# Patient Record
Sex: MALE | Race: WHITE | NOT HISPANIC OR LATINO | Employment: OTHER | URBAN - METROPOLITAN AREA
[De-identification: names, ages, dates, MRNs, and addresses within clinical notes are randomized per-mention and may not be internally consistent; named-entity substitution may affect disease eponyms.]

---

## 2017-01-26 ENCOUNTER — COMMUNICATION - HEALTHEAST (OUTPATIENT)
Dept: INTERNAL MEDICINE | Facility: CLINIC | Age: 77
End: 2017-01-26

## 2017-01-27 ENCOUNTER — AMBULATORY - HEALTHEAST (OUTPATIENT)
Dept: INTERNAL MEDICINE | Facility: CLINIC | Age: 77
End: 2017-01-27

## 2017-03-13 ENCOUNTER — OFFICE VISIT - HEALTHEAST (OUTPATIENT)
Dept: INTERNAL MEDICINE | Facility: CLINIC | Age: 77
End: 2017-03-13

## 2017-03-13 DIAGNOSIS — J18.9 PNEUMONIA: ICD-10-CM

## 2017-03-13 LAB
ATRIAL RATE - MUSE: 112 BPM
DIASTOLIC BLOOD PRESSURE - MUSE: NORMAL MMHG
INTERPRETATION ECG - MUSE: NORMAL
P AXIS - MUSE: 70 DEGREES
PR INTERVAL - MUSE: 162 MS
QRS DURATION - MUSE: 82 MS
QT - MUSE: 328 MS
QTC - MUSE: 447 MS
R AXIS - MUSE: 56 DEGREES
SYSTOLIC BLOOD PRESSURE - MUSE: NORMAL MMHG
T AXIS - MUSE: 49 DEGREES
VENTRICULAR RATE- MUSE: 112 BPM

## 2017-03-13 ASSESSMENT — MIFFLIN-ST. JEOR: SCORE: 1393.61

## 2017-03-28 ENCOUNTER — OFFICE VISIT - HEALTHEAST (OUTPATIENT)
Dept: INTERNAL MEDICINE | Facility: CLINIC | Age: 77
End: 2017-03-28

## 2017-03-28 DIAGNOSIS — E11.9 DIABETES MELLITUS, TYPE 2 (H): ICD-10-CM

## 2017-03-28 LAB
CHOLEST SERPL-MCNC: 154 MG/DL
FASTING STATUS PATIENT QL REPORTED: YES
HBA1C MFR BLD: 7.4 % (ref 3.5–6)
HDLC SERPL-MCNC: 50 MG/DL
LDLC SERPL CALC-MCNC: 75 MG/DL
TRIGL SERPL-MCNC: 147 MG/DL

## 2017-03-28 ASSESSMENT — MIFFLIN-ST. JEOR: SCORE: 1398.14

## 2017-03-29 ENCOUNTER — COMMUNICATION - HEALTHEAST (OUTPATIENT)
Dept: INTERNAL MEDICINE | Facility: CLINIC | Age: 77
End: 2017-03-29

## 2017-05-30 ENCOUNTER — COMMUNICATION - HEALTHEAST (OUTPATIENT)
Dept: SCHEDULING | Facility: CLINIC | Age: 77
End: 2017-05-30

## 2017-06-15 ENCOUNTER — COMMUNICATION - HEALTHEAST (OUTPATIENT)
Dept: INTERNAL MEDICINE | Facility: CLINIC | Age: 77
End: 2017-06-15

## 2017-06-29 ENCOUNTER — OFFICE VISIT - HEALTHEAST (OUTPATIENT)
Dept: INTERNAL MEDICINE | Facility: CLINIC | Age: 77
End: 2017-06-29

## 2017-06-29 DIAGNOSIS — E11.9 DIABETES MELLITUS, TYPE 2 (H): ICD-10-CM

## 2017-06-29 LAB
CHOLEST SERPL-MCNC: 165 MG/DL
FASTING STATUS PATIENT QL REPORTED: YES
HBA1C MFR BLD: 6.9 % (ref 3.5–6)
HDLC SERPL-MCNC: 49 MG/DL
LDLC SERPL CALC-MCNC: 93 MG/DL
TRIGL SERPL-MCNC: 115 MG/DL

## 2017-06-29 ASSESSMENT — MIFFLIN-ST. JEOR: SCORE: 1397.58

## 2017-07-06 ENCOUNTER — COMMUNICATION - HEALTHEAST (OUTPATIENT)
Dept: INTERNAL MEDICINE | Facility: CLINIC | Age: 77
End: 2017-07-06

## 2017-08-09 ENCOUNTER — AMBULATORY - HEALTHEAST (OUTPATIENT)
Dept: INTERNAL MEDICINE | Facility: CLINIC | Age: 77
End: 2017-08-09

## 2017-09-26 ENCOUNTER — COMMUNICATION - HEALTHEAST (OUTPATIENT)
Dept: SCHEDULING | Facility: CLINIC | Age: 77
End: 2017-09-26

## 2017-10-03 ENCOUNTER — COMMUNICATION - HEALTHEAST (OUTPATIENT)
Dept: INTERNAL MEDICINE | Facility: CLINIC | Age: 77
End: 2017-10-03

## 2017-10-03 ENCOUNTER — OFFICE VISIT - HEALTHEAST (OUTPATIENT)
Dept: INTERNAL MEDICINE | Facility: CLINIC | Age: 77
End: 2017-10-03

## 2017-10-03 DIAGNOSIS — E11.9 DIABETES MELLITUS, TYPE 2 (H): ICD-10-CM

## 2017-10-03 LAB
CHOLEST SERPL-MCNC: 151 MG/DL
FASTING STATUS PATIENT QL REPORTED: YES
HBA1C MFR BLD: 7 % (ref 3.5–6)
HDLC SERPL-MCNC: 49 MG/DL
LDLC SERPL CALC-MCNC: 78 MG/DL
TRIGL SERPL-MCNC: 121 MG/DL

## 2017-10-03 ASSESSMENT — MIFFLIN-ST. JEOR: SCORE: 1406.65

## 2017-10-30 ENCOUNTER — COMMUNICATION - HEALTHEAST (OUTPATIENT)
Dept: SCHEDULING | Facility: CLINIC | Age: 77
End: 2017-10-30

## 2017-10-30 ENCOUNTER — RECORDS - HEALTHEAST (OUTPATIENT)
Dept: ADMINISTRATIVE | Facility: OTHER | Age: 77
End: 2017-10-30

## 2017-12-13 ENCOUNTER — RECORDS - HEALTHEAST (OUTPATIENT)
Dept: ADMINISTRATIVE | Facility: OTHER | Age: 77
End: 2017-12-13

## 2017-12-28 ENCOUNTER — COMMUNICATION - HEALTHEAST (OUTPATIENT)
Dept: INTERNAL MEDICINE | Facility: CLINIC | Age: 77
End: 2017-12-28

## 2018-01-04 ENCOUNTER — OFFICE VISIT - HEALTHEAST (OUTPATIENT)
Dept: INTERNAL MEDICINE | Facility: CLINIC | Age: 78
End: 2018-01-04

## 2018-01-04 DIAGNOSIS — E11.9 DIABETES MELLITUS, TYPE 2 (H): ICD-10-CM

## 2018-01-04 DIAGNOSIS — R56.9 SEIZURE (H): ICD-10-CM

## 2018-01-04 LAB
CHOLEST SERPL-MCNC: 169 MG/DL
CREAT UR-MCNC: 155.4 MG/DL
FASTING STATUS PATIENT QL REPORTED: YES
FASTING STATUS PATIENT QL REPORTED: YES
GLUCOSE BLD-MCNC: 151 MG/DL (ref 70–125)
HBA1C MFR BLD: 7 % (ref 3.5–6)
HDLC SERPL-MCNC: 46 MG/DL
HIV 1+2 AB+HIV1 P24 AG SERPL QL IA: NEGATIVE
LDLC SERPL CALC-MCNC: 74 MG/DL
MICROALBUMIN UR-MCNC: 0.62 MG/DL (ref 0–1.99)
MICROALBUMIN/CREAT UR: 4 MG/G
TRIGL SERPL-MCNC: 246 MG/DL

## 2018-01-04 ASSESSMENT — MIFFLIN-ST. JEOR: SCORE: 1411.19

## 2018-01-05 ENCOUNTER — RECORDS - HEALTHEAST (OUTPATIENT)
Dept: ADMINISTRATIVE | Facility: OTHER | Age: 78
End: 2018-01-05

## 2018-01-05 ENCOUNTER — RECORDS - HEALTHEAST (OUTPATIENT)
Dept: LAB | Facility: HOSPITAL | Age: 78
End: 2018-01-05

## 2018-01-05 ENCOUNTER — COMMUNICATION - HEALTHEAST (OUTPATIENT)
Dept: INTERNAL MEDICINE | Facility: CLINIC | Age: 78
End: 2018-01-05

## 2018-01-05 LAB
TSH SERPL DL<=0.005 MIU/L-ACNC: 1.75 UIU/ML (ref 0.3–5)
VIT B12 SERPL-MCNC: 1197 PG/ML (ref 213–816)

## 2018-01-30 ENCOUNTER — RECORDS - HEALTHEAST (OUTPATIENT)
Dept: ADMINISTRATIVE | Facility: OTHER | Age: 78
End: 2018-01-30

## 2018-01-31 ENCOUNTER — COMMUNICATION - HEALTHEAST (OUTPATIENT)
Dept: INTERNAL MEDICINE | Facility: CLINIC | Age: 78
End: 2018-01-31

## 2018-02-22 ENCOUNTER — COMMUNICATION - HEALTHEAST (OUTPATIENT)
Dept: INTERNAL MEDICINE | Facility: CLINIC | Age: 78
End: 2018-02-22

## 2018-02-23 ENCOUNTER — COMMUNICATION - HEALTHEAST (OUTPATIENT)
Dept: INTERNAL MEDICINE | Facility: CLINIC | Age: 78
End: 2018-02-23

## 2018-02-23 ENCOUNTER — OFFICE VISIT - HEALTHEAST (OUTPATIENT)
Dept: INTERNAL MEDICINE | Facility: CLINIC | Age: 78
End: 2018-02-23

## 2018-02-23 DIAGNOSIS — Z00.00 ROUTINE GENERAL MEDICAL EXAMINATION AT A HEALTH CARE FACILITY: ICD-10-CM

## 2018-02-23 DIAGNOSIS — E11.9 TYPE 2 DIABETES MELLITUS (H): ICD-10-CM

## 2018-02-23 LAB
ALBUMIN SERPL-MCNC: 3.9 G/DL (ref 3.5–5)
ALBUMIN UR-MCNC: NEGATIVE MG/DL
ALP SERPL-CCNC: 71 U/L (ref 45–120)
ALT SERPL W P-5'-P-CCNC: 20 U/L (ref 0–45)
ANION GAP SERPL CALCULATED.3IONS-SCNC: 10 MMOL/L (ref 5–18)
APPEARANCE UR: CLEAR
AST SERPL W P-5'-P-CCNC: 21 U/L (ref 0–40)
BILIRUB SERPL-MCNC: 0.8 MG/DL (ref 0–1)
BILIRUB UR QL STRIP: NEGATIVE
BUN SERPL-MCNC: 18 MG/DL (ref 8–28)
CALCIUM SERPL-MCNC: 9.4 MG/DL (ref 8.5–10.5)
CHLORIDE BLD-SCNC: 103 MMOL/L (ref 98–107)
CHOLEST SERPL-MCNC: 170 MG/DL
CO2 SERPL-SCNC: 24 MMOL/L (ref 22–31)
COLOR UR AUTO: YELLOW
CREAT SERPL-MCNC: 1.35 MG/DL (ref 0.7–1.3)
CREAT UR-MCNC: 256.6 MG/DL
ERYTHROCYTE [DISTWIDTH] IN BLOOD BY AUTOMATED COUNT: 12.7 % (ref 11–14.5)
FASTING STATUS PATIENT QL REPORTED: NORMAL
GFR SERPL CREATININE-BSD FRML MDRD: 52 ML/MIN/1.73M2
GLUCOSE BLD-MCNC: 117 MG/DL (ref 70–125)
GLUCOSE UR STRIP-MCNC: NEGATIVE MG/DL
HBA1C MFR BLD: 7 % (ref 3.5–6)
HCT VFR BLD AUTO: 42.3 % (ref 40–54)
HDLC SERPL-MCNC: 55 MG/DL
HGB BLD-MCNC: 14.4 G/DL (ref 14–18)
HGB UR QL STRIP: NEGATIVE
KETONES UR STRIP-MCNC: NEGATIVE MG/DL
LDLC SERPL CALC-MCNC: 101 MG/DL
LEUKOCYTE ESTERASE UR QL STRIP: NEGATIVE
MCH RBC QN AUTO: 30.4 PG (ref 27–34)
MCHC RBC AUTO-ENTMCNC: 34 G/DL (ref 32–36)
MCV RBC AUTO: 90 FL (ref 80–100)
MICROALBUMIN UR-MCNC: 1.4 MG/DL (ref 0–1.99)
MICROALBUMIN/CREAT UR: 5.5 MG/G
NITRATE UR QL: NEGATIVE
PH UR STRIP: 5.5 [PH] (ref 5–8)
PLATELET # BLD AUTO: 133 THOU/UL (ref 140–440)
PMV BLD AUTO: 7.9 FL (ref 7–10)
POTASSIUM BLD-SCNC: 4.4 MMOL/L (ref 3.5–5)
PROT SERPL-MCNC: 7.5 G/DL (ref 6–8)
RBC # BLD AUTO: 4.72 MILL/UL (ref 4.4–6.2)
SODIUM SERPL-SCNC: 137 MMOL/L (ref 136–145)
SP GR UR STRIP: >=1.03 (ref 1–1.03)
TRIGL SERPL-MCNC: 72 MG/DL
TSH SERPL DL<=0.005 MIU/L-ACNC: 1.51 UIU/ML (ref 0.3–5)
UROBILINOGEN UR STRIP-ACNC: NORMAL
WBC: 6 THOU/UL (ref 4–11)

## 2018-02-23 ASSESSMENT — MIFFLIN-ST. JEOR: SCORE: 1406.65

## 2018-02-26 ENCOUNTER — COMMUNICATION - HEALTHEAST (OUTPATIENT)
Dept: INTERNAL MEDICINE | Facility: CLINIC | Age: 78
End: 2018-02-26

## 2018-02-28 ENCOUNTER — COMMUNICATION - HEALTHEAST (OUTPATIENT)
Dept: INTERNAL MEDICINE | Facility: CLINIC | Age: 78
End: 2018-02-28

## 2018-03-08 ENCOUNTER — COMMUNICATION - HEALTHEAST (OUTPATIENT)
Dept: INTERNAL MEDICINE | Facility: CLINIC | Age: 78
End: 2018-03-08

## 2018-03-08 ENCOUNTER — COMMUNICATION - HEALTHEAST (OUTPATIENT)
Dept: SCHEDULING | Facility: CLINIC | Age: 78
End: 2018-03-08

## 2018-03-13 ENCOUNTER — RECORDS - HEALTHEAST (OUTPATIENT)
Dept: ADMINISTRATIVE | Facility: OTHER | Age: 78
End: 2018-03-13

## 2018-03-15 ENCOUNTER — RECORDS - HEALTHEAST (OUTPATIENT)
Dept: ADMINISTRATIVE | Facility: OTHER | Age: 78
End: 2018-03-15

## 2018-03-27 ENCOUNTER — RECORDS - HEALTHEAST (OUTPATIENT)
Dept: ADMINISTRATIVE | Facility: OTHER | Age: 78
End: 2018-03-27

## 2018-04-02 ENCOUNTER — RECORDS - HEALTHEAST (OUTPATIENT)
Dept: ADMINISTRATIVE | Facility: OTHER | Age: 78
End: 2018-04-02

## 2018-05-25 ENCOUNTER — OFFICE VISIT - HEALTHEAST (OUTPATIENT)
Dept: INTERNAL MEDICINE | Facility: CLINIC | Age: 78
End: 2018-05-25

## 2018-05-25 DIAGNOSIS — E11.9 DIABETES MELLITUS, TYPE 2 (H): ICD-10-CM

## 2018-05-25 LAB
CHOLEST SERPL-MCNC: 156 MG/DL
FASTING STATUS PATIENT QL REPORTED: YES
FASTING STATUS PATIENT QL REPORTED: YES
GLUCOSE BLD-MCNC: 146 MG/DL (ref 70–125)
HBA1C MFR BLD: 7.4 % (ref 3.5–6)
HDLC SERPL-MCNC: 45 MG/DL
LDLC SERPL CALC-MCNC: 82 MG/DL
TRIGL SERPL-MCNC: 147 MG/DL

## 2018-05-25 ASSESSMENT — MIFFLIN-ST. JEOR: SCORE: 1402.11

## 2018-05-29 ENCOUNTER — COMMUNICATION - HEALTHEAST (OUTPATIENT)
Dept: INTERNAL MEDICINE | Facility: CLINIC | Age: 78
End: 2018-05-29

## 2018-06-04 ENCOUNTER — COMMUNICATION - HEALTHEAST (OUTPATIENT)
Dept: INTERNAL MEDICINE | Facility: CLINIC | Age: 78
End: 2018-06-04

## 2018-06-06 ENCOUNTER — COMMUNICATION - HEALTHEAST (OUTPATIENT)
Dept: SCHEDULING | Facility: CLINIC | Age: 78
End: 2018-06-06

## 2018-06-13 ENCOUNTER — RECORDS - HEALTHEAST (OUTPATIENT)
Dept: ADMINISTRATIVE | Facility: OTHER | Age: 78
End: 2018-06-13

## 2018-07-18 ENCOUNTER — COMMUNICATION - HEALTHEAST (OUTPATIENT)
Dept: INTERNAL MEDICINE | Facility: CLINIC | Age: 78
End: 2018-07-18

## 2018-07-26 ENCOUNTER — OFFICE VISIT - HEALTHEAST (OUTPATIENT)
Dept: INTERNAL MEDICINE | Facility: CLINIC | Age: 78
End: 2018-07-26

## 2018-07-26 DIAGNOSIS — E11.9 DIABETES MELLITUS, TYPE 2 (H): ICD-10-CM

## 2018-07-26 ASSESSMENT — MIFFLIN-ST. JEOR: SCORE: 1393.04

## 2018-08-09 ENCOUNTER — RECORDS - HEALTHEAST (OUTPATIENT)
Dept: ADMINISTRATIVE | Facility: OTHER | Age: 78
End: 2018-08-09

## 2018-08-30 ENCOUNTER — OFFICE VISIT - HEALTHEAST (OUTPATIENT)
Dept: INTERNAL MEDICINE | Facility: CLINIC | Age: 78
End: 2018-08-30

## 2018-08-30 DIAGNOSIS — E11.9 DIABETES MELLITUS, TYPE 2 (H): ICD-10-CM

## 2018-08-30 LAB
CHOLEST SERPL-MCNC: 156 MG/DL
FASTING STATUS PATIENT QL REPORTED: ABNORMAL
FASTING STATUS PATIENT QL REPORTED: ABNORMAL
GLUCOSE BLD-MCNC: 141 MG/DL (ref 70–125)
HBA1C MFR BLD: 7.1 % (ref 3.5–6)
HDLC SERPL-MCNC: 47 MG/DL
LDLC SERPL CALC-MCNC: 79 MG/DL
TRIGL SERPL-MCNC: 151 MG/DL

## 2018-08-30 ASSESSMENT — MIFFLIN-ST. JEOR: SCORE: 1383.97

## 2018-08-31 ENCOUNTER — COMMUNICATION - HEALTHEAST (OUTPATIENT)
Dept: INTERNAL MEDICINE | Facility: CLINIC | Age: 78
End: 2018-08-31

## 2018-09-02 ENCOUNTER — COMMUNICATION - HEALTHEAST (OUTPATIENT)
Dept: SCHEDULING | Facility: CLINIC | Age: 78
End: 2018-09-02

## 2018-09-10 ENCOUNTER — RECORDS - HEALTHEAST (OUTPATIENT)
Dept: ADMINISTRATIVE | Facility: OTHER | Age: 78
End: 2018-09-10

## 2018-09-24 ENCOUNTER — COMMUNICATION - HEALTHEAST (OUTPATIENT)
Dept: INTERNAL MEDICINE | Facility: CLINIC | Age: 78
End: 2018-09-24

## 2018-09-26 ENCOUNTER — RECORDS - HEALTHEAST (OUTPATIENT)
Dept: ADMINISTRATIVE | Facility: OTHER | Age: 78
End: 2018-09-26

## 2018-10-30 ENCOUNTER — OFFICE VISIT - HEALTHEAST (OUTPATIENT)
Dept: INTERNAL MEDICINE | Facility: CLINIC | Age: 78
End: 2018-10-30

## 2018-10-30 ENCOUNTER — COMMUNICATION - HEALTHEAST (OUTPATIENT)
Dept: INTERNAL MEDICINE | Facility: CLINIC | Age: 78
End: 2018-10-30

## 2018-10-30 DIAGNOSIS — Z72.52 HIGH RISK HOMOSEXUAL BEHAVIOR: ICD-10-CM

## 2018-10-30 DIAGNOSIS — Z23 NEED FOR IMMUNIZATION AGAINST INFLUENZA: ICD-10-CM

## 2018-10-30 LAB
HCV AB SERPL QL IA: NEGATIVE
HIV 1+2 AB+HIV1 P24 AG SERPL QL IA: NEGATIVE

## 2018-10-30 ASSESSMENT — MIFFLIN-ST. JEOR: SCORE: 1393.04

## 2018-11-23 ENCOUNTER — COMMUNICATION - HEALTHEAST (OUTPATIENT)
Dept: INTERNAL MEDICINE | Facility: CLINIC | Age: 78
End: 2018-11-23

## 2018-12-28 ENCOUNTER — COMMUNICATION - HEALTHEAST (OUTPATIENT)
Dept: INTERNAL MEDICINE | Facility: CLINIC | Age: 78
End: 2018-12-28

## 2018-12-28 DIAGNOSIS — E78.5 HLD (HYPERLIPIDEMIA): ICD-10-CM

## 2018-12-31 ENCOUNTER — OFFICE VISIT - HEALTHEAST (OUTPATIENT)
Dept: INTERNAL MEDICINE | Facility: CLINIC | Age: 78
End: 2018-12-31

## 2018-12-31 ENCOUNTER — COMMUNICATION - HEALTHEAST (OUTPATIENT)
Dept: INTERNAL MEDICINE | Facility: CLINIC | Age: 78
End: 2018-12-31

## 2018-12-31 DIAGNOSIS — Z01.818 PREOPERATIVE EXAMINATION: ICD-10-CM

## 2018-12-31 ASSESSMENT — MIFFLIN-ST. JEOR: SCORE: 1379.43

## 2019-01-02 ENCOUNTER — COMMUNICATION - HEALTHEAST (OUTPATIENT)
Dept: INTERNAL MEDICINE | Facility: CLINIC | Age: 79
End: 2019-01-02

## 2019-01-21 ENCOUNTER — COMMUNICATION - HEALTHEAST (OUTPATIENT)
Dept: SCHEDULING | Facility: CLINIC | Age: 79
End: 2019-01-21

## 2019-01-22 ENCOUNTER — ANCILLARY PROCEDURE (OUTPATIENT)
Dept: GENERAL RADIOLOGY | Facility: CLINIC | Age: 79
End: 2019-01-22
Payer: MEDICARE

## 2019-01-22 ENCOUNTER — OFFICE VISIT (OUTPATIENT)
Dept: URGENT CARE | Facility: URGENT CARE | Age: 79
End: 2019-01-22
Payer: MEDICARE

## 2019-01-22 VITALS
HEART RATE: 102 BPM | RESPIRATION RATE: 20 BRPM | DIASTOLIC BLOOD PRESSURE: 82 MMHG | TEMPERATURE: 97.5 F | OXYGEN SATURATION: 95 % | SYSTOLIC BLOOD PRESSURE: 150 MMHG | WEIGHT: 160 LBS

## 2019-01-22 DIAGNOSIS — R05.9 COUGH: ICD-10-CM

## 2019-01-22 DIAGNOSIS — J18.9 PNEUMONIA OF LEFT UPPER LOBE DUE TO INFECTIOUS ORGANISM: ICD-10-CM

## 2019-01-22 DIAGNOSIS — R05.9 COUGH: Primary | ICD-10-CM

## 2019-01-22 PROCEDURE — 71046 X-RAY EXAM CHEST 2 VIEWS: CPT

## 2019-01-22 PROCEDURE — 99203 OFFICE O/P NEW LOW 30 MIN: CPT | Performed by: FAMILY MEDICINE

## 2019-01-22 RX ORDER — PAROXETINE 20 MG/1
40 TABLET, FILM COATED ORAL EVERY MORNING
COMMUNITY

## 2019-01-22 RX ORDER — PAROXETINE 10 MG/1
10 TABLET, FILM COATED ORAL AT BEDTIME
COMMUNITY
End: 2023-07-03

## 2019-01-22 RX ORDER — AZITHROMYCIN 250 MG/1
TABLET, FILM COATED ORAL
Qty: 6 TABLET | Refills: 0 | Status: SHIPPED | OUTPATIENT
Start: 2019-01-22 | End: 2019-04-15

## 2019-01-22 RX ORDER — SIMVASTATIN 20 MG
20 TABLET ORAL AT BEDTIME
COMMUNITY
End: 2022-10-10

## 2019-01-22 RX ORDER — LORAZEPAM 0.5 MG/1
TABLET ORAL
COMMUNITY

## 2019-01-22 NOTE — PROGRESS NOTES
SUBJECTIVE:  Wilfrid Ortez is a 78 year old male who presents to the clinic today with a chief complaint of cough  for 4 week(s).  His cough is described as persistent and sometimes productive.    The patient's symptoms are moderate and worsening again after about a week of improvement.  Associated symptoms include fatigue, shortness of breath. The patient's symptoms are exacerbated by no particular triggers  Patient has been using Mucinex to improve symptoms.    ROS:  No known fevers.  No chest pain.  No ear pain or sore throat at present.  No GI symptoms.  No leg swelling.  No joint swelling.  No rashes.  No focal weakness, just general fatigue.  Shortness of breath is intermittent.  No immune issues.    PMH:  Panic disorder  Type II diabetes      Current Outpatient Medications   Medication Sig Dispense Refill     aspirin (ASA) 325 MG EC tablet Take 325 mg by mouth daily       azithromycin (ZITHROMAX) 250 MG tablet Take 2 tablets (500 mg) by mouth daily for 1 day, THEN 1 tablet (250 mg) daily for 4 days. 6 tablet 0     LORazepam (ATIVAN) 0.5 MG tablet Take 0.5 mg by mouth every 6 hours as needed for anxiety Patient takes 0.5 mg tab at nigjht       omeprazole (PRILOSEC) 20 MG DR capsule Take 20 mg by mouth daily       PARoxetine (PAXIL) 40 MG tablet Take 40 mg by mouth every morning Patient takes 2 20mg tab in am       simvastatin (ZOCOR) 20 MG tablet Take 20 mg by mouth At Bedtime       PARoxetine (PAXIL) 10 MG tablet Take 10 mg by mouth every morning Patient is suppose to take 10 tab at night but he cannot break it so he is not taking it         Social History     Tobacco Use     Smoking status: Never Smoker     Smokeless tobacco: Never Used   Substance Use Topics     Alcohol use: Not on file       OBJECTIVE:  /82 (Cuff Size: Adult Regular)   Pulse 102   Temp 97.5  F (36.4  C) (Oral)   Resp 20   Wt 72.6 kg (160 lb)   SpO2 95%   GENERAL APPEARANCE: healthy, alert and no distress  EYES:  anicteric sclerae, conjunctivae clear  HENT: ear canals and TM's normal.  Nose and mouth without ulcers, erythema or lesions  NECK: supple, nontender, no lymphadenopathy  RESP: few crackles RML, no wheezing, good air entry bilaterally  CV: regular rate and rhythm, normal S1 S2, no murmur noted  NEURO: fine resting tremor noted  SKIN: no suspicious lesions or rashes    CXR:  My reading of this film is that there is increased fullness around the L hilar area as well as a linear infiltrate in the lowerl portion of the KIMMY.  No cardiomegaly.  No effusions.  Radiology review pending.    ASSESSMENT:  KIMMY pneumonia  Type II diabetes, well-controlled    PLAN:  Patient Instructions   Take azithromycin for five days (2 tablets on day one, then 1 tablet every day for the next four).    Rest as your energy level dictates.    Follow up early next week with your regular doctor if not feeling back to usual self, or sooner if worsening.

## 2019-01-22 NOTE — PATIENT INSTRUCTIONS
Take azithromycin for five days (2 tablets on day one, then 1 tablet every day for the next four).    Rest as your energy level dictates.    Follow up early next week with your regular doctor if not feeling back to usual self, or sooner if worsening.

## 2019-02-05 ENCOUNTER — OFFICE VISIT (OUTPATIENT)
Dept: URGENT CARE | Facility: URGENT CARE | Age: 79
End: 2019-02-05
Payer: MEDICARE

## 2019-02-05 ENCOUNTER — COMMUNICATION - HEALTHEAST (OUTPATIENT)
Dept: SCHEDULING | Facility: CLINIC | Age: 79
End: 2019-02-05

## 2019-02-05 VITALS
SYSTOLIC BLOOD PRESSURE: 134 MMHG | WEIGHT: 162 LBS | HEART RATE: 81 BPM | TEMPERATURE: 98.3 F | OXYGEN SATURATION: 98 % | DIASTOLIC BLOOD PRESSURE: 78 MMHG | RESPIRATION RATE: 16 BRPM

## 2019-02-05 DIAGNOSIS — J98.01 ACUTE BRONCHOSPASM: Primary | ICD-10-CM

## 2019-02-05 PROCEDURE — 99214 OFFICE O/P EST MOD 30 MIN: CPT | Performed by: FAMILY MEDICINE

## 2019-02-05 RX ORDER — ALBUTEROL SULFATE 90 UG/1
2 AEROSOL, METERED RESPIRATORY (INHALATION) EVERY 4 HOURS PRN
Qty: 1 INHALER | Refills: 0 | Status: SHIPPED | OUTPATIENT
Start: 2019-02-05 | End: 2021-11-17

## 2019-02-06 NOTE — PROGRESS NOTES
SUBJECTIVE:  Wilfrid Ortez is a 78 year old male who presents to the clinic today with a chief complaint of wheezing intermittently since yesterday.   The patient's symptoms are mild and have resolved after a short time for each episode.  Associated symptoms include none. The patient's symptoms are exacerbated by no particular triggers  Patient has been using nothing  to improve symptoms.    No past medical history on file.   Recently treated with azithromycin for pneumonia.  Felt much better after antibiotics.        Social History     Tobacco Use     Smoking status: Never Smoker     Smokeless tobacco: Never Used   Substance Use Topics     Alcohol use: Not on file     Brother passed away last week.    ROS  No chest pain or palpitations.  No fever.  No shortness of breath between episodes of wheezing.    OBJECTIVE:  /78 (Cuff Size: Adult Regular)   Pulse 81   Temp 98.3  F (36.8  C) (Oral)   Resp 16   Wt 73.5 kg (162 lb)   SpO2 98%   GENERAL APPEARANCE: healthy, alert and no distress  EYES: anicteric sclerae, conjunctivae clear  HENT: ear canals and TM's normal.  Nose and mouth without ulcers, erythema or lesions  NECK: supple, nontender, no lymphadenopathy  RESP: lungs clear to auscultation - no rales, rhonchi or wheezes  CV: regular rate and rhythm, normal S1 S2, no murmur noted  SKIN: no suspicious lesions or rashes    ASSESSMENT:  (J98.01) Acute bronchospasm  (primary encounter diagnosis)  No bronchospasm at the present time.  No signs of CHF on exam tonight.  Lung exam significantly improved from my last visit with this patient, so I don't think there is residual pneumonia.    PLAN:  Albuterol q4h PRN for wheezing  Follow up with primary MD on Friday if still having episodes of wheezing.  Otherwise follow up for pre-op as scheduled next week.  Symptomatic measures encouraged, humidified air, plenty of fluids.

## 2019-02-06 NOTE — PATIENT INSTRUCTIONS
Albuterol inhaler:  Use this medicine to help open your airways when you notice that you are wheezing.    If not better by Friday, please call your primary doctor to check in.

## 2019-02-12 ENCOUNTER — OFFICE VISIT - HEALTHEAST (OUTPATIENT)
Dept: INTERNAL MEDICINE | Facility: CLINIC | Age: 79
End: 2019-02-12

## 2019-02-12 DIAGNOSIS — Z01.818 PREOPERATIVE EXAMINATION: ICD-10-CM

## 2019-02-12 LAB
HGB BLD-MCNC: 13.7 G/DL (ref 14–18)
POTASSIUM BLD-SCNC: 4 MMOL/L (ref 3.5–5)

## 2019-02-12 ASSESSMENT — MIFFLIN-ST. JEOR: SCORE: 1388.51

## 2019-02-13 ENCOUNTER — COMMUNICATION - HEALTHEAST (OUTPATIENT)
Dept: INTERNAL MEDICINE | Facility: CLINIC | Age: 79
End: 2019-02-13

## 2019-02-14 ENCOUNTER — COMMUNICATION - HEALTHEAST (OUTPATIENT)
Dept: INTERNAL MEDICINE | Facility: CLINIC | Age: 79
End: 2019-02-14

## 2019-02-17 ENCOUNTER — COMMUNICATION - HEALTHEAST (OUTPATIENT)
Dept: INTERNAL MEDICINE | Facility: CLINIC | Age: 79
End: 2019-02-17

## 2019-03-26 ENCOUNTER — RECORDS - HEALTHEAST (OUTPATIENT)
Dept: ADMINISTRATIVE | Facility: OTHER | Age: 79
End: 2019-03-26

## 2019-04-01 ENCOUNTER — COMMUNICATION - HEALTHEAST (OUTPATIENT)
Dept: INTERNAL MEDICINE | Facility: CLINIC | Age: 79
End: 2019-04-01

## 2019-04-01 ENCOUNTER — OFFICE VISIT - HEALTHEAST (OUTPATIENT)
Dept: INTERNAL MEDICINE | Facility: CLINIC | Age: 79
End: 2019-04-01

## 2019-04-01 DIAGNOSIS — Z12.5 SCREENING FOR PROSTATE CANCER: ICD-10-CM

## 2019-04-01 DIAGNOSIS — E11.8 TYPE 2 DIABETES MELLITUS WITH COMPLICATION, WITHOUT LONG-TERM CURRENT USE OF INSULIN (H): ICD-10-CM

## 2019-04-01 DIAGNOSIS — E78.5 HLD (HYPERLIPIDEMIA): ICD-10-CM

## 2019-04-01 LAB
CHOLEST SERPL-MCNC: 150 MG/DL
CREAT UR-MCNC: 235.3 MG/DL
FASTING STATUS PATIENT QL REPORTED: YES
FASTING STATUS PATIENT QL REPORTED: YES
GLUCOSE BLD-MCNC: 153 MG/DL (ref 70–125)
HBA1C MFR BLD: 6.6 % (ref 3.5–6)
HDLC SERPL-MCNC: 40 MG/DL
HGB BLD-MCNC: 14.7 G/DL (ref 14–18)
LDLC SERPL CALC-MCNC: 44 MG/DL
MICROALBUMIN UR-MCNC: 1.48 MG/DL (ref 0–1.99)
MICROALBUMIN/CREAT UR: 6.3 MG/G
PSA SERPL-MCNC: 3.3 NG/ML (ref 0–6.5)
TRIGL SERPL-MCNC: 330 MG/DL

## 2019-04-01 ASSESSMENT — MIFFLIN-ST. JEOR: SCORE: 1374.9

## 2019-04-15 ENCOUNTER — OFFICE VISIT (OUTPATIENT)
Dept: URGENT CARE | Facility: URGENT CARE | Age: 79
End: 2019-04-15
Payer: MEDICARE

## 2019-04-15 ENCOUNTER — COMMUNICATION - HEALTHEAST (OUTPATIENT)
Dept: SCHEDULING | Facility: CLINIC | Age: 79
End: 2019-04-15

## 2019-04-15 VITALS
RESPIRATION RATE: 14 BRPM | BODY MASS INDEX: 26.03 KG/M2 | WEIGHT: 162 LBS | DIASTOLIC BLOOD PRESSURE: 76 MMHG | HEART RATE: 97 BPM | HEIGHT: 66 IN | SYSTOLIC BLOOD PRESSURE: 129 MMHG | TEMPERATURE: 98.3 F

## 2019-04-15 DIAGNOSIS — H81.11 BENIGN PAROXYSMAL POSITIONAL VERTIGO OF RIGHT EAR: Primary | ICD-10-CM

## 2019-04-15 PROCEDURE — 99214 OFFICE O/P EST MOD 30 MIN: CPT | Performed by: PHYSICIAN ASSISTANT

## 2019-04-15 PROCEDURE — 93000 ELECTROCARDIOGRAM COMPLETE: CPT | Performed by: PHYSICIAN ASSISTANT

## 2019-04-15 RX ORDER — MECLIZINE HYDROCHLORIDE 25 MG/1
25 TABLET ORAL 3 TIMES DAILY PRN
Qty: 30 TABLET | Refills: 0 | Status: SHIPPED | OUTPATIENT
Start: 2019-04-15 | End: 2021-11-17

## 2019-04-15 ASSESSMENT — MIFFLIN-ST. JEOR: SCORE: 1407.58

## 2019-04-15 NOTE — PATIENT INSTRUCTIONS
Patient Education     Benign Paroxysmal Positional Vertigo     Your health care provider may move your head in certain ways to treat your BPPV.     Benign paroxysmal positional vertigo (BPPV) is a problem with the inner ear. The inner ear contains the vestibular system. This system is what helps you keep your balance. BPPV causes a feeling of spinning. It is a common problem of the vestibular system.  Understanding the vestibular system  The vestibular system of the ear is made up of very tiny parts. They include the utricle, saccule, and semicircular canals. The utricle is a tiny organ that contains calcium crystals. In some people, the crystals can move into the semicircular canals. When this happens, the system no longer works as it should. This causes BPPV. Benign means it is not life threatening. Paroxysmal means it happens suddenly. Positional means that it happens when you move your head. Vertigo is a feeling of spinning.  What causes BPPV?  Causes include injury to your head or neck. Other problems with the vestibular system may cause BPPV. In many people, the cause of BPPV is not known.  Symptoms of BPPV  You many have repeated feelings of spinning (vertigo). The vertigo usually lasts less than 1 minute. Some movements, such as rolling over in bed, can bring on vertigo.  Diagnosing BPPV  Your primary healthcare provider may diagnose and treat your BPPV. Or you may see an ear, nose, and throat doctor (otolaryngologist). In some cases, you may see a nervous system doctor (neurologist).  The healthcare provider will ask about your symptoms and your medical history. He or she will examine you. You may have hearing and balance tests. As part of the exam, your healthcare provider may have you move your head and body in certain ways. If you have BPPV, the movements can bring on vertigo. Your provider will also look for abnormal movements of your eyes. You may have other tests to check your vestibular or nervous  systems.  Treatment for BPPV  Your healthcare provider may try to move the calcium crystals. This is done by having you move your head and neck in certain ways. This treatment is safe and often works well. You may also be told to do these movements at home. You may still have vertigo for a few weeks. Your healthcare provider will recheck your symptoms, usually in about a month. Special physical therapy may also be part of treatment. In rare cases, surgery may be needed for BPPV that does not go away.     When to call the healthcare provider  Call your healthcare provider right away if you have any of these:    Symptoms that do not go away with treatment    Symptoms that get worse    New symptoms   Date Last Reviewed: 5/1/2017 2000-2018 [employer]. [address] [place] [address], [place] [address]. All rights reserved. This information is not intended as a substitute for professional medical care. Always follow your healthcare professional's instructions.

## 2019-04-16 NOTE — PROGRESS NOTES
CHIEF COMPLAINT:   Chief Complaint   Patient presents with     Dizziness     one week.pt states does have panic attacks. got up late morning and felt dizzy. pt type 2 diabetic.        HPI: Wilfrid Ortez is a 78 year old male who presents to clinic today for evaluation of dizziness. Patient reports that for the past one week he has been experiencing intermittent episodes of dizziness. Dizziness sometimes feels like the room is spinning around him and other times like he is light headed. Has noted intermittent nausea. He has been under more stress in the last couple of weeks. He has taken ativan, which helps his symptoms.  Denies fever/chills, HA, CP, pleuritic chest pain, cough, congestion, abd pain, N/V/D, rash, or any other symptoms.      History reviewed. No pertinent past medical history.  History reviewed. No pertinent surgical history.  Social History     Tobacco Use     Smoking status: Never Smoker     Smokeless tobacco: Never Used   Substance Use Topics     Alcohol use: Not on file     Current Outpatient Medications   Medication     albuterol (PROAIR HFA/PROVENTIL HFA/VENTOLIN HFA) 108 (90 Base) MCG/ACT inhaler     aspirin (ASA) 325 MG EC tablet     LORazepam (ATIVAN) 0.5 MG tablet     meclizine (ANTIVERT) 25 MG tablet     omeprazole (PRILOSEC) 20 MG DR capsule     PARoxetine (PAXIL) 10 MG tablet     PARoxetine (PAXIL) 40 MG tablet     simvastatin (ZOCOR) 20 MG tablet     No current facility-administered medications for this visit.      Allergies   Allergen Reactions     Ibuprofen      nausea       10 point ROS of systems including Constitutional, Eyes, Respiratory, Cardiovascular, Gastroenterology, Genitourinary, Integumentary, Muscularskeletal, Psychiatric were all negative except for pertinent positives noted in my HPI.        Exam:  /76 (BP Location: Right arm, Patient Position: Sitting, Cuff Size: Adult Regular)   Pulse 97   Temp 98.3  F (36.8  C) (Oral)   Resp 14   Ht 1.676 m (5'  "6\")   Wt 73.5 kg (162 lb)   BMI 26.15 kg/m    Constitutional: healthy, alert and no distress  Head: Normocephalic, atraumatic. Patient gets dizziness when moving his head to the right.   Eyes: conjunctiva clear, no drainage. Patient gets dizzy when moving his eyes to the right  ENT: TMs clear and shiny erica, nasal mucosa pink and moist, throat without tonsillar hypertrophy or erythema  Neck: neck is supple, no cervical lymphadenopathy or nuchal rigidity  Cardiovascular: RRR  Respiratory: CTA bilaterally, no rhonchi or rales  Gastrointestinal: soft and nontender  Skin: several patches of seborrheic dermatitis noted   Neurologic: CN 2-12,  Speech clear, gait normal. Moves all extremities. NO facial weakness. Coordination intact. Head and right arm tremor noted.      EKG- NSR    ASSESSMENT/PLAN:  1. Benign paroxysmal positional vertigo of right ear  DDx: CVA, MI/ACS, anxiety, Atrial fibrillation or other arrhythmia  Patient with new onset dizziness. Patient notes that the dizziness is worse in the AM, when he rolls over in bed.   Dizziness reproduced with moving his head to the right  No weakness, facial weakness, change in gait or neurological deficit  EKG is NSR  Meclizine Rx for BPPV, encouraged getting out of bed and turning head slowly. May need inner ear / vestibular PT if symptoms are not improving  RED FLAG symptoms discussed   - EKG 12-lead complete w/read - Clinics  - meclizine (ANTIVERT) 25 MG tablet; Take 1 tablet (25 mg) by mouth 3 times daily as needed for dizziness  Dispense: 30 tablet; Refill: 0    I have discussed the patient's diagnosis and my plan of treatment with the patient. We went over any labs or imaging. Patient is aware to come back in with worsening symptoms or if no relief despite treatment plan.  Patient verbalizes understanding. All questions were addressed and answered.   Kristine Gordon PA-C    "

## 2019-05-12 ENCOUNTER — RECORDS - HEALTHEAST (OUTPATIENT)
Dept: ADMINISTRATIVE | Facility: OTHER | Age: 79
End: 2019-05-12

## 2019-05-18 ENCOUNTER — COMMUNICATION - HEALTHEAST (OUTPATIENT)
Dept: INTERNAL MEDICINE | Facility: CLINIC | Age: 79
End: 2019-05-18

## 2019-05-18 DIAGNOSIS — Z00.00 ROUTINE GENERAL MEDICAL EXAMINATION AT A HEALTH CARE FACILITY: ICD-10-CM

## 2019-07-02 ENCOUNTER — OFFICE VISIT - HEALTHEAST (OUTPATIENT)
Dept: INTERNAL MEDICINE | Facility: CLINIC | Age: 79
End: 2019-07-02

## 2019-07-02 DIAGNOSIS — R20.2 PARESTHESIAS: ICD-10-CM

## 2019-07-02 DIAGNOSIS — E11.8 TYPE 2 DIABETES MELLITUS WITH COMPLICATION, WITHOUT LONG-TERM CURRENT USE OF INSULIN (H): ICD-10-CM

## 2019-07-02 LAB
CHOLEST SERPL-MCNC: 147 MG/DL
CREAT UR-MCNC: 215.4 MG/DL
FASTING STATUS PATIENT QL REPORTED: YES
FASTING STATUS PATIENT QL REPORTED: YES
GLUCOSE BLD-MCNC: 147 MG/DL (ref 70–125)
HBA1C MFR BLD: 6.9 % (ref 3.5–6)
HDLC SERPL-MCNC: 48 MG/DL
LDLC SERPL CALC-MCNC: 69 MG/DL
MICROALBUMIN UR-MCNC: 1.17 MG/DL (ref 0–1.99)
MICROALBUMIN/CREAT UR: 5.4 MG/G
TRIGL SERPL-MCNC: 149 MG/DL

## 2019-07-02 ASSESSMENT — MIFFLIN-ST. JEOR: SCORE: 1375.08

## 2019-07-03 ENCOUNTER — COMMUNICATION - HEALTHEAST (OUTPATIENT)
Dept: INTERNAL MEDICINE | Facility: CLINIC | Age: 79
End: 2019-07-03

## 2019-08-16 ENCOUNTER — COMMUNICATION - HEALTHEAST (OUTPATIENT)
Dept: INTERNAL MEDICINE | Facility: CLINIC | Age: 79
End: 2019-08-16

## 2019-08-16 DIAGNOSIS — Z00.00 ROUTINE GENERAL MEDICAL EXAMINATION AT A HEALTH CARE FACILITY: ICD-10-CM

## 2019-08-20 ENCOUNTER — RECORDS - HEALTHEAST (OUTPATIENT)
Dept: ADMINISTRATIVE | Facility: OTHER | Age: 79
End: 2019-08-20

## 2019-09-11 ENCOUNTER — RECORDS - HEALTHEAST (OUTPATIENT)
Dept: ADMINISTRATIVE | Facility: OTHER | Age: 79
End: 2019-09-11

## 2019-09-12 ENCOUNTER — RECORDS - HEALTHEAST (OUTPATIENT)
Dept: ADMINISTRATIVE | Facility: OTHER | Age: 79
End: 2019-09-12

## 2019-10-18 ENCOUNTER — HOSPITAL ENCOUNTER (OUTPATIENT)
Dept: LAB | Age: 79
Setting detail: SPECIMEN
Discharge: HOME | End: 2019-10-18

## 2019-10-18 ENCOUNTER — OFFICE VISIT - HEALTHEAST (OUTPATIENT)
Dept: INTERNAL MEDICINE | Facility: CLINIC | Age: 79
End: 2019-10-18

## 2019-10-18 DIAGNOSIS — E11.69 TYPE 2 DIABETES MELLITUS WITH OTHER SPECIFIED COMPLICATION, WITHOUT LONG-TERM CURRENT USE OF INSULIN (H): ICD-10-CM

## 2019-10-18 DIAGNOSIS — R56.9 SEIZURE (H): ICD-10-CM

## 2019-10-18 LAB
CHOLEST SERPL-MCNC: 157 MG/DL
CREAT UR-MCNC: 152.6 MG/DL
FASTING STATUS PATIENT QL REPORTED: YES
FASTING STATUS PATIENT QL REPORTED: YES
GLUCOSE BLD-MCNC: 132 MG/DL (ref 70–125)
HBA1C MFR BLD: 6.9 % (ref 3.5–6)
HDLC SERPL-MCNC: 56 MG/DL
LDLC SERPL CALC-MCNC: 82 MG/DL
MICROALBUMIN UR-MCNC: 0.86 MG/DL (ref 0–1.99)
MICROALBUMIN/CREAT UR: 5.6 MG/G
TRIGL SERPL-MCNC: 97 MG/DL

## 2019-10-18 ASSESSMENT — MIFFLIN-ST. JEOR: SCORE: 1374.9

## 2019-10-21 ENCOUNTER — COMMUNICATION - HEALTHEAST (OUTPATIENT)
Dept: INTERNAL MEDICINE | Facility: CLINIC | Age: 79
End: 2019-10-21

## 2019-11-07 ENCOUNTER — RECORDS - HEALTHEAST (OUTPATIENT)
Dept: ADMINISTRATIVE | Facility: OTHER | Age: 79
End: 2019-11-07

## 2019-11-07 LAB — RETINOPATHY: NEGATIVE

## 2019-11-11 ENCOUNTER — COMMUNICATION - HEALTHEAST (OUTPATIENT)
Dept: SCHEDULING | Facility: CLINIC | Age: 79
End: 2019-11-11

## 2019-11-12 ENCOUNTER — RECORDS - HEALTHEAST (OUTPATIENT)
Dept: SCHEDULING | Facility: CLINIC | Age: 79
End: 2019-11-12

## 2019-11-18 ENCOUNTER — RECORDS - HEALTHEAST (OUTPATIENT)
Dept: HEALTH INFORMATION MANAGEMENT | Facility: CLINIC | Age: 79
End: 2019-11-18

## 2019-11-19 ENCOUNTER — HOSPITAL ENCOUNTER (OUTPATIENT)
Dept: PHYSICAL MEDICINE AND REHAB | Facility: CLINIC | Age: 79
Discharge: HOME | End: 2019-11-19
Attending: STUDENT IN AN ORGANIZED HEALTH CARE EDUCATION/TRAINING PROGRAM

## 2019-11-19 DIAGNOSIS — M48.02 FORAMINAL STENOSIS OF CERVICAL REGION: ICD-10-CM

## 2019-11-19 DIAGNOSIS — M54.12 CERVICAL RADICULITIS: ICD-10-CM

## 2019-11-19 DIAGNOSIS — G56.02 CARPAL TUNNEL SYNDROME OF LEFT WRIST: ICD-10-CM

## 2019-11-19 ASSESSMENT — MIFFLIN-ST. JEOR: SCORE: 1393.49

## 2019-11-26 ENCOUNTER — OFFICE VISIT - HEALTHEAST (OUTPATIENT)
Dept: INTERNAL MEDICINE | Facility: CLINIC | Age: 79
End: 2019-11-26

## 2019-11-26 DIAGNOSIS — M54.12 CERVICAL RADICULOPATHY: ICD-10-CM

## 2019-11-26 DIAGNOSIS — R05.9 COUGH: ICD-10-CM

## 2019-11-26 ASSESSMENT — MIFFLIN-ST. JEOR: SCORE: 1393.04

## 2019-12-05 ENCOUNTER — OFFICE VISIT - HEALTHEAST (OUTPATIENT)
Dept: PHYSICAL THERAPY | Facility: REHABILITATION | Age: 79
End: 2019-12-05

## 2019-12-05 DIAGNOSIS — M54.12 CERVICAL RADICULITIS: ICD-10-CM

## 2019-12-05 DIAGNOSIS — M25.512 PAIN IN JOINT OF LEFT SHOULDER: ICD-10-CM

## 2019-12-05 DIAGNOSIS — R20.2 PARESTHESIAS IN LEFT HAND: ICD-10-CM

## 2019-12-05 DIAGNOSIS — M53.82 DECREASED ROM OF INTERVERTEBRAL DISCS OF CERVICAL SPINE: ICD-10-CM

## 2019-12-05 DIAGNOSIS — M25.522 ARTHRALGIA OF LEFT UPPER ARM: ICD-10-CM

## 2019-12-10 ENCOUNTER — COMMUNICATION - HEALTHEAST (OUTPATIENT)
Dept: PHYSICAL THERAPY | Facility: REHABILITATION | Age: 79
End: 2019-12-10

## 2019-12-13 ENCOUNTER — COMMUNICATION - HEALTHEAST (OUTPATIENT)
Dept: PHYSICAL THERAPY | Facility: REHABILITATION | Age: 79
End: 2019-12-13

## 2019-12-19 ENCOUNTER — RECORDS - HEALTHEAST (OUTPATIENT)
Dept: ADMINISTRATIVE | Facility: OTHER | Age: 79
End: 2019-12-19

## 2020-01-20 ENCOUNTER — OFFICE VISIT - HEALTHEAST (OUTPATIENT)
Dept: INTERNAL MEDICINE | Facility: CLINIC | Age: 80
End: 2020-01-20

## 2020-01-20 ENCOUNTER — COMMUNICATION - HEALTHEAST (OUTPATIENT)
Dept: INTERNAL MEDICINE | Facility: CLINIC | Age: 80
End: 2020-01-20

## 2020-01-20 ENCOUNTER — COMMUNICATION - HEALTHEAST (OUTPATIENT)
Dept: SCHEDULING | Facility: CLINIC | Age: 80
End: 2020-01-20

## 2020-01-20 DIAGNOSIS — E11.69 TYPE 2 DIABETES MELLITUS WITH OTHER SPECIFIED COMPLICATION, WITHOUT LONG-TERM CURRENT USE OF INSULIN (H): ICD-10-CM

## 2020-01-20 DIAGNOSIS — M54.12 CERVICAL RADICULOPATHY: ICD-10-CM

## 2020-01-20 LAB
CHOLEST SERPL-MCNC: 166 MG/DL
CREAT UR-MCNC: 114.1 MG/DL
FASTING STATUS PATIENT QL REPORTED: YES
FASTING STATUS PATIENT QL REPORTED: YES
GLUCOSE BLD-MCNC: 154 MG/DL (ref 70–125)
HBA1C MFR BLD: 6.7 % (ref 3.5–6)
HDLC SERPL-MCNC: 52 MG/DL
LDLC SERPL CALC-MCNC: 91 MG/DL
MICROALBUMIN UR-MCNC: 1.04 MG/DL (ref 0–1.99)
MICROALBUMIN/CREAT UR: 9.1 MG/G
TRIGL SERPL-MCNC: 115 MG/DL

## 2020-01-20 ASSESSMENT — MIFFLIN-ST. JEOR: SCORE: 1356.29

## 2020-01-28 ENCOUNTER — COMMUNICATION - HEALTHEAST (OUTPATIENT)
Dept: INTERNAL MEDICINE | Facility: CLINIC | Age: 80
End: 2020-01-28

## 2020-02-17 ENCOUNTER — COMMUNICATION - HEALTHEAST (OUTPATIENT)
Dept: INTERNAL MEDICINE | Facility: CLINIC | Age: 80
End: 2020-02-17

## 2020-02-17 DIAGNOSIS — Z00.00 ROUTINE GENERAL MEDICAL EXAMINATION AT A HEALTH CARE FACILITY: ICD-10-CM

## 2020-02-18 ENCOUNTER — COMMUNICATION - HEALTHEAST (OUTPATIENT)
Dept: SCHEDULING | Facility: CLINIC | Age: 80
End: 2020-02-18

## 2020-04-06 ENCOUNTER — COMMUNICATION - HEALTHEAST (OUTPATIENT)
Dept: INTERNAL MEDICINE | Facility: CLINIC | Age: 80
End: 2020-04-06

## 2020-04-06 DIAGNOSIS — E11.8 TYPE 2 DIABETES MELLITUS WITH COMPLICATION, WITHOUT LONG-TERM CURRENT USE OF INSULIN (H): ICD-10-CM

## 2020-04-06 DIAGNOSIS — E78.5 HLD (HYPERLIPIDEMIA): ICD-10-CM

## 2020-04-10 ENCOUNTER — COMMUNICATION - HEALTHEAST (OUTPATIENT)
Dept: INTERNAL MEDICINE | Facility: CLINIC | Age: 80
End: 2020-04-10

## 2020-04-10 DIAGNOSIS — E78.5 HLD (HYPERLIPIDEMIA): ICD-10-CM

## 2020-04-10 DIAGNOSIS — E11.8 TYPE 2 DIABETES MELLITUS WITH COMPLICATION, WITHOUT LONG-TERM CURRENT USE OF INSULIN (H): ICD-10-CM

## 2020-04-22 ENCOUNTER — COMMUNICATION - HEALTHEAST (OUTPATIENT)
Dept: INTERNAL MEDICINE | Facility: CLINIC | Age: 80
End: 2020-04-22

## 2020-04-22 DIAGNOSIS — M54.12 CERVICAL RADICULOPATHY: ICD-10-CM

## 2020-04-27 ENCOUNTER — COMMUNICATION - HEALTHEAST (OUTPATIENT)
Dept: INTERNAL MEDICINE | Facility: CLINIC | Age: 80
End: 2020-04-27

## 2020-06-09 ENCOUNTER — RECORDS - HEALTHEAST (OUTPATIENT)
Dept: ADMINISTRATIVE | Facility: OTHER | Age: 80
End: 2020-06-09

## 2020-07-15 ENCOUNTER — RECORDS - HEALTHEAST (OUTPATIENT)
Dept: ADMINISTRATIVE | Facility: OTHER | Age: 80
End: 2020-07-15

## 2020-07-16 ENCOUNTER — COMMUNICATION - HEALTHEAST (OUTPATIENT)
Dept: INTERNAL MEDICINE | Facility: CLINIC | Age: 80
End: 2020-07-16

## 2020-07-17 ENCOUNTER — COMMUNICATION - HEALTHEAST (OUTPATIENT)
Dept: INTERNAL MEDICINE | Facility: CLINIC | Age: 80
End: 2020-07-17

## 2020-07-17 DIAGNOSIS — E11.69 TYPE 2 DIABETES MELLITUS WITH OTHER SPECIFIED COMPLICATION, WITHOUT LONG-TERM CURRENT USE OF INSULIN (H): ICD-10-CM

## 2020-07-20 ENCOUNTER — RECORDS - HEALTHEAST (OUTPATIENT)
Dept: ADMINISTRATIVE | Facility: OTHER | Age: 80
End: 2020-07-20

## 2020-07-20 ENCOUNTER — OFFICE VISIT - HEALTHEAST (OUTPATIENT)
Dept: INTERNAL MEDICINE | Facility: CLINIC | Age: 80
End: 2020-07-20

## 2020-07-20 DIAGNOSIS — E11.8 TYPE 2 DIABETES MELLITUS WITH COMPLICATION, WITHOUT LONG-TERM CURRENT USE OF INSULIN (H): ICD-10-CM

## 2020-07-20 ASSESSMENT — PATIENT HEALTH QUESTIONNAIRE - PHQ9: SUM OF ALL RESPONSES TO PHQ QUESTIONS 1-9: 4

## 2020-07-21 ENCOUNTER — COMMUNICATION - HEALTHEAST (OUTPATIENT)
Dept: INTERNAL MEDICINE | Facility: CLINIC | Age: 80
End: 2020-07-21

## 2020-07-21 ENCOUNTER — AMBULATORY - HEALTHEAST (OUTPATIENT)
Dept: LAB | Facility: CLINIC | Age: 80
End: 2020-07-21

## 2020-07-21 DIAGNOSIS — E11.9 DIABETES MELLITUS WITHOUT COMPLICATION (H): ICD-10-CM

## 2020-07-21 DIAGNOSIS — E11.9 TYPE 2 DIABETES MELLITUS (H): ICD-10-CM

## 2020-07-21 DIAGNOSIS — E11.69 TYPE 2 DIABETES MELLITUS WITH OTHER SPECIFIED COMPLICATION, WITHOUT LONG-TERM CURRENT USE OF INSULIN (H): ICD-10-CM

## 2020-07-21 DIAGNOSIS — E11.8 TYPE 2 DIABETES MELLITUS WITH COMPLICATION, WITHOUT LONG-TERM CURRENT USE OF INSULIN (H): ICD-10-CM

## 2020-07-21 LAB
CHOLEST SERPL-MCNC: 182 MG/DL
CREAT UR-MCNC: 128.9 MG/DL
FASTING STATUS PATIENT QL REPORTED: YES
FASTING STATUS PATIENT QL REPORTED: YES
GLUCOSE BLD-MCNC: 189 MG/DL (ref 70–125)
HBA1C MFR BLD: 6.5 % (ref 3.5–6)
HDLC SERPL-MCNC: 51 MG/DL
LDLC SERPL CALC-MCNC: 113 MG/DL
MICROALBUMIN UR-MCNC: 1.06 MG/DL (ref 0–1.99)
MICROALBUMIN/CREAT UR: 8.2 MG/G
TRIGL SERPL-MCNC: 88 MG/DL

## 2020-07-23 ENCOUNTER — COMMUNICATION - HEALTHEAST (OUTPATIENT)
Dept: INTERNAL MEDICINE | Facility: CLINIC | Age: 80
End: 2020-07-23

## 2020-07-28 ENCOUNTER — RECORDS - HEALTHEAST (OUTPATIENT)
Dept: ADMINISTRATIVE | Facility: OTHER | Age: 80
End: 2020-07-28

## 2020-07-30 ENCOUNTER — RECORDS - HEALTHEAST (OUTPATIENT)
Dept: ADMINISTRATIVE | Facility: OTHER | Age: 80
End: 2020-07-30

## 2020-08-04 ENCOUNTER — RECORDS - HEALTHEAST (OUTPATIENT)
Dept: ADMINISTRATIVE | Facility: OTHER | Age: 80
End: 2020-08-04

## 2020-08-12 ENCOUNTER — RECORDS - HEALTHEAST (OUTPATIENT)
Dept: ADMINISTRATIVE | Facility: OTHER | Age: 80
End: 2020-08-12

## 2020-08-17 ENCOUNTER — OFFICE VISIT - HEALTHEAST (OUTPATIENT)
Dept: PHARMACY | Facility: CLINIC | Age: 80
End: 2020-08-17

## 2020-08-17 DIAGNOSIS — E11.9 TYPE 2 DIABETES MELLITUS WITHOUT COMPLICATION, WITHOUT LONG-TERM CURRENT USE OF INSULIN (H): ICD-10-CM

## 2020-08-17 DIAGNOSIS — M54.12 CERVICAL RADICULOPATHY: ICD-10-CM

## 2020-08-17 DIAGNOSIS — K44.9 DIAPHRAGMATIC HERNIA WITHOUT OBSTRUCTION AND WITHOUT GANGRENE: ICD-10-CM

## 2020-08-17 DIAGNOSIS — E78.5 HYPERLIPIDEMIA, UNSPECIFIED HYPERLIPIDEMIA TYPE: ICD-10-CM

## 2020-08-17 DIAGNOSIS — F43.22 ADJUSTMENT DISORDER WITH ANXIETY: ICD-10-CM

## 2020-08-17 PROCEDURE — 99605 MTMS BY PHARM NP 15 MIN: CPT | Performed by: PHARMACIST

## 2020-08-17 PROCEDURE — 99607 MTMS BY PHARM ADDL 15 MIN: CPT | Performed by: PHARMACIST

## 2020-09-23 DIAGNOSIS — G25.0 BENIGN ESSENTIAL TREMOR: Primary | ICD-10-CM

## 2020-09-23 RX ORDER — PROPRANOLOL HYDROCHLORIDE 10 MG/1
1 TABLET ORAL 3 TIMES DAILY PRN
COMMUNITY
Start: 2020-07-23 | End: 2020-09-23

## 2020-09-23 RX ORDER — PROPRANOLOL HYDROCHLORIDE 10 MG/1
10 TABLET ORAL 3 TIMES DAILY
Qty: 90 TABLET | Refills: 4 | Status: SHIPPED | OUTPATIENT
Start: 2020-09-23 | End: 2021-04-02

## 2020-09-29 ENCOUNTER — COMMUNICATION - HEALTHEAST (OUTPATIENT)
Dept: SCHEDULING | Facility: CLINIC | Age: 80
End: 2020-09-29

## 2020-09-29 ENCOUNTER — RECORDS - HEALTHEAST (OUTPATIENT)
Dept: ADMINISTRATIVE | Facility: OTHER | Age: 80
End: 2020-09-29

## 2020-10-29 ENCOUNTER — COMMUNICATION - HEALTHEAST (OUTPATIENT)
Dept: INTERNAL MEDICINE | Facility: CLINIC | Age: 80
End: 2020-10-29

## 2020-10-29 DIAGNOSIS — E88.810 METABOLIC SYNDROME: ICD-10-CM

## 2020-11-20 ENCOUNTER — OFFICE VISIT - HEALTHEAST (OUTPATIENT)
Dept: INTERNAL MEDICINE | Facility: CLINIC | Age: 80
End: 2020-11-20

## 2020-11-20 DIAGNOSIS — E11.8 TYPE 2 DIABETES MELLITUS WITH COMPLICATION, WITHOUT LONG-TERM CURRENT USE OF INSULIN (H): ICD-10-CM

## 2020-11-20 DIAGNOSIS — Z72.52 HIGH RISK HOMOSEXUAL BEHAVIOR: ICD-10-CM

## 2020-11-20 DIAGNOSIS — Z12.5 SCREENING FOR PROSTATE CANCER: ICD-10-CM

## 2020-11-20 LAB
CHOLEST SERPL-MCNC: 175 MG/DL
CREAT UR-MCNC: 164.3 MG/DL
FASTING STATUS PATIENT QL REPORTED: YES
FASTING STATUS PATIENT QL REPORTED: YES
GLUCOSE BLD-MCNC: 129 MG/DL (ref 70–125)
HBA1C MFR BLD: 6.6 %
HDLC SERPL-MCNC: 54 MG/DL
HIV 1+2 AB+HIV1 P24 AG SERPL QL IA: NEGATIVE
LDLC SERPL CALC-MCNC: 98 MG/DL
MICROALBUMIN UR-MCNC: 1.47 MG/DL (ref 0–1.99)
MICROALBUMIN/CREAT UR: 8.9 MG/G
PSA SERPL-MCNC: 3.5 NG/ML (ref 0–6.5)
TRIGL SERPL-MCNC: 114 MG/DL

## 2020-11-20 ASSESSMENT — MIFFLIN-ST. JEOR: SCORE: 1351.75

## 2020-11-23 ENCOUNTER — COMMUNICATION - HEALTHEAST (OUTPATIENT)
Dept: INTERNAL MEDICINE | Facility: CLINIC | Age: 80
End: 2020-11-23

## 2021-01-27 ENCOUNTER — RECORDS - HEALTHEAST (OUTPATIENT)
Dept: ADMINISTRATIVE | Facility: OTHER | Age: 81
End: 2021-01-27

## 2021-02-01 ENCOUNTER — COMMUNICATION - HEALTHEAST (OUTPATIENT)
Dept: SCHEDULING | Facility: CLINIC | Age: 81
End: 2021-02-01

## 2021-02-04 ENCOUNTER — RECORDS - HEALTHEAST (OUTPATIENT)
Dept: ADMINISTRATIVE | Facility: OTHER | Age: 81
End: 2021-02-04

## 2021-02-11 ENCOUNTER — COMMUNICATION - HEALTHEAST (OUTPATIENT)
Dept: ADMINISTRATIVE | Facility: CLINIC | Age: 81
End: 2021-02-11

## 2021-02-16 ENCOUNTER — OFFICE VISIT - HEALTHEAST (OUTPATIENT)
Dept: INTERNAL MEDICINE | Facility: CLINIC | Age: 81
End: 2021-02-16

## 2021-02-16 DIAGNOSIS — E11.8 TYPE 2 DIABETES MELLITUS WITH COMPLICATION, WITHOUT LONG-TERM CURRENT USE OF INSULIN (H): ICD-10-CM

## 2021-03-10 ENCOUNTER — RECORDS - HEALTHEAST (OUTPATIENT)
Dept: ADMINISTRATIVE | Facility: OTHER | Age: 81
End: 2021-03-10

## 2021-03-22 ENCOUNTER — OFFICE VISIT - HEALTHEAST (OUTPATIENT)
Dept: INTERNAL MEDICINE | Facility: CLINIC | Age: 81
End: 2021-03-22

## 2021-03-22 ENCOUNTER — COMMUNICATION - HEALTHEAST (OUTPATIENT)
Dept: INTERNAL MEDICINE | Facility: CLINIC | Age: 81
End: 2021-03-22

## 2021-03-22 DIAGNOSIS — E78.5 HLD (HYPERLIPIDEMIA): ICD-10-CM

## 2021-03-22 DIAGNOSIS — E11.8 TYPE 2 DIABETES MELLITUS WITH COMPLICATION, WITHOUT LONG-TERM CURRENT USE OF INSULIN (H): ICD-10-CM

## 2021-03-22 LAB
CHOLEST SERPL-MCNC: 150 MG/DL
FASTING STATUS PATIENT QL REPORTED: YES
FASTING STATUS PATIENT QL REPORTED: YES
GLUCOSE BLD-MCNC: 134 MG/DL (ref 70–125)
HBA1C MFR BLD: 6.9 %
HDLC SERPL-MCNC: 52 MG/DL
LDLC SERPL CALC-MCNC: 81 MG/DL
POTASSIUM BLD-SCNC: 4.4 MMOL/L (ref 3.5–5)
TRIGL SERPL-MCNC: 84 MG/DL

## 2021-03-22 ASSESSMENT — MIFFLIN-ST. JEOR: SCORE: 1351.75

## 2021-04-01 ENCOUNTER — COMMUNICATION - HEALTHEAST (OUTPATIENT)
Dept: SCHEDULING | Facility: CLINIC | Age: 81
End: 2021-04-01

## 2021-04-02 ENCOUNTER — OFFICE VISIT (OUTPATIENT)
Dept: NEUROLOGY | Facility: CLINIC | Age: 81
End: 2021-04-02
Payer: COMMERCIAL

## 2021-04-02 ENCOUNTER — RECORDS - HEALTHEAST (OUTPATIENT)
Dept: ADMINISTRATIVE | Facility: OTHER | Age: 81
End: 2021-04-02

## 2021-04-02 VITALS
DIASTOLIC BLOOD PRESSURE: 76 MMHG | BODY MASS INDEX: 25.07 KG/M2 | WEIGHT: 156 LBS | HEART RATE: 74 BPM | HEIGHT: 66 IN | SYSTOLIC BLOOD PRESSURE: 135 MMHG

## 2021-04-02 DIAGNOSIS — G25.0 BENIGN ESSENTIAL TREMOR: Primary | ICD-10-CM

## 2021-04-02 DIAGNOSIS — G45.4 TRANSIENT GLOBAL AMNESIA: ICD-10-CM

## 2021-04-02 PROCEDURE — 99204 OFFICE O/P NEW MOD 45 MIN: CPT | Performed by: PSYCHIATRY & NEUROLOGY

## 2021-04-02 RX ORDER — PROPRANOLOL HYDROCHLORIDE 10 MG/1
10 TABLET ORAL 2 TIMES DAILY
Qty: 180 TABLET | Refills: 3 | Status: SHIPPED | OUTPATIENT
Start: 2021-04-02 | End: 2022-04-18

## 2021-04-02 RX ORDER — LATANOPROST 50 UG/ML
1 SOLUTION/ DROPS OPHTHALMIC DAILY
COMMUNITY

## 2021-04-02 RX ORDER — GABAPENTIN 300 MG/1
CAPSULE ORAL
COMMUNITY
Start: 2021-01-27 | End: 2021-11-17

## 2021-04-02 RX ORDER — GABAPENTIN 600 MG/1
600 TABLET ORAL AT BEDTIME
COMMUNITY
Start: 2021-01-27 | End: 2021-11-17

## 2021-04-02 SDOH — HEALTH STABILITY: MENTAL HEALTH: HOW MANY STANDARD DRINKS CONTAINING ALCOHOL DO YOU HAVE ON A TYPICAL DAY?: NOT ASKED

## 2021-04-02 SDOH — HEALTH STABILITY: MENTAL HEALTH: HOW OFTEN DO YOU HAVE 6 OR MORE DRINKS ON ONE OCCASION?: NOT ASKED

## 2021-04-02 SDOH — HEALTH STABILITY: MENTAL HEALTH: HOW OFTEN DO YOU HAVE A DRINK CONTAINING ALCOHOL?: NOT ASKED

## 2021-04-02 ASSESSMENT — MONTREAL COGNITIVE ASSESSMENT (MOCA)
4. NAME EACH OF THE THREE ANIMALS SHOWN: 2
6. READ LIST OF DIGITS [FORWARD/BACKWARD]: 2
11. FOR EACH PAIR OF WORDS, WHAT CATEGORY DO THEY BELONG TO (OUT OF 2): 2
WHAT LEVEL OF EDUCATION WAS ATTAINED: 0
12. MEMORY INDEX SCORE: 4
10. [FLUENCY] NAME WORDS STARTING WITH DESIGNATED LETTER: 0
7. [VIGILENCE] TAP WHEN HEARING DESIGNATED LETTER: 1
VISUOSPATIAL/EXECUTIVE SUBSCORE: 3
9. REPEAT EACH SENTENCE: 1
8. SERIAL SUBTRACTION OF 7S: 3
13. ORIENTATION SUBSCORE: 6
WHAT IS THE TOTAL SCORE (OUT OF 30): 24

## 2021-04-02 ASSESSMENT — MIFFLIN-ST. JEOR: SCORE: 1370.36

## 2021-04-02 NOTE — LETTER
"    4/2/2021         RE: Wilfrid Ortez  [address]  [place] [address]        Dear Colleague,    Thank you for referring your patient, Wilfrid Ortez, to the [facility] [place]. Please see a copy of my visit note below.    In person evaluation        Chief Complaint   Memory difficulties new complaint  Evaluated in ER 4/1/2021      History of Present Illness   September 11, 2019 seen in person  April 2, 2021 in person evaluation      Patient being seen for a new complaint possible transient global amnesia    80-year-old who  April 1, 2021 had difficulty with amnesia  He had woke up in the afternoon at about 12:30 PM on 4/1/2021.  He seemed to be forgetting events of the morning.  It seemed that the memory difficulty or amnesia was transient  He was alone at the time that this was occurring  He went on to have an MRI scan of the brain that was unremarkable    Patient had seen a psychiatrist on March 18, 2021  Has had 8 panic attacks over 2 months.  More than usual    Patient states that on April 1, 2021 he had gotten up in the morning had breakfast  He then picked up a friend and took his friend from the [facility] over to the [facility] to drive him off for an appointment  He felt awful he was driving over there  He dropped him off and then left him  When he came home he talk to his partner stated that he was not feeling well  He laid down  He then awoke later and seemed to have forgotten what had happened over at least an hour to couple hours    10 to 15 years ago he was up at [facility] with his brother he was out on a boat  He supposedly \"passed out\" in reawoke 7 PM in the evening  Did not have any recollection  His 81 mg of aspirin was increased to full aspirin possible TIA  Unclear if that was an amnestic spell    He is on some propranolol 10 mg twice daily for his tremor cannot remember the middle of the day dose so he only takes " it twice a day  Does have some inhalers but does not get exacerbated by his propranolol    Formal [place] testing today was 24 out of 30    The patient does have difficulty with some head tremor, some voice tremor, and some hand tremor.    The patient notices the tremor more when he is more stressed or anxious.    He does have a past history of panic attacks, which are variable and uses lorazepam for this.        Past medical history    1. History of action tremor going on for five plus years evaluated by Dr. Michi Alanis in the past in 2016.  2. 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  3. Has panic attacks, is on lorazepam, which may give some of the fatigue.  4. Has a past note mentioning some mild memory loss.  5. Hyperlipidemia.  6. Obstructive sleep apnea going on for greater than six plus years.  7. Some visual changes with loss of vision in the left eye and droopiness of the right eye.  8.  Left carpal tunnel syndrome  9.  History of panic attacks with dizziness and off-balance sensation  10.  Diabetes mellitus.  Hemoglobin A1c's 6.9%, 6.6%, 7.3%    Past medical history  Anxiety/panic attacks  Hyperlipidemia  Diabetes  Benign prostatic hypertrophy  Hiatal hernia  Obstructive sleep apnea  Blepharospasm      Habits  Does not smoke  Does not drink alcohol  Patient is left-handed  Goals right-handed  Goals left-handed  Mouse on computer uses the right hand      Family history  Father with heart disease  at 57 also had rheumatic fever  Mother with cancer of the throat  at 72 also had diabetes mellitus.   Brother with epilepsy, DM  No family history of tremor    Past work-up  1. MRI scan of the head December 15, 2016 small vessel changes, small encephalomalacia in the posterior body of  the corpus callosum.  2. EEG on December 15, 2016, dysrhythmia grade II read by Dr. Taylor with some 8 Hz posterior dominant rhythm, some 5 to 6 Hz theta, a little bit of left temporal  "slowing.    Recent work-up  MRI scan brain April 1, 2021  A.  No acute stroke mass or bleed  B.  Mild to moderate atrophy and chronic small vessel changes  C.  Mild to moderate cerebellar atrophy   laboratory data April 1, 2021  Sodium 140 potassium 4.3  BUN 18 creatinine 1.2  Glucose 107  White blood count 6.5, hemoglobin 14.5, platelets 154,000  Sebastian cognitive assessment score April 2, 2021  24 out of 30    Previous writing samples in the chart.       Review of Systems   No headache no chest pain no shortness of breath no nausea vomiting no diarrhea no fever chills  No cough  No diplopia dysarthria dysphagia    Chronic tremor    Memory stabilized    No chest pain.   No abdominal pain  No recent illness    General exam  Blood pressure 135/76, pulse 74 temperature 98.3  HEENT normal  Lungs clear no wheezing  Heart rate regular  Abdomen soft  Symmetrical pulses  No edema the feet      Neurologic exam  Alert oriented x3  Normal prosody speech  Normal naming  Normal repetition  Normal comprehension  No neglect  Memory okay in the past has had some very subtle difficulty with memory  Formal Willard score 24 out of 30    Cranials 2 through 12  No ophthalmoplegia  No nystagmus  Face symmetrical  Decreased visual acuity left eye chronic  No visual field cut  Tongue twisters okay  Hypophonic voice      Parkinsonian features  Slight decrease in blink  Soft voice  Action tremor  Slight voice tremor  Not much head to titubation    Upper extremities  No drift  Mild action tremor  Finger-nose okay    Lower extremities  Test in the seated position good strength    Gait  Difficulty getting up from the chair  Has a click or problem with his hip  Antalgic gait uses the cane for balance    Reflexes symmetrical  No Swift reflex        Assessment/Plan       1.   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  CTS (carpal tunnel syndrome) (G56.02)        Patient is left-handed       Patient does play piano for most of his life       EMG September 11, 2019 shows moderate left CTS with active and chronic denervation          4.  Essential tremor (G25.0)        Continue propranolol 10 mg tablet 3 times per day as needed      At this time he has:    1. Essential tremor going on as far back as high school.  2. Anxiety disorder that exacerbates the tremor, which involves his head, speech, arms, right greater than left.  3. History of subtle memory changes, some changes on MRI scan and EEG changes in 2016.    1. His B12 level was adequate at 1197, TSH was 1.75.  2. At this time I feel that his anxiety disorder exacerbates an underlying essential tremor.  3. He can use the beta-blocker 10 mg p.r.n. maximum of three times per day. He is aware of some drowsiness with this.  4. He will follow up on a yearly basis for his tremor  5. We did discuss his laboratory data today also. He has a brother with some B12 deficiency, better to stay on his replacement even though it is a little bit high.  6. Complains of left shoulder pain radicular EMG of the left arm did not show any evidence of radiculopathy on September 11, 2019  7. EMG September 11, 2019 does show evidence of moderate carpal tunnel syndrome on the left patient play piano quite a bit of his life I feel he would get some improvement from some of his symptoms if the carpal tunnel was fixed      Patient has significant anxiety disorder  Patient possibly had a episode 10 to 15 years ago where he passed out versus having amnesia  Transient global amnesia type spell April 1, 2021  Was driving a friend to the hospital  Previously had some mild difficulty with memory 2016    Transient global amnesia versus panic  attack versus mild confusion    MRI scan at [facility] unremarkable    Check EEG  Then follow-up    51 total care time minutes  Patient emergency add-on after being in the ER  New complaint      Reviewed ER notes from 4/1/2021  Reviewed MRI scan from 4/1/2021  Reviewed labs from 4/1/2021  Psychiatry note March 18, 2021      Again, thank you for allowing me to participate in the care of your patient.        Sincerely,        Jairo Tamayo MD

## 2021-04-02 NOTE — NURSING NOTE
Chief Complaint   Patient presents with     Hospital F/U     Pt was seen in ED on 4/1/21 for transient confusion. Had MRI done then.     Ariadna Vincent LPN on 4/2/2021 at 2:02 PM

## 2021-04-02 NOTE — PROGRESS NOTES
"In person evaluation        Chief Complaint   Memory difficulties new complaint  Evaluated in ER 4/1/2021      History of Present Illness   September 11, 2019 seen in person  April 2, 2021 in person evaluation      Patient being seen for a new complaint possible transient global amnesia    80-year-old who  April 1, 2021 had difficulty with amnesia  He had woke up in the afternoon at about 12:30 PM on 4/1/2021.  He seemed to be forgetting events of the morning.  It seemed that the memory difficulty or amnesia was transient  He was alone at the time that this was occurring  He went on to have an MRI scan of the brain that was unremarkable    Patient had seen a psychiatrist on March 18, 2021  Has had 8 panic attacks over 2 months.  More than usual    Patient states that on April 1, 2021 he had gotten up in the morning had breakfast  He then picked up a friend and took his friend from the [facility] over to the [facility] to drive him off for an appointment  He felt awful he was driving over there  He dropped him off and then left him  When he came home he talk to his partner stated that he was not feeling well  He laid down  He then awoke later and seemed to have forgotten what had happened over at least an hour to couple hours    10 to 15 years ago he was up at [facility] with his brother he was out on a boat  He supposedly \"passed out\" in [facility] 7 PM in the evening  Did not have any recollection  His 81 mg of aspirin was increased to full aspirin possible TIA  Unclear if that was an amnestic spell    He is on some propranolol 10 mg twice daily for his tremor cannot remember the middle of the day dose so he only takes it twice a day  Does have some inhalers but does not get exacerbated by his propranolol    Formal [place] testing today was 24 out of 30    The patient does have difficulty with some head tremor, some voice tremor, and some hand tremor.    The patient notices the tremor more when he " is more stressed or anxious.    He does have a past history of panic attacks, which are variable and uses lorazepam for this.        Past medical history    1. History of action tremor going on for five plus years evaluated by Dr. Michi Alanis in the past in 2016.  2. 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  3. Has panic attacks, is on lorazepam, which may give some of the fatigue.  4. Has a past note mentioning some mild memory loss.  5. Hyperlipidemia.  6. Obstructive sleep apnea going on for greater than six plus years.  7. Some visual changes with loss of vision in the left eye and droopiness of the right eye.  8.  Left carpal tunnel syndrome  9.  History of panic attacks with dizziness and off-balance sensation  10.  Diabetes mellitus.  Hemoglobin A1c's 6.9%, 6.6%, 7.3%    Past medical history  Anxiety/panic attacks  Hyperlipidemia  Diabetes  Benign prostatic hypertrophy  Hiatal hernia  Obstructive sleep apnea  Blepharospasm      Habits  Does not smoke  Does not drink alcohol  Patient is left-handed  Goals right-handed  Goals left-handed  Mouse on computer uses the right hand      Family history  Father with heart disease  at 57 also had rheumatic fever  Mother with cancer of the throat  at 72 also had diabetes mellitus.   Brother with epilepsy, DM  No family history of tremor    Past work-up  1. MRI scan of the head December 15, 2016 small vessel changes, small encephalomalacia in the posterior body of  the corpus callosum.  2. EEG on December 15, 2016, dysrhythmia grade II read by Dr. Taylor with some 8 Hz posterior dominant rhythm, some 5 to 6 Hz theta, a little bit of left temporal slowing.    Recent work-up  MRI scan brain 2021  A.  No acute stroke mass or bleed  B.  Mild to moderate atrophy and chronic small vessel changes  C.  Mild to moderate cerebellar atrophy   laboratory data 2021  Sodium 140 potassium 4.3  BUN 18 creatinine  "1.2  Glucose 107  White blood count 6.5, hemoglobin 14.5, platelets 154,000  Darragh cognitive assessment score April 2, 2021  24 out of 30    Previous writing samples in the chart.       Review of Systems   No headache no chest pain no shortness of breath no nausea vomiting no diarrhea no fever chills  No cough  No diplopia dysarthria dysphagia    Chronic tremor    Memory stabilized    No chest pain.   No abdominal pain  No recent illness    General exam  Blood pressure 135/76, pulse 74 temperature 98.3  HEENT normal  Lungs clear no wheezing  Heart rate regular  Abdomen soft  Symmetrical pulses  No edema the feet      Neurologic exam  Alert oriented x3  Normal prosody speech  Normal naming  Normal repetition  Normal comprehension  No neglect  Memory okay in the past has had some very subtle difficulty with memory  Formal Ragland score 24 out of 30    Cranials 2 through 12  No ophthalmoplegia  No nystagmus  Face symmetrical  Decreased visual acuity left eye chronic  No visual field cut  Tongue twisters okay  Hypophonic voice      Parkinsonian features  Slight decrease in blink  Soft voice  Action tremor  Slight voice tremor  Not much head to titubation    Upper extremities  No drift  Mild action tremor  Finger-nose okay    Lower extremities  Test in the seated position good strength    Gait  Difficulty getting up from the chair  Has a click or problem with his hip  Antalgic gait uses the cane for balance    Reflexes symmetrical  No Swift reflex        Assessment/Plan       1.   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  CTS (carpal tunnel syndrome) (G56.02)        Patient is left-handed       Patient does play piano for most " of his life       EMG September 11, 2019 shows moderate left CTS with active and chronic denervation          4.  Essential tremor (G25.0)        Continue propranolol 10 mg tablet 3 times per day as needed      At this time he has:    1. Essential tremor going on as far back as high school.  2. Anxiety disorder that exacerbates the tremor, which involves his head, speech, arms, right greater than left.  3. History of subtle memory changes, some changes on MRI scan and EEG changes in 2016.    1. His B12 level was adequate at 1197, TSH was 1.75.  2. At this time I feel that his anxiety disorder exacerbates an underlying essential tremor.  3. He can use the beta-blocker 10 mg p.r.n. maximum of three times per day. He is aware of some drowsiness with this.  4. He will follow up on a yearly basis for his tremor  5. We did discuss his laboratory data today also. He has a brother with some B12 deficiency, better to stay on his replacement even though it is a little bit high.  6. Complains of left shoulder pain radicular EMG of the left arm did not show any evidence of radiculopathy on September 11, 2019  7. EMG September 11, 2019 does show evidence of moderate carpal tunnel syndrome on the left patient play piano quite a bit of his life I feel he would get some improvement from some of his symptoms if the carpal tunnel was fixed      Patient has significant anxiety disorder  Patient possibly had a episode 10 to 15 years ago where he passed out versus having amnesia  Transient global amnesia type spell April 1, 2021  Was driving a friend to the hospital  Previously had some mild difficulty with memory 2016    Transient global amnesia versus panic attack versus mild confusion    MRI scan at [facility] unremarkable    Check EEG  Then follow-up    51 total care time minutes  Patient emergency add-on after being in the ER  New complaint      Reviewed ER notes from 4/1/2021  Reviewed MRI scan from 4/1/2021  Reviewed labs from  4/1/2021  Psychiatry note March 18, 2021

## 2021-04-02 NOTE — NURSING NOTE
NICOLE COGNITIVE ASSESSMENT (MOCA)  Version 7.1 Original Version  VISUOSPATIAL/EXECUTIVE               COPY CUBE      [ 1   ]                                [  0  ] DRAW CLOCK (Ten past eleven)  (3 points)    [1    ]                    [  0  ]               [   1 ]       Contour            Numbers     Hands POINTS                  3 / 5   NAMING    [1   ]                                                                        [ 0   ]                                             [ 1   ]  Linoah Wheeler                                Camel                    2 / 3   MEMORY Read list of words, subject must repeat them. Do 2 trials, even if 1st trial is successful. Do a recall after 5 minutes  FACE VELVET Gnosticism RICHA RED No Points    1st          2nd         ATTENTION Read list of digits (1 digit/sec) Subject has to repeat in the forward order       [   1 ]   2  1  8  5  4                                [  1  ] 7 4 2                         2 /2   Read list of letters. The subject must tap with his hand at each letter A. No points if > 2 errors.  [ 1   ] F B A C M N A A J K L B A F A K D E A A A J A M O F A A B             1 /1   Serial 7 subtraction starting at 100          [ 1   ] 93         [   1 ] 86          [ 1   ] 79          [  1  ] 72         [  1  ] 65   4 or 5 correct subtractions: 3 points,  2 or 3 correct: 2 points,  1correct: 1 point,   0 correct: 0 points          3  /3   LANGUAGE Repeat: I only know that Yang is the one to help today. [   0  ]                                      The cat always hid under the couch when dogs were in the room. [ 1  ]              2 /2   Fluency: Name maximum number of words in one minute that begin with the letter F                                                                                                                    [ 0   ] ___ (N > 11 words)            0   /1   ABSTRACTION Similarity  between e.g. banana-orange=fruit                                                                   [   1 ] train-bicycle                      [ 1   ] watch-ruler             2 /2   DELAYED  RECALL Has to recall words  WITH NO CUE FACE  [  0  ] VELVET  [   1 ] Yazidi  [  1  ]  RICHA  [ 1   ] RED  [  1  ] Points for UNCUED recall only          4  /5           OPTIONAL Category cue           Multiple choice cue          ORIENTATION  [   1 ] Date     [  1  ] Month       [   1 ] Year      [   1 ] Day      [ 1   ] Place        [ 1   ] City        6 /6   TOTAL  Normal > 26/30 Add 1 point if < 12 years education    24 /30

## 2021-04-09 ENCOUNTER — COMMUNICATION - HEALTHEAST (OUTPATIENT)
Dept: INTERNAL MEDICINE | Facility: CLINIC | Age: 81
End: 2021-04-09

## 2021-04-09 DIAGNOSIS — Z00.00 ROUTINE GENERAL MEDICAL EXAMINATION AT A HEALTH CARE FACILITY: ICD-10-CM

## 2021-04-18 ENCOUNTER — AMBULATORY - HEALTHEAST (OUTPATIENT)
Dept: SURGERY | Facility: CLINIC | Age: 81
End: 2021-04-18

## 2021-04-18 DIAGNOSIS — Z11.59 ENCOUNTER FOR SCREENING FOR OTHER VIRAL DISEASES: ICD-10-CM

## 2021-04-20 ENCOUNTER — OFFICE VISIT - HEALTHEAST (OUTPATIENT)
Dept: INTERNAL MEDICINE | Facility: CLINIC | Age: 81
End: 2021-04-20

## 2021-04-20 DIAGNOSIS — Z01.818 PREOPERATIVE EXAMINATION: ICD-10-CM

## 2021-04-20 LAB
ALBUMIN SERPL-MCNC: 4.1 G/DL (ref 3.5–5)
ALBUMIN UR-MCNC: NEGATIVE G/DL
ALP SERPL-CCNC: 117 U/L (ref 45–120)
ALT SERPL W P-5'-P-CCNC: 15 U/L (ref 0–45)
ANION GAP SERPL CALCULATED.3IONS-SCNC: 9 MMOL/L (ref 5–18)
APPEARANCE UR: CLEAR
AST SERPL W P-5'-P-CCNC: 19 U/L (ref 0–40)
ATRIAL RATE - MUSE: 75 BPM
BILIRUB SERPL-MCNC: 0.5 MG/DL (ref 0–1)
BILIRUB UR QL STRIP: NEGATIVE
BUN SERPL-MCNC: 19 MG/DL (ref 8–28)
CALCIUM SERPL-MCNC: 9.4 MG/DL (ref 8.5–10.5)
CHLORIDE BLD-SCNC: 98 MMOL/L (ref 98–107)
CO2 SERPL-SCNC: 30 MMOL/L (ref 22–31)
COLOR UR AUTO: YELLOW
CREAT SERPL-MCNC: 1.23 MG/DL (ref 0.7–1.3)
DIASTOLIC BLOOD PRESSURE - MUSE: NORMAL
ERYTHROCYTE [DISTWIDTH] IN BLOOD BY AUTOMATED COUNT: 13.4 % (ref 11–14.5)
GFR SERPL CREATININE-BSD FRML MDRD: 57 ML/MIN/1.73M2
GLUCOSE BLD-MCNC: 135 MG/DL (ref 70–125)
GLUCOSE UR STRIP-MCNC: ABNORMAL MG/DL
HCT VFR BLD AUTO: 42.8 % (ref 40–54)
HGB BLD-MCNC: 14.6 G/DL (ref 14–18)
HGB UR QL STRIP: NEGATIVE
INTERPRETATION ECG - MUSE: NORMAL
KETONES UR STRIP-MCNC: NEGATIVE MG/DL
LEUKOCYTE ESTERASE UR QL STRIP: NEGATIVE
MCH RBC QN AUTO: 30 PG (ref 27–34)
MCHC RBC AUTO-ENTMCNC: 34.1 G/DL (ref 32–36)
MCV RBC AUTO: 88 FL (ref 80–100)
NITRATE UR QL: NEGATIVE
P AXIS - MUSE: 59 DEGREES
PH UR STRIP: 6 [PH] (ref 5–8)
PLATELET # BLD AUTO: 158 THOU/UL (ref 140–440)
PMV BLD AUTO: 10.1 FL (ref 7–10)
POTASSIUM BLD-SCNC: 4.6 MMOL/L (ref 3.5–5)
PR INTERVAL - MUSE: 172 MS
PROT SERPL-MCNC: 7.4 G/DL (ref 6–8)
QRS DURATION - MUSE: 88 MS
QT - MUSE: 378 MS
QTC - MUSE: 422 MS
R AXIS - MUSE: 13 DEGREES
RBC # BLD AUTO: 4.87 MILL/UL (ref 4.4–6.2)
SODIUM SERPL-SCNC: 137 MMOL/L (ref 136–145)
SP GR UR STRIP: 1.02 (ref 1–1.03)
SYSTOLIC BLOOD PRESSURE - MUSE: NORMAL
T AXIS - MUSE: 31 DEGREES
UROBILINOGEN UR STRIP-ACNC: ABNORMAL
VENTRICULAR RATE- MUSE: 75 BPM
WBC: 7.6 THOU/UL (ref 4–11)

## 2021-04-20 ASSESSMENT — MIFFLIN-ST. JEOR: SCORE: 1331.35

## 2021-04-22 ENCOUNTER — COMMUNICATION - HEALTHEAST (OUTPATIENT)
Dept: INTERNAL MEDICINE | Facility: CLINIC | Age: 81
End: 2021-04-22

## 2021-04-29 ENCOUNTER — RECORDS - HEALTHEAST (OUTPATIENT)
Dept: ADMINISTRATIVE | Facility: OTHER | Age: 81
End: 2021-04-29

## 2021-04-29 ASSESSMENT — MIFFLIN-ST. JEOR: SCORE: 1335.88

## 2021-04-30 ENCOUNTER — AMBULATORY - HEALTHEAST (OUTPATIENT)
Dept: LAB | Facility: CLINIC | Age: 81
End: 2021-04-30

## 2021-04-30 DIAGNOSIS — Z11.59 ENCOUNTER FOR SCREENING FOR OTHER VIRAL DISEASES: ICD-10-CM

## 2021-05-01 LAB
SARS-COV-2 PCR COMMENT: NORMAL
SARS-COV-2 RNA SPEC QL NAA+PROBE: NEGATIVE
SARS-COV-2 VIRUS SPECIMEN SOURCE: NORMAL

## 2021-05-02 ENCOUNTER — COMMUNICATION - HEALTHEAST (OUTPATIENT)
Dept: SCHEDULING | Facility: CLINIC | Age: 81
End: 2021-05-02

## 2021-05-04 ENCOUNTER — SURGERY - HEALTHEAST (OUTPATIENT)
Dept: SURGERY | Facility: CLINIC | Age: 81
End: 2021-05-04
Payer: COMMERCIAL

## 2021-05-04 ENCOUNTER — RECORDS - HEALTHEAST (OUTPATIENT)
Dept: ADMINISTRATIVE | Facility: OTHER | Age: 81
End: 2021-05-04

## 2021-05-04 ENCOUNTER — ANESTHESIA - HEALTHEAST (OUTPATIENT)
Dept: SURGERY | Facility: CLINIC | Age: 81
End: 2021-05-04

## 2021-05-04 ASSESSMENT — MIFFLIN-ST. JEOR
SCORE: 1323.64
SCORE: 1324.09

## 2021-05-10 ENCOUNTER — OFFICE VISIT - HEALTHEAST (OUTPATIENT)
Dept: PHARMACY | Facility: CLINIC | Age: 81
End: 2021-05-10

## 2021-05-10 DIAGNOSIS — E78.5 HYPERLIPIDEMIA, UNSPECIFIED HYPERLIPIDEMIA TYPE: ICD-10-CM

## 2021-05-10 DIAGNOSIS — R39.14 BENIGN PROSTATIC HYPERPLASIA WITH INCOMPLETE BLADDER EMPTYING: ICD-10-CM

## 2021-05-10 DIAGNOSIS — E11.69 TYPE 2 DIABETES MELLITUS WITH OTHER SPECIFIED COMPLICATION, WITHOUT LONG-TERM CURRENT USE OF INSULIN (H): ICD-10-CM

## 2021-05-10 DIAGNOSIS — M16.11 PRIMARY OSTEOARTHRITIS OF RIGHT HIP: ICD-10-CM

## 2021-05-10 DIAGNOSIS — F43.22 ADJUSTMENT DISORDER WITH ANXIETY: ICD-10-CM

## 2021-05-10 DIAGNOSIS — N40.1 BENIGN PROSTATIC HYPERPLASIA WITH INCOMPLETE BLADDER EMPTYING: ICD-10-CM

## 2021-05-10 DIAGNOSIS — K44.9 DIAPHRAGMATIC HERNIA WITHOUT OBSTRUCTION AND WITHOUT GANGRENE: ICD-10-CM

## 2021-05-10 PROCEDURE — 99607 MTMS BY PHARM ADDL 15 MIN: CPT | Performed by: PHARMACIST

## 2021-05-10 PROCEDURE — 99605 MTMS BY PHARM NP 15 MIN: CPT | Performed by: PHARMACIST

## 2021-05-12 ENCOUNTER — COMMUNICATION - HEALTHEAST (OUTPATIENT)
Dept: HOME HEALTH SERVICES | Facility: HOME HEALTH | Age: 81
End: 2021-05-12

## 2021-05-17 ENCOUNTER — COMMUNICATION - HEALTHEAST (OUTPATIENT)
Dept: INTERNAL MEDICINE | Facility: CLINIC | Age: 81
End: 2021-05-17

## 2021-05-18 ENCOUNTER — RECORDS - HEALTHEAST (OUTPATIENT)
Dept: ADMINISTRATIVE | Facility: OTHER | Age: 81
End: 2021-05-18

## 2021-05-24 ENCOUNTER — RECORDS - HEALTHEAST (OUTPATIENT)
Dept: ADMINISTRATIVE | Facility: CLINIC | Age: 81
End: 2021-05-24

## 2021-05-24 ENCOUNTER — COMMUNICATION - HEALTHEAST (OUTPATIENT)
Dept: INTERNAL MEDICINE | Facility: CLINIC | Age: 81
End: 2021-05-24

## 2021-05-26 ENCOUNTER — TELEPHONE (OUTPATIENT)
Dept: CARE COORDINATION | Facility: CLINIC | Age: 81
End: 2021-05-26

## 2021-05-26 NOTE — TELEPHONE ENCOUNTER
Please disregard the previous message asking for approval of home care ST orders. During case conference today it was determined Pt has no further ST needs, and will be discharging from home care Friday 5/28/21.    Thank you,    Ruby Watters MA, CCC-SLP  Speech-Language Pathologist  [facility]  [email or web address]  [phone]

## 2021-05-26 NOTE — TELEPHONE ENCOUNTER
[facility] now requests orders and shares plan of care/discharge summaries for some patients through [employer].  Please REPLY TO THIS MESSAGE OR ROUTE BACK TO THE AUTHOR in order to give authorization for orders when needed.  This is considered a verbal order, you will still receive a faxed copy of orders for signature.  Thank you for your assistance in improving collaboration for our patients.    ORDER  Requesting the ok for home care speech therapy eval to take place next week, beginning 6/1/21.  Eval originally ordered on home care admit, however Pt has not responded to any scheduling calls and a new order is now needed.    Thank you,    Ruby Watters MA, CCC-SLP  Speech-Language Pathologist  [facility]  [email or web address]  [phone]

## 2021-05-27 ENCOUNTER — COMMUNICATION - HEALTHEAST (OUTPATIENT)
Dept: INTERNAL MEDICINE | Facility: CLINIC | Age: 81
End: 2021-05-27

## 2021-05-27 VITALS
BODY MASS INDEX: 25.96 KG/M2 | WEIGHT: 150.4 LBS | BODY MASS INDEX: 24.26 KG/M2 | BODY MASS INDEX: 25.03 KG/M2 | WEIGHT: 150.3 LBS | HEIGHT: 65 IN

## 2021-05-27 NOTE — TELEPHONE ENCOUNTER
Agree w/ plans fpr ED or UC eval and treat and otc meclizine 25mg no 100 one three times a day prn dizzyness

## 2021-05-27 NOTE — TELEPHONE ENCOUNTER
Reason for Disposition    Spinning or tilting sensation (vertigo) present now    Protocols used: DIZZINESS-A-OH

## 2021-05-27 NOTE — TELEPHONE ENCOUNTER
Left detailed message for the patient relaying message below from Dr. Carlos.  Asked the patient to call back with any further questions.  Malena DIALLO, JEWELL/CMT....................5:31 PM

## 2021-05-27 NOTE — TELEPHONE ENCOUNTER
Patient calling.    He is reporting that he has  Had 1 week of vertigo.  He reports thinking that he is having a PANIC ATTACK, and then he takes a lorazepam, and it gets better.    Patient was advised that it may be BPV, and it was explained to him how that   Works, It was recommended that he go to  , to have an evaluation done.    Patient admits to being hydrated with plenty of propel and water.  Maya Boss RN  [facility] Triage/refill nurse

## 2021-05-28 ENCOUNTER — RECORDS - HEALTHEAST (OUTPATIENT)
Dept: ADMINISTRATIVE | Facility: CLINIC | Age: 81
End: 2021-05-28

## 2021-05-30 ENCOUNTER — RECORDS - HEALTHEAST (OUTPATIENT)
Dept: ADMINISTRATIVE | Facility: CLINIC | Age: 81
End: 2021-05-30

## 2021-05-30 VITALS — WEIGHT: 163 LBS | BODY MASS INDEX: 26.2 KG/M2 | HEIGHT: 66 IN

## 2021-05-30 VITALS — HEIGHT: 66 IN | BODY MASS INDEX: 26.03 KG/M2 | WEIGHT: 162 LBS

## 2021-05-30 NOTE — PROGRESS NOTES
Office Visit - Follow up    Wilfrid Ortez   78 y.o. male    Date of Visit: 7/2/2019    Chief Complaint   Patient presents with     Diabetes     Hyperlipidemia     Follow-up     Numbness     Fingers and toes       Subjective: Diabetes mellitus type 2.  With hyperlipidemia.    Dorsum left arm numbness paresthesias.  No speech or language or visual problems.    Diabetic type II.  No blood in stool or urine no chest pain or shortness of breath medication list reconciled in the chart well-tolerated.  Prior history of BPH has not followed my advice regarding seeing [place] [facility]ist Dr. GALLARDO but he well now.  Neurologic consultation also recommended regarding paresthesias dorsum left arm.    ROS: A comprehensive review of systems was performed and was otherwise negative    Medications:  Prior to Admission medications    Medication Sig Start Date End Date Taking? Authorizing Provider   aspirin 325 MG tablet Take 325 mg by mouth daily.   Yes PROVIDER, HISTORICAL   blood glucose test (ACCU-CHEK SMARTVIEW TEST STRIP) strips Test each day as directed. 2/23/18  Yes Mayito Carlos MD   blood-glucose meter Misc Use to test once per day 2/23/18  Yes Mayito Carlos MD   dorzolamide-timolol (COSOPT) 22.3-6.8 mg/mL ophthalmic solution APPLY 1 DROP IN BOTH EYES BID 6/13/18  Yes PROVIDER, HISTORICAL   fluticasone propion-salmeterol (ADVAIR) 250-50 mcg/dose DISKUS Inhale 1 puff 2 (two) times a day. 4/1/19  Yes Mayito Carlos MD   lancets (ACCU-CHEK FASTCLIX) Misc Test each day as directed. 7/28/15  Yes Mayito Carlos MD   latanoprost (XALATAN) 0.005 % ophthalmic solution Administer 1 drop to both eyes at bedtime.   Yes PROVIDER, HISTORICAL   LORazepam (ATIVAN) 0.5 MG tablet TK 1 T PO QD PRF ANXIETY AND 1-2 TS PO QHS 5/17/18  Yes PROVIDER, HISTORICAL   omeprazole (PRILOSEC) 20 MG capsule TAKE 1 CAPSULE(20 MG) BY MOUTH DAILY 5/19/19  Yes Mayito Carlos MD   PARoxetine (PAXIL) 20 MG tablet Take 40 mg by  mouth every morning. For a total dose of 50 mg a day.   Yes PROVIDER, HISTORICAL   propranolol (INDERAL) 10 MG tablet Take 10-20 mg by mouth. 6/15/18  Yes PROVIDER, HISTORICAL   simvastatin (ZOCOR) 20 MG tablet TAKE ONE TABLET BY MOUTH EVERY EVENING 4/1/19  Yes Mayito Carlos MD       Allergies:   Allergies   Allergen Reactions     Ibuprofen Nausea Only       Immunizations:   Immunization History   Administered Date(s) Administered     DT (pediatric) 04/12/2004     Influenza [id number], 01/15/2010     Influenza high dose, seasonal 10/27/2015, 11/07/2016, 01/04/2018, 10/30/2018     Influenza, inj, historic,unspecified 10/22/2007, 10/20/2008, 09/15/2009, 09/23/2010, 10/21/2011     Influenza, seasonal,quad inj 6-35 mos 12/10/2012, 09/23/2013, 09/23/2014     Pneumo Conj 13-V (2010&after) 07/28/2015     Pneumo Polysac 23-V 10/20/2008     Td,adult,historic,unspecified 04/12/2004, 01/23/2014     Tdap 01/23/2014     ZOSTER, LIVE 01/01/1900, 06/20/2012       Exam Chest clear to auscultation and percussion.  Heart tones regular rhythm without murmur rub or gallop.  Abdomen soft nontender no organomegaly.  No peritoneal signs.  Extremities free of edema cyanosis or clubbing.  Neck veins nondistended no thyromegaly or scleral icterus noted, carotids full.  Skin warm and dry easily conversant good spirited.  Normal intelligence.  Neurologically intact no gross localizing findings.    Assessment and Plan  Diabetes mellitus type 2 check A1c blood sugar lipid panel urine for microalbumin today.    Paresthesias left arm dorsal aspect suggest neurologic consultation BPH with prostatism suggest reconsultation with Dr. GALLARDO at [place] [facility].  [phone].  [phone].    Hyperlipidemia on statin therapy no history of target organ damage related to same.  No history of MI or stroke no history of xanthoma or xanthelasma.  RTC 3 months time fasting.    Time: total time spent with the patient was 25 minutes of which >50% was spent in  counseling and coordination of care        Mayito Carlos MD    Patient Active Problem List   Diagnosis     Hiatal Hernia     Lyme Disease     Benign Prostatic Hypertrophy     Type 2 Diabetes Mellitus - Uncomplicated, Controlled     Hyperlipidemia     Adjustment Disorder With Anxiety     Basal Cell Carcinoma Of The Skin

## 2021-05-31 ENCOUNTER — RECORDS - HEALTHEAST (OUTPATIENT)
Dept: ADMINISTRATIVE | Facility: CLINIC | Age: 81
End: 2021-05-31

## 2021-05-31 VITALS — WEIGHT: 165 LBS | BODY MASS INDEX: 26.52 KG/M2 | HEIGHT: 66 IN

## 2021-05-31 VITALS — HEIGHT: 66 IN | BODY MASS INDEX: 26.03 KG/M2 | WEIGHT: 162 LBS

## 2021-05-31 VITALS — BODY MASS INDEX: 26.36 KG/M2 | HEIGHT: 66 IN | WEIGHT: 164 LBS

## 2021-05-31 NOTE — TELEPHONE ENCOUNTER
Refill Approved    Rx renewed per Medication Renewal Policy. Medication was last renewed on 5/19/19.    Roseanne Lozano, [facility] Triage/Med Refill 8/16/2019     Requested Prescriptions   Pending Prescriptions Disp Refills     omeprazole (PRILOSEC) 20 MG capsule [Pharmacy Med Name: OMEPRAZOLE 20MG CAPSULES] 90 capsule 0     Sig: TAKE 1 CAPSULE(20 MG) BY MOUTH DAILY       GI Medications Refill Protocol Passed - 8/16/2019  9:34 AM        Passed - PCP or prescribing provider visit in last 12 or next 3 months.     Last office visit with prescriber/PCP: 7/2/2019 Mayito Carlos MD OR same dept: 7/2/2019 Mayito Carlos MD OR same specialty: 7/2/2019 Mayito Carlos MD  Last physical: 2/12/2019 Last MTM visit: Visit date not found   Next visit within 3 mo: Visit date not found  Next physical within 3 mo: Visit date not found  Prescriber OR PCP: Mayito Carlos MD  Last diagnosis associated with med order: 1. Routine general medical examination at a health care facility  - omeprazole (PRILOSEC) 20 MG capsule [Pharmacy Med Name: OMEPRAZOLE 20MG CAPSULES]; TAKE 1 CAPSULE(20 MG) BY MOUTH DAILY  Dispense: 90 capsule; Refill: 0    If protocol passes may refill for 12 months if within 3 months of last provider visit (or a total of 15 months).

## 2021-06-01 VITALS — WEIGHT: 164 LBS | HEIGHT: 66 IN | BODY MASS INDEX: 26.36 KG/M2

## 2021-06-01 VITALS — WEIGHT: 163 LBS | BODY MASS INDEX: 26.2 KG/M2 | HEIGHT: 66 IN

## 2021-06-01 VITALS — WEIGHT: 161 LBS | BODY MASS INDEX: 25.88 KG/M2 | HEIGHT: 66 IN

## 2021-06-02 VITALS — BODY MASS INDEX: 25.39 KG/M2 | HEIGHT: 66 IN | WEIGHT: 158 LBS

## 2021-06-02 VITALS — HEIGHT: 66 IN | BODY MASS INDEX: 25.88 KG/M2 | WEIGHT: 161 LBS

## 2021-06-02 VITALS — BODY MASS INDEX: 25.71 KG/M2 | WEIGHT: 160 LBS | HEIGHT: 66 IN

## 2021-06-02 VITALS — WEIGHT: 159 LBS | HEIGHT: 66 IN | BODY MASS INDEX: 25.55 KG/M2

## 2021-06-02 VITALS — WEIGHT: 157 LBS | HEIGHT: 66 IN | BODY MASS INDEX: 25.23 KG/M2

## 2021-06-02 NOTE — PROGRESS NOTES
Office Visit - Follow up    Wilfrid Ortez   78 y.o. male    Date of Visit: 10/18/2019    Chief Complaint   Patient presents with     Diabetes     Hyperlipidemia     Tremors     Medication Questions       Subjective: Diabetes mellitus type 2 fasting today.    History of hyperlipidemia.    Tremors intention.  Seen by Dr. Jairo Campos of neurology.  Questions regarding medication answered.  Dr. Campos did a formal testing with EMG to evaluate numbness or tingling in his hands with no associated weakness.  He is left-hand dominant.  He enjoys playing the piano.  The patient has requested some through the Internet Mendoza like compound for erectile dysfunction.  Questions were answered regarding it.  Last laboratory tests reviewed from July 2, 2019.    Dr. Campos the neurologist did an EMG test on both upper extremities there is evidence for mild to moderate carpal tunnel impingement on the left side he is left-hand dominant.  Dr. Campos appropriately sent him to [facility].  The orthopedic hand specialist at [facility] did not think him a surgical candidate.    Flu shot given right deltoid.    Colonoscopy dated February 25, 2015 with Dr. Bonnie palm.    Non-smoker no excess alcohol allergies include only ibuprofen.    ROS: A comprehensive review of systems was performed and was otherwise negative    Medications:  Prior to Admission medications    Medication Sig Start Date End Date Taking? Authorizing Provider   aspirin 325 MG tablet Take 325 mg by mouth daily.   Yes PROVIDER, HISTORICAL   blood glucose test (ACCU-CHEK SMARTVIEW TEST STRIP) strips Test each day as directed. 2/23/18  Yes Mayito Carlos MD   blood-glucose meter Misc Use to test once per day 2/23/18  Yes Mayito Carlos MD   dorzolamide-timolol (COSOPT) 22.3-6.8 mg/mL ophthalmic solution APPLY 1 DROP IN BOTH EYES BID 6/13/18  Yes PROVIDER, HISTORICAL   fluticasone propion-salmeterol (ADVAIR) 250-50 mcg/dose DISKUS Inhale 1  puff 2 (two) times a day. 4/1/19  Yes Mayito Carlos MD   lancets (ACCU-CHEK FASTCLIX) Misc Test each day as directed. 7/28/15  Yes Mayito Carlos MD   latanoprost (XALATAN) 0.005 % ophthalmic solution Administer 1 drop to both eyes at bedtime.   Yes PROVIDER, HISTORICAL   LORazepam (ATIVAN) 0.5 MG tablet TK 1 T PO QD PRF ANXIETY AND 1-2 TS PO QHS 5/17/18  Yes PROVIDER, HISTORICAL   omeprazole (PRILOSEC) 20 MG capsule TAKE 1 CAPSULE(20 MG) BY MOUTH DAILY 8/16/19  Yes Mayito Carlos MD   PARoxetine (PAXIL) 20 MG tablet Take 40 mg by mouth every morning. For a total dose of 50 mg a day.   Yes PROVIDER, HISTORICAL   propranolol (INDERAL) 10 MG tablet Take 10-20 mg by mouth. 6/15/18  Yes PROVIDER, HISTORICAL   simvastatin (ZOCOR) 20 MG tablet TAKE ONE TABLET BY MOUTH EVERY EVENING 4/1/19  Yes Mayito Carlos MD       Allergies:   Allergies   Allergen Reactions     Ibuprofen Nausea Only       Immunizations:   Immunization History   Administered Date(s) Administered     DT (pediatric) 04/12/2004     Influenza [id number], 01/15/2010     Influenza high dose,seasonal,PF, 65+ yrs 10/27/2015, 11/07/2016, 01/04/2018, 10/30/2018, 10/18/2019     Influenza, inj, historic,unspecified 10/22/2007, 10/20/2008, 09/15/2009, 09/23/2010, 10/21/2011     Influenza, seasonal,quad inj 6-35 mos 12/10/2012, 09/23/2013, 09/23/2014     Pneumo Conj 13-V (2010&after) 07/28/2015     Pneumo Polysac 23-V 10/20/2008     Td,adult,historic,unspecified 04/12/2004, 01/23/2014     Tdap 01/23/2014     ZOSTER, LIVE 01/01/1900, 06/20/2012       Exam Chest clear to auscultation and percussion.  Heart tones regular rhythm without murmur rub or gallop.  Abdomen soft nontender no organomegaly.  No peritoneal signs.  Extremities free of edema cyanosis or clubbing.  Neck veins nondistended no thyromegaly or scleral icterus noted, carotids full.  Skin warm and dry easily conversant good spirited.  Normal intelligence.  Neurologically intact no  gross localizing findings.  Personal hygiene is poor and his clothes are filthy.  He has had a decline in his personal hygiene and care of himself.  Recheck blood pressure 132/80 pulse 86 respirations 18 O2 sats 99%.    Assessment and Plan  Diabetes mellitus type 2 check A1c blood sugar lipid panel urine for microalbumin today.    Ibuprofen allergy.    Hyperlipidemia on statin therapy no history of MI or stroke.    Intention tremors lifelong.    Possible mild to moderate carpal tunnel syndrome left side with left hand dominant.    Erectile dysfunction using Internet procured purchased sialoliths phosphodiesterase-like compounds.  Cautioned regarding excessive use of same.    Unsafe sexual practices.  Homosexuality.    Time: total time spent with the patient was 25 minutes of which >50% was spent in counseling and coordination of care    The following high BMI interventions were performed this visit: encouragement to exercise    All this diagnosis and assessment plan and plan was discussed in detail with the patient.    Mayito Carlos MD    Patient Active Problem List   Diagnosis     Hiatal Hernia     Lyme Disease     Benign Prostatic Hypertrophy     Type 2 Diabetes Mellitus - Uncomplicated, Controlled     Hyperlipidemia     Adjustment Disorder With Anxiety     Basal Cell Carcinoma Of The Skin     Seizure (H)

## 2021-06-03 VITALS
WEIGHT: 157 LBS | BODY MASS INDEX: 25.23 KG/M2 | HEART RATE: 86 BPM | SYSTOLIC BLOOD PRESSURE: 132 MMHG | DIASTOLIC BLOOD PRESSURE: 80 MMHG | OXYGEN SATURATION: 99 % | HEIGHT: 66 IN

## 2021-06-03 VITALS — BODY MASS INDEX: 25.24 KG/M2 | WEIGHT: 157.04 LBS | HEIGHT: 66 IN

## 2021-06-03 NOTE — TELEPHONE ENCOUNTER
"Pt reports \"terrible throbbing L arm, wrist to shoulder\" since Friday night. Intermittent, worse at night. Pain currently \"9\". Pt reports it interferes with his abilities \"weakness\", not able to write correctly since Friday. Pt reports \"this is the opposite of numb\" \"very painful\".     Advised pt to be seen in the ER now per protocol. Have another adult drive.     Pt verbalizes understanding and agrees to plan.     Reason for Disposition    [1] Age > 40 AND [2] no obvious cause AND [3] pain even when not moving the arm    (Exception: pain is clearly made worse by moving arm or bending neck)    Protocols used: ARM PAIN-A-AH      "

## 2021-06-03 NOTE — TELEPHONE ENCOUNTER
Pain is now worse and 9/10.  Tried tylenol.    No transportation but will call friend to being him to [facility].    Advised him again to go to [facility] and he agrees with plan now.    FYI to PCP.    Lilly Arceo, RN, [facility] Nurse Triage/Med Refills RN

## 2021-06-03 NOTE — PROGRESS NOTES
ASSESSMENT: Wilfrid Ortez is a 78 y.o. male with past medical history significant for Lyme disease, BPH, type 2 diabetes mellitus, hyperlipidemia, adjustment disorder with anxiety, basal cell carcinoma, seizure who presents today for new patient evaluation of a greater than 2-month history of left upper extremity paresthesias with left upper extremity pain which began about 2 weeks ago.  CT cervical spine shows high-grade multilevel foraminal stenosis on the left.  EMG left upper extremity showed moderate left carpal tunnel syndrome.  I suspect that he is symptomatic from both cervical radiculitis and the carpal tunnel syndrome.  He had a positive Spurling sign left.  He had a positive Tinel Sign Left Carpal Tunl. and Left Cubital Tunl.  He was neurologically intact on exam.    NDI: 18  Who 5:16    PLAN:  A shared decision making model was used.  The patient's values and choices were respected.  The following represents what was discussed and decided upon by the physician assistant and the patient.      1.  DIAGNOSTIC TESTS: I reviewed the CT cervical spine.  I also reviewed the EMG left upper extremity.  No further diagnostic tests were ordered.    2.  PHYSICAL THERAPY:  Discussed the importance of core strengthening, ROM, stretching exercises with the patient and how each of these entities is important in decreasing pain.  Explained to the patient that the purpose of physical therapy is to teach the patient a home exercise program.  These exercises need to be performed every day in order to decrease pain and prevent future occurrences of pain.   I entered a referral for the patient begin physical therapy.  I also referred the patient for occupational therapy    3.  MEDICATIONS:    -Patient will continue to gabapentin.  This was prescribed at the emergency department.  He is on 100 mg 3 times daily and titrating his dose up to 300 mg 3 times daily.  We could potentially titrate his dose even higher, depending  "on his response.  -Patient completed a Medrol Dosepak yesterday.  If pain flares back up, we could consider repeating this.  -I did not prescribe an NSAID given comorbidities.  -I did tell the patient that he should use Tylenol 500-1000 mill grams 3 times daily.  -I provided a prescription for a wrist splint to be worn on the left wrist at night.    4.  INTERVENTIONS: No interventions were ordered.  Patient will trial physical therapy and occupational therapy first.  If he fails to improve, we will consider interventional pain management.  - For his radicular pain, recommend a C7-T1 interlaminar epidural steroid injection.  -For carpal tunnel syndrome, we could do a left carpal tunnel injection.    5.  PATIENT EDUCATION: Patient is in agreement the above plan.  All questions were answered.  -I told the patient that if symptoms fail to improve with conservative treatment, I will have him see spine surgery.    6.  FOLLOW-UP:   I will see the patient back in the clinic in about 3 weeks to monitor his progress.  If he has questions or concerns in the meantime, he should not hesitate to call.        SUBJECTIVE:  Wilfrid Ortez  Is a 78 y.o. male who presents today as a referral from the emergency department for new patient evaluation of left upper extremity pain and paresthesias.  Patient states that over 2 months ago he began to experience numbness all the way down his left arm.  [facility]  He had an EMG left upper cavity which showed moderate left carpal tunnel syndrome.  He was referred to [facility].  [facility] did not recommend surgery.  He was told that he would just \"have to live with it.\"  Then about 2 weeks ago he began to experience pain in the arm.  He states that when the pain began it extended from the elbow down the extensor forearm into the hand.  It then spread up into the shoulder, into the shoulder blade, and into the left anterior chest.  The chest " pain he went to the emergency department.  A CT cervical spine was obtained which will be described below.  He was discharged with a prescription for gabapentin and a Medrol Dosepak.  Patient completed a Medrol Dosepak yesterday and felt it was very helpful.  3 today (his first day without steroids) his pain is somewhat worse.  He was referred to our clinic for further evaluation and treatment.    Patient complains of pain involving the left shoulder blade and top of shoulder.  He denies any pain in the neck.  He denies any significant pain in the upper arm today, but it has been there previously.  He has pain about the left elbow which extends toward the left wrist.  He states that he has numbness and tingling in his hand affecting all 5 fingers.  He states that sometimes it feels like fingers 2 and 3 of the most severely affected but sometimes it feels like finger 5 is the most severely affected.  He is left-handed.  He feels some weakness in the left arm which she notices when lifting groceries.  Lifting groceries also aggravates his pain.  His pain is alleviated with sitting in a whirlpool at the [facility] and doing some stretching.  Patient denies any right-sided symptoms.  He denies headaches.  Patient states he has chronic balance problem when he first transitions from seated to standing.  He denies any new trouble with balance.  He denies difficulty with fine motor activities.    Patient has not had any treatments for this.  He has not had physical therapy.  He does not go to a chiropractor.  He has never had any spine surgeries or spine injections.  As mentioned above, he completed a Medrol Dosepak yesterday which was helpful.  He is currently using gabapentin 100 mg 3 times daily and titrating his dose up to 300 mg 3 times daily.  He is not taking any other pain relieving medications.    Current Outpatient Medications on File Prior to Visit   Medication Sig Dispense Refill     aspirin 325 MG tablet Take 325 mg  by mouth daily.       blood glucose test (ACCU-CHEK SMARTVIEW TEST STRIP) strips Test each day as directed. 100 strip 6     blood-glucose meter Misc Use to test once per day 1 each 0     dorzolamide-timolol (COSOPT) 22.3-6.8 mg/mL ophthalmic solution APPLY 1 DROP IN BOTH EYES BID  3     fluticasone propion-salmeterol (ADVAIR) 250-50 mcg/dose DISKUS Inhale 1 puff 2 (two) times a day. 1 each 11     gabapentin (NEURONTIN) 100 MG capsule Take 100 mg by mouth 3 (three) times a day for 7 days, THEN 200 mg 3 (three) times a day for 7 days, THEN 300 mg 3 (three) times a day for 7 days. Follow up with your regular doctor about potentially continuing this medication for your symptoms. 126 capsule 0     lancets (ACCU-CHEK FASTCLIX) Misc Test each day as directed. 30 each 6     latanoprost (XALATAN) 0.005 % ophthalmic solution Administer 1 drop to both eyes at bedtime.       LORazepam (ATIVAN) 0.5 MG tablet TK 1 T PO QD PRF ANXIETY AND 1-2 TS PO QHS  0     methylPREDNISolone (MEDROL DOSEPACK) 4 mg tablet Follow package directions 21 tablet 0     omeprazole (PRILOSEC) 20 MG capsule TAKE 1 CAPSULE(20 MG) BY MOUTH DAILY 90 capsule 3     PARoxetine (PAXIL) 20 MG tablet Take 40 mg by mouth every morning. For a total dose of 50 mg a day.       propranolol (INDERAL) 10 MG tablet Take 10-20 mg by mouth.       simvastatin (ZOCOR) 20 MG tablet TAKE ONE TABLET BY MOUTH EVERY EVENING 90 tablet 3          Allergies   Allergen Reactions     Ibuprofen Nausea Only       Past Medical History:   Diagnosis Date     Anxiety      Blepharospasm syndrome      BPH (benign prostatic hyperplasia)      Cancer (H)      Depression      Diabetes mellitus (H)      Hiatal hernia      Hyperlipidemia      Lyme disease      Panic      Panic attack      Sleep apnea         Patient Active Problem List   Diagnosis     Hiatal Hernia     Lyme Disease     Benign Prostatic Hypertrophy     Type 2 Diabetes Mellitus - Uncomplicated, Controlled     Hyperlipidemia      Adjustment Disorder With Anxiety     Basal Cell Carcinoma Of The Skin     Seizure (H)       Past Surgical History:   Procedure Laterality Date     TRANSURETHRAL RESECTION OF PROSTATE         Family History   Problem Relation Age of Onset     Cancer Mother         throat     Diabetes Mother      Heart disease Father      Surgical history: None    Social history: Patient is single.  He is retired.  He works at the [employer].  He states that he is currently homeless but hoping to get into stable housing within the very near future.  He denies tobacco, alcohol, or illicit drug use.    ROS: Positive for ringing in ears, changes in vision, cough, wheezing, joint pain, muscle pain, itching, imbalance, falls, dizziness, easy bruising, excessive tiredness, anxiety, depression.  Specifically negative for dysphagia, fine motor skill difficulties, bowel/bladder dysfunction, fevers,chills, appetite changes, unexplained weight loss.   Otherwise 13 systems reviewed are negative.  Please see the patient's intake questionnaire from today for details.      OBJECTIVE:  PHYSICAL EXAMINATION:  CONSTITUTIONAL:  Vital signs as above.  No acute distress.  The patient is well nourished and well groomed.  PSYCHIATRIC:  The patient is awake, alert, oriented to person, place, time and answering questions appropriately with clear speech.    HEENT:  Normocephalic, atraumatic.  Sclera clear.    SKIN:  Skin over the face, bilateral upper extremities, and neck is clean, dry, intact without rashes.  LYMPH NODES:  No palpable or tender anterior/posterior cervical, submandibular, or supraclavicular lymph nodes.    MUSCLE STRENGTH:  5/5 strength for the bilateral shoulder abductors, elbow flexors/extensors, wrist extensors, finger flexors/abductors.  NEURO:  CN III-XII are grossly intact.  1+ symmetric biceps, brachioradialis, triceps reflexes bilaterally.  Sensation to light touch is intact over bilateral upper extremities throughout.  Ne gative  Swift's bilaterally.  Positive Tinel Sign Left Cubital Tunl. and Left Carpal Tunl.  Negative Phalen's sign left.  VASCULAR:  2/4 radial pulses bilaterally.  Warm upper limbs bilaterally.  Capillary refill in the upper extremities is less than 1 second.  MUSCULOSKELETAL: Mild tenderness palpation left upper trapezius muscle.  Cervical flexion intact.  Cervical extension mildly restricted.  Lateral rotation mildly restricted bilaterally.  Positive Spurling sign left with reproduction of pain radiating to the left shoulder blade.    RESULTS: I reviewed the CT cervical spine from [facility] dated November 12, 2019.  There is moderate to advanced degenerative change.  There is no high-grade central canal stenosis.  At C2-3 there is moderate to severe left foraminal stenosis.  At C3-4 there is severe bilateral foraminal stenosis.  At C4-5 there is moderately severe bilateral foraminal stenosis.  At C5-6 there is moderate to severe left foraminal stenosis.  At C6-7 there is mild right and moderate to severe left foraminal stenosis.  At C7-T1 there is mild right and moderate to severe left foraminal stenosis.  Please see report for further details.    I reviewed an EMG from [facility] dated September 11, 2019.  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

## 2021-06-03 NOTE — PROGRESS NOTES
OFFICE VISIT NOTE    Subjective:   Chief Complaint:  Follow-up    79-year-old man who is in sharp discomfort from his neck shooting into the left hand.  This started a few days ago.  He has been seen spine clinic and will be starting on some physical therapy next Tuesday.  He was also told to start on gabapentin and he currently is up to 100 mg tablets 3 tablets 3 times daily.  He thinks this is reducing his discomfort.  Second problem is a dry cough.  No fevers or chills.    Current Outpatient Medications   Medication Sig     aspirin 325 MG tablet Take 325 mg by mouth daily.     blood glucose test (ACCU-CHEK SMARTVIEW TEST STRIP) strips Test each day as directed.     blood-glucose meter Misc Use to test once per day     dorzolamide-timolol (COSOPT) 22.3-6.8 mg/mL ophthalmic solution APPLY 1 DROP IN BOTH EYES BID     fluticasone propion-salmeterol (ADVAIR) 250-50 mcg/dose DISKUS Inhale 1 puff 2 (two) times a day.     gabapentin (NEURONTIN) 100 MG capsule Take 100 mg by mouth 3 (three) times a day for 7 days, THEN 200 mg 3 (three) times a day for 7 days, THEN 300 mg 3 (three) times a day for 7 days. Follow up with your regular doctor about potentially continuing this medication for your symptoms.     lancets (ACCU-CHEK FASTCLIX) Misc Test each day as directed.     latanoprost (XALATAN) 0.005 % ophthalmic solution Administer 1 drop to both eyes at bedtime.     LORazepam (ATIVAN) 0.5 MG tablet TK 1 T PO QD PRF ANXIETY AND 1-2 TS PO QHS     methylPREDNISolone (MEDROL DOSEPACK) 4 mg tablet Follow package directions     omeprazole (PRILOSEC) 20 MG capsule TAKE 1 CAPSULE(20 MG) BY MOUTH DAILY     PARoxetine (PAXIL) 20 MG tablet Take 40 mg by mouth every morning. For a total dose of 50 mg a day.     propranolol (INDERAL) 10 MG tablet Take 10-20 mg by mouth.     simvastatin (ZOCOR) 20 MG tablet TAKE ONE TABLET BY MOUTH EVERY EVENING       PSFHx: Tobacco Status:  He  reports that he has never smoked. He has never used  smokeless tobacco.    Review of Systems:  A comprehensive review of systems is negative except for the comments above    Objective:    There were no vitals taken for this visit.  GENERAL: No acute distress.  He is in no distress.  Blood pressure is normal.  Oxygen saturation 99%.  Pulse 88.  Handgrip is slightly decreased in the left side.  Good range of motion of the arms shoulders etc.  Good range of motion of the neck.  When he tilts his head to the left he will have some pain shooting down.  Circulation and sensation are normal.  Gait is normal.  Lungs seem clear without any wheezing.  Heart shows a regular rhythm.    Assessment & Plan   Wilfrid Ortez is a 79 y.o. male.    Cervical radiculopathy.  He seems to be improving with gabapentin.  He will continue taking 300 mg 3 times daily.  If physical therapy helps, he can wean off gabapentin in the future.  He can try over-the-counter Delsym for his cough.  Diagnoses and all orders for this visit:    Cervical radiculopathy    Cough        The following high BMI interventions were performed this visit: encouragement to exercise    Micah Rosas MD  Transcription using voice recognition software, may contain typographical errors.

## 2021-06-04 VITALS
HEIGHT: 66 IN | HEART RATE: 88 BPM | WEIGHT: 161 LBS | OXYGEN SATURATION: 98 % | DIASTOLIC BLOOD PRESSURE: 76 MMHG | BODY MASS INDEX: 25.88 KG/M2 | SYSTOLIC BLOOD PRESSURE: 130 MMHG

## 2021-06-04 VITALS — HEIGHT: 66 IN | BODY MASS INDEX: 25.89 KG/M2 | WEIGHT: 161.1 LBS
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
## 2021-06-04 NOTE — TELEPHONE ENCOUNTER
Spoke with patient via mobile phone and he forgot about his appointment today.  He is in the process of moving to an apartment (has been living with a friend temp.), so not good with his schedule currently.  Reports he will be at his next scheduled PT appointment for 12/13 at 11am.

## 2021-06-04 NOTE — TELEPHONE ENCOUNTER
Left message with patient to call us back at [phone] to update his new address as we will be mailing out a print out of his upcoming PT appointments.

## 2021-06-05 VITALS — WEIGHT: 153 LBS | BODY MASS INDEX: 25.46 KG/M2

## 2021-06-05 VITALS — HEIGHT: 65 IN | BODY MASS INDEX: 25.29 KG/M2

## 2021-06-05 NOTE — PROGRESS NOTES
Office Visit - Follow up    Wilrfid Ortez   79 y.o. male    Date of Visit: 1/20/2020    Chief Complaint   Patient presents with     Diabetes     Hyperlipidemia       Subjective: Diabetes mellitus type 2 with hyperlipidemia and hypertension.    Initial blood pressure 148/78 recheck 126/78.    Denies blood in stool or urine no chest pain shortness of breath medication list reviewed well-tolerated normal effects reconciled.    Allergies ibuprofen nausea.    Non-smoker no excess alcohol.    Colonoscopy dated February 25, 2014 normal.    Patient wished to have refilled gabapentin as is helped left upper extremity pain from cervical radiculopathy down.    ROS: A comprehensive review of systems was performed and was otherwise negative    Medications:  Prior to Admission medications    Medication Sig Start Date End Date Taking? Authorizing Provider   aspirin 325 MG tablet Take 325 mg by mouth daily.   Yes PROVIDER, HISTORICAL   dorzolamide-timolol (COSOPT) 22.3-6.8 mg/mL ophthalmic solution APPLY 1 DROP IN BOTH EYES BID 6/13/18  Yes PROVIDER, HISTORICAL   gabapentin (NEURONTIN) 100 MG capsule 3 capsules p.o. 3 times daily 11/26/19  Yes Micah Rosas MD   latanoprost (XALATAN) 0.005 % ophthalmic solution Administer 1 drop to both eyes at bedtime.   Yes PROVIDER, HISTORICAL   LORazepam (ATIVAN) 0.5 MG tablet TK 1 T PO QD PRF ANXIETY AND 1-2 TS PO QHS 5/17/18  Yes PROVIDER, HISTORICAL   omeprazole (PRILOSEC) 20 MG capsule TAKE 1 CAPSULE(20 MG) BY MOUTH DAILY 8/16/19  Yes Mayito Carlos MD   PARoxetine (PAXIL) 20 MG tablet Take 40 mg by mouth every morning. For a total dose of 50 mg a day.   Yes PROVIDER, HISTORICAL   propranolol (INDERAL) 10 MG tablet Take 10-20 mg by mouth. 6/15/18  Yes PROVIDER, HISTORICAL   simvastatin (ZOCOR) 20 MG tablet TAKE ONE TABLET BY MOUTH EVERY EVENING 4/1/19  Yes Mayito Carlos MD   blood glucose test (ACCU-CHEK SMARTVIEW TEST STRIP) strips Test each day as directed. 2/23/18    Mayito Carlos MD   blood-glucose meter Misc Use to test once per day 2/23/18   Mayito Carlos MD   fluticasone propion-salmeterol (ADVAIR) 250-50 mcg/dose DISKUS Inhale 1 puff 2 (two) times a day. 4/1/19   Mayito Carlos MD   lancets (ACCU-CHEK FASTCLIX) Misc Test each day as directed. 7/28/15   Mayito Carlos MD   methylPREDNISolone (MEDROL DOSEPACK) 4 mg tablet Follow package directions 11/12/19 1/20/20  Mayito Mead MD       Allergies:   Allergies   Allergen Reactions     Ibuprofen Nausea Only       Immunizations:   Immunization History   Administered Date(s) Administered     DT (pediatric) 04/12/2004     Influenza R3v5-46, 01/15/2010     Influenza high dose,seasonal,PF, 65+ yrs 10/27/2015, 11/07/2016, 01/04/2018, 10/30/2018, 10/18/2019     Influenza, inj, historic,unspecified 10/22/2007, 10/20/2008, 09/15/2009, 09/23/2010, 10/21/2011     Influenza, seasonal,quad inj 6-35 mos 12/10/2012, 09/23/2013, 09/23/2014     Pneumo Conj 13-V (2010&after) 07/28/2015     Pneumo Polysac 23-V 10/20/2008     Td,adult,historic,unspecified 04/12/2004, 01/23/2014     Tdap 01/23/2014     ZOSTER, LIVE 01/01/1900, 06/20/2012       Exam Chest clear to auscultation and percussion.  Heart tones regular rhythm without murmur rub or gallop.  Abdomen soft nontender no organomegaly.  No peritoneal signs.  Extremities free of edema cyanosis or clubbing.  Neck veins nondistended no thyromegaly or scleral icterus noted, carotids full.  Skin warm and dry easily conversant good spirited.  Normal intelligence.  Neurologically intact no gross localizing findings.  Cervical radiculopathy helped by gabapentin wants refill done.    Recheck blood pressure 126/78 pulse 66 respirations 18 O2 sats 98%.    BMI 25 weight down 7 pounds.  The patient has poor hygiene is not well kept the patient is not in acute distress not toxic.  He now is living in his own apartment for time he was homeless.    Assessment and  Plan  Diabetes mellitus type 2 check A1c blood sugar lipid panel urine for microalbumin today stable.    Systolic hypertension improved stable 126/78.    Cervical radiculopathy with left upper extremity pain refill gabapentin.  Beneficial.    Hyperlipidemia no history of MI or stroke of late no xanthoma xanthelasma stable.    Ibuprofen allergy.    Time: total time spent with the patient was 25 minutes of which >50% was spent in counseling and coordination of care        Mayito Carlos MD    Patient Active Problem List   Diagnosis     Hiatal Hernia     Lyme Disease     Benign Prostatic Hypertrophy     Type 2 Diabetes Mellitus - Uncomplicated, Controlled     Hyperlipidemia     Adjustment Disorder With Anxiety     Basal Cell Carcinoma Of The Skin     Seizure (H)

## 2021-06-05 NOTE — TELEPHONE ENCOUNTER
Who is requesting the letter?  Patient   Why is the letter needed? Patient stated Mayito Carlos MD has written this letter prior and it is for my life insurance premiums to be waived due to my disability and treatment.  I use to work for the [employer] and they require this letter from my doctor.  How would you like this letter returned? I would like to come pick this up, please call when ready.  Okay to leave a detailed message? Yes

## 2021-06-05 NOTE — TELEPHONE ENCOUNTER
Pt called in ask lab result from today's visit.  Pt notified the result.  Verbalized understand, no other concern at this time.      Karl Turner RN, [facility] Triage/Med Refill 1/20/2020 9:36 PM
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
## 2021-06-07 NOTE — TELEPHONE ENCOUNTER
Routing to refill cue for processing       Requesting it to be filled at   [facility], on [address] [facility] [place]

## 2021-06-07 NOTE — TELEPHONE ENCOUNTER
Refill Request  Did you contact pharmacy: [facility] [place] pharmacy where it was previously filled is closed permanently per patient  Medication name: simvastatin (ZOCOR) 20 MG tablet   Medication   Date: 4/1/2019  Department: [facility] Internal Medicine  Ordering/Authorizing: Mayito Carlos MD    Order Providers     Prescribing Provider  Encounter Provider    Mayito Carlos MD Bozivich, Michael J, MD    Outpatient Medication Detail      Disp  Refills  Start  End     simvastatin (ZOCOR) 20 MG tablet  90 tablet  3  4/1/2019      Sig: TAKE ONE TABLET BY MOUTH EVERY EVENING     Sent to pharmacy as: simvastatin (ZOCOR) 20 MG tablet     E-Prescribing Status: Receipt confirmed by pharmacy (4/1/2019  9:24 AM CDT)     Associated Diagnoses     Type 2 diabetes mellitus with complication, without long-term current use of insulin (H)  - Primary        HLD (hyperlipidemia)        [facility] [place], [place] - [address]        Requested Prescriptions      No prescriptions requested or ordered in this encounter     Who prescribed the medication: Dr Carlos  Requested Pharmacy: [facility] [place]  Is patient out of medication: No.  1 days left  Patient notified refills processed in 3 business days:  Yes, patient had requested a refill a week ago from [facility] & just found out the pharmacy is closed.  Okay to leave a detailed message: yes

## 2021-06-07 NOTE — TELEPHONE ENCOUNTER
Called pt. Explained that his directions say take 3 tablets three times a day so a 3 month supply would by 810 tablets. Pt understands and is okay with rx now.

## 2021-06-07 NOTE — TELEPHONE ENCOUNTER
Refill Approved    Rx renewed per Medication Renewal Policy. Medication was last renewed on 1/20/20.    Lora Barron, [facility] Triage/Med Refill 4/23/2020     Requested Prescriptions   Pending Prescriptions Disp Refills     gabapentin (NEURONTIN) 100 MG capsule [Pharmacy Med Name: GABAPENTIN 100MG CAPSULES] 180 capsule 1     Sig: TAKE 3 CAPSULES BY MOUTH THREE TIMES DAILY       Gabapentin/Levetiracetam/Tiagabine Refill Protocol  Passed - 4/22/2020 10:50 AM        Passed - PCP or prescribing provider visit in past 12 months or next 3 months     Last office visit with prescriber/PCP: 1/20/2020 Mayito Carlos MD OR same dept: 1/20/2020 Mayito Carlos MD OR same specialty: 1/20/2020 Mayito Carlos MD  Last physical: 2/12/2019 Last MTM visit: Visit date not found   Next visit within 3 mo: Visit date not found  Next physical within 3 mo: Visit date not found  Prescriber OR PCP: Mayito Carlos MD  Last diagnosis associated with med order: 1. Cervical radiculopathy  - gabapentin (NEURONTIN) 100 MG capsule [Pharmacy Med Name: GABAPENTIN 100MG CAPSULES]; TAKE 3 CAPSULES BY MOUTH THREE TIMES DAILY  Dispense: 180 capsule; Refill: 1    If protocol passes may refill for 12 months if within 3 months of last provider visit (or a total of 15 months).

## 2021-06-07 NOTE — TELEPHONE ENCOUNTER
Who is calling:  The patient    Reason for Call:  The patient states he picked up the Gabapentin on 4/24/20 and was given # 810 capsules of the Gabapentin medication. The patient wanted to let Mayito Carlos MD and his team aware of this mistake. This writer informed the [facility] to clarify the amount to dispense.  Date of last appointment with primary care:   Okay to leave a detailed message: Yes

## 2021-06-09 ENCOUNTER — RECORDS - HEALTHEAST (OUTPATIENT)
Dept: ADMINISTRATIVE | Facility: OTHER | Age: 81
End: 2021-06-09

## 2021-06-09 NOTE — TELEPHONE ENCOUNTER
New Appointment Needed  What is the reason for the visit:    Patient would like to be scheduled for a lab appointment on 7/20/20 before their telephone visit with their provider. No open appointments for lab available at the time of call.  Provider Preference: Lab  How soon do you need to be seen?: 7/20/20  Waitlist offered?: No  Okay to leave a detailed message:  Yes

## 2021-06-09 NOTE — TELEPHONE ENCOUNTER
FYI - Status Update  Who is Calling: Patient  Update: Patient was checking the status of his request below. The patient was informed of Mayito Carlos MD's message below.    Patient wanted to schedule a lab appointment. Patient was transferred to scheduling.  Okay to leave a detailed message?:  No return call needed

## 2021-06-09 NOTE — TELEPHONE ENCOUNTER
Who is calling:  Patient   Reason for Call:  Caller had questions on regards to 07/20 appointment if he is needing to come in before the appointment for labs or will that be after the appointment. Caller also has question on cortisone injections and if its okay getting it while on Aspirin?   Date of last appointment with primary care:   Okay to leave a detailed message: Yes

## 2021-06-09 NOTE — TELEPHONE ENCOUNTER
Okay for cortisone injections while on aspirin.    We will order the lab test after the next visit.  Please call patient for me.

## 2021-06-09 NOTE — PROGRESS NOTES
Office Visit - Follow up    Wilfrid Ortez   76 y.o. male    Date of Visit: 3/28/2017    Chief Complaint   Patient presents with     Cough     follow up- much better     Back Pain     low started last Thursday     Diabetes     fasting       Subjective: Beatties mellitus type II.    Cough follow-up much better.  Treated for asthmatic bronchitis.    Low back pain since last Thursday he seems to be improving last laboratory assessment for diabetes done December 16, 2016.    Diabetes mellitus denies any polyuria or polydipsia no symptoms of peripheral neuropathy.    Medication list reviewed generally well-tolerated.  No blood in stool or urine history of BPH as well as adjustment disorder with anxiety and phobias the latter are improved with the treatment inclusive of Paxil as well as lorazepam cautioned regarding excessive use of same.    ROS: A comprehensive review of systems was performed and was otherwise negative    Medications:  Prior to Admission medications    Medication Sig Start Date End Date Taking? Authorizing Provider   aspirin 325 MG tablet Take 325 mg by mouth daily.   Yes PROVIDER, HISTORICAL   latanoprost (XALATAN) 0.005 % ophthalmic solution USE 1 GTT IN OU ONCE IN THE EVENIG 3/24/16  Yes PROVIDER, HISTORICAL   LORAZEPAM ORAL Take 0.5 mg by mouth every evening.    Yes PROVIDER, HISTORICAL   omeprazole (PRILOSEC) 20 MG capsule Take 1 capsule (20 mg total) by mouth daily. 1/27/17  Yes Mayito Carlos MD   PARoxetine (PAXIL) 20 MG tablet Take 20 mg by mouth bedtime.   Yes PROVIDER, HISTORICAL   simvastatin (ZOCOR) 20 MG tablet TAKE ONE TABLET BY MOUTH EVERY EVENING (PT ANN [address]-CZ) 10/7/16  Yes Mayito Carlos MD   blood glucose test (ACCU-CHEK SMARTVIEW TEST STRIP) strips Test each day as directed. 5/3/16   Mayito Carlos MD   lancets (ACCU-CHEK FASTCLIX) Misc Test each day as directed. 7/28/15   Mayito Carlos MD   codeine-guaiFENesin (GUAIFENESIN AC)  mg/5 mL liquid  Take 5 mL by mouth 2 (two) times a day as needed for cough. 3/13/17 3/28/17  Mayito Carlos MD   VIRTUSSIN AC  mg/5 mL liquid TK 1 TEASPOON PO QID PRN 12/5/16 3/28/17  PROVIDER, HISTORICAL       Allergies:   Allergies   Allergen Reactions     Ibuprofen Nausea Only       Immunizations:   Immunization History   Administered Date(s) Administered     DT (pediatric) 04/12/2004     Influenza [id number], 01/15/2010     Influenza high dose, seasonal 10/27/2015, 11/07/2016     Influenza, inj, historic 10/22/2007, 10/20/2008, 09/15/2009, 09/23/2010, 10/21/2011     Influenza, seasonal,quad inj 6-35 mos 12/10/2012, 09/23/2013, 09/23/2014     Pneumo Conj 13-V (2010&after) 07/28/2015     Pneumo Polysac 23-V 10/20/2008     Td, historic 04/12/2004, 01/23/2014     Tdap 01/23/2014     ZOSTER 01/01/1900, 06/20/2012       Exam Chest clear to auscultation and percussion.  Heart tones regular rhythm without murmur rub or gallop.  Abdomen soft nontender no organomegaly.  No peritoneal signs.  Extremities free of edema cyanosis or clubbing.  Neck veins nondistended no thyromegaly or scleral icterus noted, carotids full.  Skin warm and dry easily conversant good spirited.  Normal intelligence.  Neurologically intact no gross localizing findings.  Hygiene questionable.    Assessment and Plan  Diabetes mellitus type 2 check A1c plus lipid panel urine for microalbumin and blood sugar today.    Anxiety with phobic reactions.  Improved with lorazepam as well as Paxil therapy.    Overweight BMI 27 discussed see below.    Low back pain suggest Jordan exercises plus extra strength Tylenol on a scheduled basis 1000 mg 3 times a day.    Asthmatic bronchitis improved in follow-up.    Time: total time spent with the patient was 25 minutes of which >50% was spent in counseling and coordination of care    The following high BMI interventions were performed this visit: encouragement to exercise    Mayito Carlos MD    Patient Active Problem  List   Diagnosis     Hiatal Hernia     Lyme Disease     Benign Prostatic Hypertrophy     Type 2 Diabetes Mellitus - Uncomplicated, Controlled     Hyperlipidemia     Adjustment Disorder With Anxiety     Basal Cell Carcinoma Of The Skin

## 2021-06-09 NOTE — TELEPHONE ENCOUNTER
Refill Request  Did you contact pharmacy: Yes  Medication name:   Requested Prescriptions     Pending Prescriptions Disp Refills     generic lancets (ACCU-CHEK FASTCLIX LANCING DEV) 30 each 6     Sig: Test each day as directed.     blood glucose test (ACCU-CHEK SMARTVIEW TEST STRIP) strips 100 strip 6     Sig: Test each day as directed.     Who prescribed the medication: Mayito Carlos MD  Requested Pharmacy: [facility]  Is patient out of medication: Yes  Patient notified refills processed in 3 business days:  yes  Okay to leave a detailed message: yes

## 2021-06-09 NOTE — PROGRESS NOTES
Office Visit - Follow up    Wilfrid Ortez   76 y.o. male    Date of Visit: 3/13/2017    Chief Complaint   Patient presents with     Cough     Diabetes       Subjective: Cough.  Diabetic foot examination done allCLEAR.    Diabetic labs done December 16, 2016 all clear.    Diabetic eye examination done 10/27/2016 all clear.    Sex partners been positive for gonorrhea.    Patient has symptoms of cough with fever chills no sore throat or perianal discomfort no genitourinary symptoms or penile drip or discharge.    Medication list reviewed generally well-tolerated.  Medication list without significant allergies except ibuprofen.    No blood in stool or urine or sputum.  No chest pain per se.  No shortness of breath.    ROS: A comprehensive review of systems was performed and was otherwise negative    Medications:  Prior to Admission medications    Medication Sig Start Date End Date Taking? Authorizing Provider   aspirin 325 MG tablet Take 325 mg by mouth daily.   Yes PROVIDER, HISTORICAL   latanoprost (XALATAN) 0.005 % ophthalmic solution USE 1 GTT IN OU ONCE IN THE EVENIG 3/24/16  Yes PROVIDER, HISTORICAL   LORAZEPAM ORAL Take 0.5 mg by mouth every evening.    Yes PROVIDER, HISTORICAL   omeprazole (PRILOSEC) 20 MG capsule Take 1 capsule (20 mg total) by mouth daily. 1/27/17  Yes Mayito Carlos MD   PARoxetine (PAXIL) 20 MG tablet Take 20 mg by mouth bedtime.   Yes PROVIDER, HISTORICAL   azithromycin (ZITHROMAX Z-BECKY) 250 MG tablet Take 2 tablets (500 mg) on  Day 1,  followed by 1 tablet (250 mg) once daily on Days 2 through 5. 3/13/17 3/18/17  Mayito Carlos MD   blood glucose test (ACCU-CHEK SMARTVIEW TEST STRIP) strips Test each day as directed. 5/3/16   Mayito Carlos MD   codeine-guaiFENesin (GUAIFENESIN AC)  mg/5 mL liquid Take 5 mL by mouth 2 (two) times a day as needed for cough. 3/13/17 3/13/18  Mayito Carlos MD   lancets (ACCU-CHEK FASTCLIX) Misc Test each day as directed.  7/28/15   Mayito Carlos MD   simvastatin (ZOCOR) 20 MG tablet TAKE ONE TABLET BY MOUTH EVERY EVENING (PT ANN [address]-RL) 10/7/16   Mayito Carlos MD   VIRTUSSIN AC  mg/5 mL liquid TK 1 TEASPOON PO QID PRN 12/5/16   PROVIDER, HISTORICAL       Allergies:   Allergies   Allergen Reactions     Ibuprofen Nausea Only       Immunizations:   Immunization History   Administered Date(s) Administered     DT (pediatric) 04/12/2004     Influenza [id number], 01/15/2010     Influenza high dose, seasonal 10/27/2015, 11/07/2016     Influenza, inj, historic 10/22/2007, 10/20/2008, 09/15/2009, 09/23/2010, 10/21/2011     Influenza, seasonal,quad inj 6-35 mos 12/10/2012, 09/23/2013, 09/23/2014     Pneumo Conj 13-V (2010&after) 07/28/2015     Pneumo Polysac 23-V 10/20/2008     Td, historic 04/12/2004, 01/23/2014     Tdap 01/23/2014     ZOSTER 01/01/1900, 06/20/2012       Exam Chest clear to auscultation and percussion.  Heart tones regular rhythm without murmur rub or gallop.  Abdomen soft nontender no organomegaly.  No peritoneal signs.  Extremities free of edema cyanosis or clubbing.  Neck veins nondistended no thyromegaly or scleral icterus noted, carotids full.  Skin warm and dry easily conversant good spirited.  Normal intelligence.  Neurologically intact no gross localizing findings.  Scattered sibilant rales and rhonchi.  Not in acute distress slight pallor noted O2 sats 95% heart rate 120 regular.    Assessment and Plan  Pneumonia check chest x-ray and EKG.    Tachycardia regular.    Ibuprofen allergy.    Colonoscopy normal February 25, 2014 Dr. Bonnie Schneider presiding.    Anxiety with history of phobic reactions.    Hyperlipidemia on statin therapy no history of MI or stroke.  Plan Z-Evaristo chest x-ray EKG plus Cheratussin with codeine cough syrup refill.    Time: total time spent with the patient was 40 minutes of which >50% was spent in counseling and coordination of care    The following high BMI interventions  were performed this visit: encouragement to exercise return to clinic 2 weeks for fasting office visit with labs    Mayito Carlos MD    Patient Active Problem List   Diagnosis     Hiatal Hernia     Lyme Disease     Benign Prostatic Hypertrophy     Type 2 Diabetes Mellitus - Uncomplicated, Controlled     Hyperlipidemia     Adjustment Disorder With Anxiety     Basal Cell Carcinoma Of The Skin

## 2021-06-09 NOTE — TELEPHONE ENCOUNTER
Spoke with the patient and relayed message below from Dr. Carlos.  He verbalized understanding.  We have scheduled his lab appointment for Tuesday, 7/21/2020.  Patient had no further questions at this time.  Malena DILALO CMA/MAE....................1:25 PM

## 2021-06-09 NOTE — TELEPHONE ENCOUNTER
Dr. Carlos,  I don't see any lab orders for the patient.  Would you like to talk with him before ordering labs?  Malena DIALLO, CMA/CMT....................12:22 PM

## 2021-06-09 NOTE — PROGRESS NOTES
"Wilfrid Ortez is a 79 y.o. male who is being evaluated via a billable telephone visit.      The patient has been notified of following:     \"This telephone visit will be conducted via a call between you and your physician/provider. We have found that certain health care needs can be provided without the need for a physical exam.  This service lets us provide the care you need with a short phone conversation.  If a prescription is necessary we can send it directly to your pharmacy.  If lab work is needed we can place an order for that and you can then stop by our lab to have the test done at a later time.    Telephone visits are billed at different rates depending on your insurance coverage. During this emergency period, for some insurers they may be billed the same as an in-person visit.  Please reach out to your insurance provider with any questions.    If during the course of the call the physician/provider feels a telephone visit is not appropriate, you will not be charged for this service.\"    Patient has given verbal consent to a Telephone visit? Yes    What phone number would you like to be contacted at? [phone]    Patient would like to receive their AVS by AVS Preference: Mail a copy.    Additional provider notes: Beatties mellitus type II.    Pain in the right hip with weakness feels like it is going to go out.  Steroid injection is planned today through interventional radiology group at [facility].  Working diagnosis is bursitis of right hip although osteoarthritis of the right hip requiring surgery and hip replacement may be in the office.    The patient had exposure perhaps to a nephew with coronavirus 19 COVID-19 in March 2020 and is interested in having laboratory test done to see if he is developed immunity.  COVID-19 serology is pending for tomorrow.  Along with his other diabetic labs to include blood sugar A1c lipid panel and urine for " microalbumin.    He has not checked his blood sugar lately but when he did check it about 2 weeks ago it was 150.  He has no symptoms of polyuria polydipsia or peripheral neuropathy.    Denies blood in stool or urine no chest pain or shortness of breath.    Followed by orthopedic surgery group at [facility] regarding right hip discomfort and a sensation of it going out.  Medicines reviewed reconciled in the chart.    Assessment/Plan:  Diabetes mellitus type 2 stable glucometer reordered.  Blood sugar A1c lipid panel urine for microalbumin is planned for tomorrow.    Exposure to COVID-19 March 2020.  COVID serology pending.    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  Steroid injection for bursitis plan today but may require total hip arthroplasty hip.  Followed by [facility] group.      Phone call duration:  11 minutes    Bonnie Vicente [facility]

## 2021-06-09 NOTE — TELEPHONE ENCOUNTER
RN cannot approve Refill Request    RN can NOT refill this medication Protocol failed and NO refill given.     Lora Barron, [facility] Triage/Med Refill 7/21/2020    Requested Prescriptions   Pending Prescriptions Disp Refills     generic lancets (ACCU-CHEK FASTCLIX LANCING DEV) 30 each 6     Sig: Test each day as directed.       Diabetic Supplies Refill Protocol Failed - 7/21/2020 10:23 AM        Failed - Visit with PCP or prescribing provider visit in last 6 months     Last office visit with prescriber/PCP: 1/20/2020 Mayito Carlos MD OR same dept: 1/20/2020 Mayito Carlos MD OR same specialty: 1/20/2020 Mayito Carlos MD  Last physical: 2/12/2019 Last MTM visit: Visit date not found   Next visit within 3 mo: Visit date not found  Next physical within 3 mo: Visit date not found  Prescriber OR PCP: Mayito Carlos MD  Last diagnosis associated with med order: 1. Diabetes mellitus without complication (H)  - generic lancets (ACCU-CHEK FASTCLIX LANCING DEV); Test each day as directed.  Dispense: 30 each; Refill: 6    2. Type 2 diabetes mellitus (H)  - blood glucose test (ACCU-CHEK SMARTVIEW TEST STRIP) strips; Test each day as directed.  Dispense: 100 strip; Refill: 6    If protocol passes may refill for 12 months if within 3 months of last provider visit (or a total of 15 months).             Passed - A1C in last 6 months     Hemoglobin A1c   Date Value Ref Range Status   07/21/2020 6.5 (H) 3.5 - 6.0 % Final                  blood glucose test (ACCU-CHEK SMARTVIEW TEST STRIP) strips 100 strip 6     Sig: Test each day as directed.       Diabetic Supplies Refill Protocol Failed - 7/21/2020 10:23 AM        Failed - Visit with PCP or prescribing provider visit in last 6 months     Last office visit with prescriber/PCP: 1/20/2020 Mayito Carlos MD OR same dept: 1/20/2020 Mayito Carlos MD OR same specialty: 1/20/2020 Mayito Carlos MD  Last physical: 2/12/2019 Last MTM  visit: Visit date not found   Next visit within 3 mo: Visit date not found  Next physical within 3 mo: Visit date not found  Prescriber OR PCP: Mayito Carlos MD  Last diagnosis associated with med order: 1. Diabetes mellitus without complication (H)  - generic lancets (ACCU-CHEK FASTCLIX LANCING DEV); Test each day as directed.  Dispense: 30 each; Refill: 6    2. Type 2 diabetes mellitus (H)  - blood glucose test (ACCU-CHEK SMARTVIEW TEST STRIP) strips; Test each day as directed.  Dispense: 100 strip; Refill: 6    If protocol passes may refill for 12 months if within 3 months of last provider visit (or a total of 15 months).             Passed - A1C in last 6 months     Hemoglobin A1c   Date Value Ref Range Status   07/21/2020 6.5 (H) 3.5 - 6.0 % Final

## 2021-06-10 NOTE — PROGRESS NOTES
MTM Initial Encounter  Assessment & Plan                                                     1. Type 2 diabetes mellitus   Well controlled as evidenced by recent A1c of 6.5%, lifestyle controlled without medication. No microalbuminria. Historically BP has been well controlled without medication. Continue to monitor.   Could consider reducing dose of aspirin to decrease bleeding risk, but unsure if the syncope episode he described was a TIA(?), at which point the dose was increased. No s/sx bleeding. Will defer to PCP.     2. Hyperlipidemia  Appropriately on a moderate intensity statin given age and diabetes diagnosis per ACC/AHA guidelines.     3. Adjustment disorder with anxiety/Tremor  Managed by psychiatry/neurology. Well controlled with last PHQ-9 score of 4. Discussed risk vs benefit of long term benzodiazepine use and he prefers to continue.   Given that he is still having bothersome symptoms of tremor and he is on low dose propranolol, recommended he contact neurology to discuss possible dose increase.   Plan   1. Consider dose increase in propranolol.     4. Cervical radiculopathy  Pain well controlled with gabapentin, although question if daytime sedation is a side effect. Recommend trying lower morning dose to see if fatigue improves, while hopefully maintaining efficacy for pain.   Plan   1. Try reducing morning gabapentin dose to 100-200 mg and continuing 300 mg at bedtime.      5. Hiatal hernia/GERD  Well controlled with PPI.       Follow Up  PRN with MTM    Subjective & Objective                                                     Wilfrid GRACIE Ortez is a 79 y.o. male called for an initial visit for Medication Therapy Management. He was referred to me from  Part D program    Patient consented to a telehealth visit: Yes  Chief Complaint: Medication review  Medication Adherence/Access: Good; no issues identified. Uses a pillbox.     1. Type 2 diabetes mellitus   Murali is not on any medication for  "diabetes. He says he manages this with his diet and exercise. When he gave up pop he noticed BG dropping dramatically. He does check his BG periodically. Last week, BG was 144 mg/dl.   He is on aspirin 325 mg daily. His father  of a heart attack and did have rheumatic fever as a child. Personally, patient does not have heart disease. He was taking 81 mg of aspirin in the past, but he had an \"episode of passing out\" and the dose was increased. Has not happened since. No s/sx bleeding.     Lab Results   Component Value Date    HGBA1C 6.5 (H) 2020    HGBA1C 6.7 (H) 2020    HGBA1C 6.9 (H) 10/18/2019     Lab Results   Component Value Date    MICROALBUR 1.06 2020    LDLCALC 113 2020    CREATININE 1.05 2019       2. Hyperlipidemia  Murali is taking simvastatin 20 mg daily in the evening. No adverse effects noted.     The 10-year ASCVD risk score (Greene SADIA Jr., et al., 2013) is: 59.4%    Values used to calculate the score:      Age: 79 years      Sex: Male      Is Non- : No      Diabetic: Yes      Tobacco smoker: No      Systolic Blood Pressure: 160 mmHg      Is BP treated: No      HDL Cholesterol: 51 mg/dL      Total Cholesterol: 182 mg/dL    3. Adjustment disorder with anxiety/Tremor  Murali is taking paroxetine 40 mg in the morning and 10 mg at night. He's been on this for about 15-20 years. He was hospitalized for a panic attack at the time it was started. He used to take just one in the morning, but would feel nervous during the day, so increased to two and feels better. He takes lorazepam 0.5 mg at bedtime. He also may use it for panic attacks PRN, but this is rare, perhaps once or twice a month. He see a psychiatrist who prescribes these medications.   He takes propranolol 10 mg two times a day for tremor. Initially was prescribed for anxiety PRN but now taking for tremor. Still having tremor symptoms. Prescribed by neurologist.     PHQ-9 Total Score: 4 (2020  " 8:14 AM)    4. Cervical radiculopathy  Murali is taking gabapentin 300 mg two times a day. The Rx is written for three times a day, but he often too busy for the afternoon dose and he feels twice a day is sufficient. Notes no pain. Typically has pain from his shoulder down his hand from a pinched nerve. He does note being tired during the day. He cannot take ibuprofen due to nausea.     5. Hiatal hernia/GERD  Murali is taking omeprazole 20 mg daily in the evening. This has been effective.       PMH: reviewed in EPIC   Allergies/ADRs: reviewed in EPIC   Alcohol: no   Tobacco:   Social History     Tobacco Use   Smoking Status Never Smoker   Smokeless Tobacco Never Used     Recent Vitals:   BP Readings from Last 3 Encounters:   02/19/20 160/77   01/20/20 126/78   11/26/19 130/76     Pulse Readings from Last 3 Encounters:   02/19/20 76   01/20/20 66   11/26/19 88     Wt Readings from Last 3 Encounters:   02/19/20 150 lb (68 kg)   01/20/20 154 lb (69.9 kg)   11/26/19 161 lb (73 kg)       ----------------    The patient was given a CMS standardized format medication action plan    I spent 45 minutes with this patient today. An extra 15 minutes was spent creating the Medication Action Plan. All changes were made via collaborative practice agreement with Mayito Carlos MD. A copy of the visit note was provided to the patient's provider.     Janelle Fournier, PharmD, BCACP  Medication Management (MTM) Pharmacist  [facility]     Telemedicine Visit Details    Type of service:  Telephone     Start Time: 10:00 AM  End Time: 10:45 AM    Originating Location (pt. Location): Home    Distant Location (provider location):  [facility] MEDICATION THERAPY MANAGEMENT [facility]    Mode of Communication: Telephone    Current Outpatient Medications   Medication Sig Dispense Refill     gabapentin (NEURONTIN) 100 MG capsule Take 300 mg by mouth 2 (two) times a day.       aspirin 325 MG tablet  Take 325 mg by mouth daily.       blood glucose test strips Use 1 each As Directed as needed. Dispense brand per patient's insurance at pharmacy discretion. 100 strip 11     dorzolamide-timolol (COSOPT) 22.3-6.8 mg/mL ophthalmic solution Administer 1 drop to both eyes 2 (two) times a day.  3     generic lancets (ACCU-CHEK FASTCLIX LANCING DEV) Test each day as directed. 30 each 6     latanoprost (XALATAN) 0.005 % ophthalmic solution Administer 1 drop to both eyes at bedtime.       LORazepam (ATIVAN) 0.5 MG tablet Take 0.5 mg by mouth at bedtime.  0     omeprazole (PRILOSEC) 20 MG capsule TAKE 1 CAPSULE(20 MG) BY MOUTH DAILY 90 capsule 3     PARoxetine (PAXIL) 20 MG tablet Take 40 mg by mouth every morning. And 1/2 tablet (10 mg) at night. For a total dose of 50 mg a day.       propranolol (INDERAL) 10 MG tablet Take 10 mg by mouth 2 (two) times a day.       simvastatin (ZOCOR) 20 MG tablet TAKE ONE TABLET BY MOUTH EVERY EVENING 90 tablet 3     No current facility-administered medications for this visit.

## 2021-06-11 NOTE — TELEPHONE ENCOUNTER
"78 y/o male calls about a previous cortisone in his hip about a couple months ago, injection did helpd with Left leg and ankle pain, but now pain in ankle and up knee to knee cap on RIGHT leg is bothering him, rates pain 8 out of 10 for pain. RIGHT leg not hot , not red, but painful to the touch, no swelling, no temp change in comparison to left, no neuro deficits noted.  He has not tried any NSAIDs, advised Tylenol now and then evaluate pain over the next 2 hours, if same or worse then he should be seen in the next 4 hours, otherwise can make an appointment with Orthopedics for reevaluation.     Janelle De La Fuente RN Triage Nurse/[facility] Connections  9/29/20  1:07AM      Reason for Disposition    [1] MODERATE pain (e.g., interferes with normal activities, limping) AND [2] present > 3 days    Additional Information    Negative: Sounds like a life-threatening emergency to the triager    Negative: [1] Swollen joint AND [2] fever    Negative: [1] Red area or streak AND [2] fever    Negative: Patient sounds very sick or weak to the triager    Negative: [1] Can't move swollen joint at all AND [2] no fever    Negative: [1] Thigh or calf pain AND [2] only 1 side AND [3] present > 1 hour    Negative: [1] Thigh, calf, or ankle swelling AND [2] only 1 side    Negative: [1] Looks infected (spreading redness, pus) AND [2] large red area (> 2 in. or 5 cm)    Negative: [1] SEVERE pain (e.g., excruciating, unable to walk) AND [2] not improved after 2 hours of pain medicine    Negative: [1] Very swollen joint AND [2] no fever    Negative: Blistering rash in area of pain  (i.e., dermatomal distribution or \"band\" or \"stripe\")    Negative: Looks like a boil, infected sore, or deep ulcer    Negative: [1] Redness of the skin AND [2] no fever    Protocols used: KNEE PAIN-A-AH    COVID 19 Nurse Triage Plan/Patient Instructions    Please be aware that novel coronavirus (COVID-19) may be circulating in the community. If you develop symptoms such as " fever, cough, or SOB or if you have concerns about the presence of another infection including coronavirus (COVID-19), please contact your health care provider or visit [email or web address].     Disposition/Instructions    Home care recommended. Follow home care protocol based instructions.    Thank you for taking steps to prevent the spread of this virus.  o Limit your contact with others.  o Wear a simple mask to cover your cough.  o Wash your hands well and often.    Resources    [facility]: About COVID-19: [email or web address]    CDC: What to Do If You're Sick: www.cdc.gov/coronavirus/2019-ncov/about/steps-when-sick.html    CDC: Ending Home Isolation: www.cdc.gov/coronavirus/2019-ncov/hcp/disposition-in-home-patients.html     CDC: Caring for Someone: www.cdc.gov/coronavirus/2019-ncov/if-you-are-sick/care-for-someone.html     [facility]: Interim Guidance for Hospital Discharge to Home: www.[facility]./diseases/coronavirus/hcp/hospdischarge.pdf    [facility] clinical trials (COVID-19 research studies): clinicalaffairs.[facility]-clinical-trials     Below are the COVID-19 hotlines at the [place] [facility] ([facility]). Interpreters are available.   o For health questions: Call 429-406-9029 or 1-864.202.6721 (7 a.m. to 7 p.m.)  o For questions about schools and childcare: Call 755-024-9547 or 1-822.843.4033 (7 a.m. to 7 p.m.)

## 2021-06-11 NOTE — PROGRESS NOTES
Office Visit - Follow up    Wilfrid Ortez   76 y.o. male    Date of Visit: 6/29/2017    Chief Complaint   Patient presents with     Diabetes     Follow-up     ER- heart palpitation     Memory Loss     memory concerns       Subjective: Diabetes mellitus type 2 fasting.    Emergency room follow-up for her palpitations related to anxiety prior history of panic attacks doing well since.  Labs from March 29, 2017 reviewed.  The patient has memory concerns he would like a note seeking get a dog done.    No blood in stool or urine no chest pain shortness of breath.  No current palpitations medication list reviewed generally well-tolerated.    ROS: A comprehensive review of systems was performed and was otherwise negative    Medications:  Prior to Admission medications    Medication Sig Start Date End Date Taking? Authorizing Provider   aspirin 325 MG tablet Take 325 mg by mouth daily.   Yes PROVIDER, HISTORICAL   latanoprost (XALATAN) 0.005 % ophthalmic solution Administer 1 drop to both eyes at bedtime.   Yes PROVIDER, HISTORICAL   LORAZEPAM ORAL Take 1 mg by mouth every evening.    Yes PROVIDER, HISTORICAL   omeprazole (PRILOSEC) 20 MG capsule Take 1 capsule (20 mg total) by mouth daily.  Patient taking differently: Take 20 mg by mouth every evening.  1/27/17  Yes Mayito Carlos MD   PARoxetine (PAXIL) 20 MG tablet Take 30 mg by mouth every morning. For a total dose of 50 mg a day.   Yes PROVIDER, HISTORICAL   PARoxetine (PAXIL) 20 MG tablet Take 20 mg by mouth every evening. For a total dose of 50 mg a day.   Yes PROVIDER, HISTORICAL   simvastatin (ZOCOR) 20 MG tablet TAKE ONE TABLET BY MOUTH EVERY EVENING ([facility] [id number]) 6/16/17  Yes Mayito Carlos MD   timolol maleate (TIMOPTIC) 0.5 % ophthalmic solution Administer 1 drop to both eyes daily.   Yes PROVIDER, HISTORICAL   simvastatin (ZOCOR) 20 MG tablet Take 20 mg by mouth at bedtime.  6/29/17 Yes PROVIDER, HISTORICAL   blood glucose test  (ACCU-CHEK SMARTVIEW TEST STRIP) strips Test each day as directed. 5/3/16   Mayito Carlos MD   lancets (ACCU-CHEK FASTCLIX) Misc Test each day as directed. 7/28/15   Mayito Carlos MD       Allergies:   Allergies   Allergen Reactions     Ibuprofen Nausea Only       Immunizations:   Immunization History   Administered Date(s) Administered     DT (pediatric) 04/12/2004     Influenza [id number], 01/15/2010     Influenza high dose, seasonal 10/27/2015, 11/07/2016     Influenza, inj, historic 10/22/2007, 10/20/2008, 09/15/2009, 09/23/2010, 10/21/2011     Influenza, seasonal,quad inj 6-35 mos 12/10/2012, 09/23/2013, 09/23/2014     Pneumo Conj 13-V (2010&after) 07/28/2015     Pneumo Polysac 23-V 10/20/2008     Td, historic 04/12/2004, 01/23/2014     Tdap 01/23/2014     ZOSTER 01/01/1900, 06/20/2012       Exam Chest clear to auscultation and percussion.  Heart tones regular rhythm without murmur rub or gallop.  Abdomen soft nontender no organomegaly.  No peritoneal signs.  Extremities free of edema cyanosis or clubbing.  Neck veins nondistended no thyromegaly or scleral icterus noted, carotids full.  Skin warm and dry easily conversant good spirited.  Normal intelligence.  Neurologically intact no gross localizing findings.    Assessment and Plan  Diabetes mellitus type 2 check A1c plus blood sugar urine for microalbumin lipid panel today.    Heart palpitations subsequently subsided.    Memory concerns.    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    Off simvastatin for hyperlipidemia addendum colonoscopy normal February 25, 2014 with Dr. [facility]nie [facility].  RTC 3 months.    Time: total time spent with the patient was 25 minutes of which >50% was spent in counseling and coordination of care    The following high BMI interventions were performed this visit: encouragement to exercise    Mayito Carlos MD    Patient Active Problem List   Diagnosis      Hiatal Hernia     Lyme Disease     Benign Prostatic Hypertrophy     Type 2 Diabetes Mellitus - Uncomplicated, Controlled     Hyperlipidemia     Adjustment Disorder With Anxiety     Basal Cell Carcinoma Of The Skin

## 2021-06-12 NOTE — TELEPHONE ENCOUNTER
Medication Question or Clarification  Who is calling: Patient   What medication are you calling about (include dose and sig)?: gabapentin  gabapentin (NEURONTIN) 100 MG capsule  TAKE 3 CAPSULES BY MOUTH THREE TIMES DAILY, Normal   Dispense: 810 capsule   Refills: 2 ordered   Pharmacy: [id number] [address] AT [facility] [address] (Ph: [phone])     gabapentin (NEURONTIN) 100 MG capsule        Sig - Route: Take 300 mg by mouth 2 (two) times a day. - Oral    Class: Historical Med          Who prescribed the medication?: Mayito Carlos MD  What is your question/concern?: this Rx was sent in for 800 tablets prior.  This Rx was not the correct amount.  The pharmacist corrected this in the chart and patient is requesting this to be refilled.   Requested Pharmacy: [facility]  Okay to leave a detailed message?: Yes

## 2021-06-13 NOTE — PROGRESS NOTES
Office Visit - Follow up    Wilfrid Ortez   76 y.o. male    Date of Visit: 10/3/2017    Chief Complaint   Patient presents with     Diabetes     fasting     Hyperlipidemia     Tingling     lower left arm - heavy at times       Subjective: Diabetes mellitus type 2 with hyperlipidemia.  Morning blood sugar this morning 121 mg percent.    76-year-old male notes tingling in the left arm between the elbow and the wrist especially at night not exertionally related not associated with any chest pain.  Left lower arm feels heavy at times.  For tremor he has been seen by neurologist.  Dr. Gaines.  Last labs were done June 29, 2017.  No associated chest pain shortness of breath the left arm heaviness and numbness does not feel exertionally related.  It comes on at rest especially at bedtime.    No blood in urine occasional blood in stool colonoscopy last done on February 25, 2014 normal with Dr. Schneider there is a family history of colon polyps and I did advise reconsultation with Dr. Schneider now for evaluation of minimal blood noted on the outside of the stool.    Medication list reviewed well-tolerated normal effects.    ROS: A comprehensive review of systems was performed and was otherwise negative    Medications:  Prior to Admission medications    Medication Sig Start Date End Date Taking? Authorizing Provider   aspirin 325 MG tablet Take 325 mg by mouth daily.   Yes PROVIDER, HISTORICAL   blood glucose test (ACCU-CHEK SMARTVIEW TEST STRIP) strips Test each day as directed. 5/3/16  Yes Mayito Carlos MD   lancets (ACCU-CHEK FASTCLIX) Misc Test each day as directed. 7/28/15  Yes Mayito Carlos MD   latanoprost (XALATAN) 0.005 % ophthalmic solution Administer 1 drop to both eyes at bedtime.   Yes PROVIDER, HISTORICAL   LORAZEPAM ORAL Take 1 mg by mouth every evening.    Yes PROVIDER, HISTORICAL   omeprazole (PRILOSEC) 20 MG capsule Take 1 capsule (20 mg total) by mouth daily.  Patient taking differently:  Take 20 mg by mouth every evening.  1/27/17  Yes Mayito Carlos MD   PARoxetine (PAXIL) 20 MG tablet Take 40 mg by mouth every morning. For a total dose of 50 mg a day.   Yes PROVIDER, HISTORICAL   simvastatin (ZOCOR) 20 MG tablet TAKE ONE TABLET BY MOUTH EVERY EVENING ([facility] [id number]) 6/16/17  Yes Mayito Carlos MD   timolol maleate (TIMOPTIC) 0.5 % ophthalmic solution Administer 1 drop to both eyes daily.   Yes PROVIDER, HISTORICAL   omeprazole (PRILOSEC) 20 MG capsule Take 1 capsule (20 mg total) by mouth daily. 10/3/17   Mayito Carlos MD   PARoxetine (PAXIL) 20 MG tablet Take 20 mg by mouth every evening. For a total dose of 50 mg a day.  10/3/17  PROVIDER, HISTORICAL       Allergies:   Allergies   Allergen Reactions     Ibuprofen Nausea Only       Immunizations:   Immunization History   Administered Date(s) Administered     DT (pediatric) 04/12/2004     Influenza [id number], 01/15/2010     Influenza high dose, seasonal 10/27/2015, 11/07/2016     Influenza, inj, historic 10/22/2007, 10/20/2008, 09/15/2009, 09/23/2010, 10/21/2011     Influenza, seasonal,quad inj 6-35 mos 12/10/2012, 09/23/2013, 09/23/2014     Pneumo Conj 13-V (2010&after) 07/28/2015     Pneumo Polysac 23-V 10/20/2008     Td, historic 04/12/2004, 01/23/2014     Tdap 01/23/2014     ZOSTER 01/01/1900, 06/20/2012       Exam Chest clear to auscultation and percussion.  Heart tones regular rhythm without murmur rub or gallop.  Abdomen soft nontender no organomegaly.  No peritoneal signs.  Extremities free of edema cyanosis or clubbing.  Neck veins nondistended no thyromegaly or scleral icterus noted, carotids full.  Skin warm and dry easily conversant good spirited.  Normal intelligence.  Neurologically intact no gross localizing findings.  Weight up 2 pounds to 164.  Overweight BMI 26.47.    Assessment and Plan  Diabetes mellitus type II check blood sugar A1c lipid panel urine for microalbumin today.    Left arm numbness may be  neuropathic in etiology suggest reconsultation with neurologist Dr. Gaines eighth floor [facility].    Rectal bleeding last colonoscopy allCLEAR February 25, 2014.  Suggest reconsultation with Dr. Bonnie Schneider now RTC 2-3 months time.    Overweight.  BMI 26.47 see below.    Time: total time spent with the patient was 25 minutes of which >50% was spent in counseling and coordination of care    The following high BMI interventions were performed this visit: encouragement to exercise    Mayito Carlos MD    Patient Active Problem List   Diagnosis     Hiatal Hernia     Lyme Disease     Benign Prostatic Hypertrophy     Type 2 Diabetes Mellitus - Uncomplicated, Controlled     Hyperlipidemia     Adjustment Disorder With Anxiety     Basal Cell Carcinoma Of The Skin

## 2021-06-13 NOTE — PROGRESS NOTES
Office Visit - Follow up    Wilfrid Ortez   79 y.o. male    Date of Visit: 2020    Chief Complaint   Patient presents with     Diabetes     Hyperlipidemia       Subjective: Diabetes mellitus type 2.    Recent COVID-19 test done 2020 allCLEAR.    Feels well wears a mask.    No blood in stool or urine no chest pain shortness of breath medication list reviewed reconciled.  The patient informed me that his first wife  of Covid he was  from her and the marriage was abnormal.  The patient med list was reviewed reconciled in the chart he has had little or no panic attacks.  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  His problem list was reviewed.  He has had a history of panic attacks and anxiety without depressive features to suggest any suicidal ideations.    ROS: A comprehensive review of systems was performed and was otherwise negative    Medications:  Prior to Admission medications    Medication Sig Start Date End Date Taking? Authorizing Provider   aspirin 325 MG tablet Take 325 mg by mouth daily.   Yes PROVIDER, HISTORICAL   blood glucose test strips Use 1 each As Directed as needed. Dispense brand per patient's insurance at pharmacy discretion. 20  Yes Myaito Carlos MD   dorzolamide-timolol (COSOPT) 22.3-6.8 mg/mL ophthalmic solution Administer 1 drop to both eyes 2 (two) times a day. 18  Yes PROVIDER, HISTORICAL   gabapentin (NEURONTIN) 100 MG capsule Take 300 mg by mouth 2 (two) times a day. 10/29/20  Yes Mayito Carlos MD   generic lancets (ACCU-CHEK FASTCLIX LANCING DEV) Test each day as directed. 20  Yes Mayito Carlos MD   latanoprost (XALATAN) 0.005 % ophthalmic solution Administer 1 drop to both eyes at bedtime.   Yes PROVIDER, HISTORICAL   LORazepam (ATIVAN) 0.5 MG tablet Take 0.5 mg by mouth at bedtime. 18  Yes PROVIDER, HISTORICAL   omeprazole (PRILOSEC) 20 MG capsule TAKE 1 CAPSULE(20 MG) BY MOUTH  DAILY 2/20/20  Yes Mayito Carlos MD   PARoxetine (PAXIL) 20 MG tablet Take 40 mg by mouth every morning. And 1/2 tablet (10 mg) at night. For a total dose of 50 mg a day.   Yes PROVIDER, HISTORICAL   propranolol (INDERAL) 10 MG tablet Take 10 mg by mouth 2 (two) times a day. 6/15/18  Yes PROVIDER, HISTORICAL   simvastatin (ZOCOR) 20 MG tablet TAKE ONE TABLET BY MOUTH EVERY EVENING 4/10/20  Yes Mayito Carlos MD       Allergies:   Allergies   Allergen Reactions     Ibuprofen Nausea Only       Immunizations:   Immunization History   Administered Date(s) Administered     DT (pediatric) 04/12/2004     Influenza Q9r3-56, 01/15/2010     Influenza high dose,seasonal,PF, 65+ yrs 10/27/2015, 11/07/2016, 01/04/2018, 10/30/2018, 10/18/2019     Influenza, inj, historic,unspecified 10/22/2007, 10/20/2008, 09/15/2009, 09/23/2010, 10/21/2011     Influenza, seasonal,quad inj 6-35 mos 12/10/2012, 09/23/2013, 09/23/2014     Pneumo Conj 13-V (2010&after) 07/28/2015     Pneumo Polysac 23-V 10/20/2008     Td,adult,historic,unspecified 04/12/2004, 01/23/2014     Tdap 01/23/2014     ZOSTER, LIVE 01/01/1900, 06/20/2012       Exam Chest clear to auscultation and percussion.  Heart tones regular rhythm without murmur rub or gallop.  Abdomen soft nontender no organomegaly.  No peritoneal signs.  Extremities free of edema cyanosis or clubbing.  Neck veins nondistended no thyromegaly or scleral icterus noted, carotids full.  Skin warm and dry easily conversant good spirited.  Normal intelligence.  Neurologically intact no gross localizing findings.  Personal hygiene is questionable.    66 inches tall weight down 1 pound from previous at 153.  BMI 25    136/76 pulse 66 respirations 18 O2 sats 98% on room air temperature this afternoon 97.2 degrees he is easily conversant good spirited    Assessment and Plan  Diabetes mellitus type 2 check A1c blood sugar lipid panel urine for microalbumin today stable.    Recent COVID-19 testing by  after meals are - October 14, 2020.    Unsafe sexual behavior\homosexual check HIV antibody test.    Screen for prostate cancer with history of BPH.  PSA level pending.    Time: total time spent with the patient was 25 minutes of which >50% was spent in counseling and coordination of care        Mayito Carlos MD    Patient Active Problem List   Diagnosis     Hiatal Hernia     Lyme Disease     Benign Prostatic Hypertrophy     Type 2 Diabetes Mellitus - Uncomplicated, Controlled     Hyperlipidemia     Adjustment Disorder With Anxiety     Basal Cell Carcinoma Of The Skin     Seizure (H)

## 2021-06-14 NOTE — TELEPHONE ENCOUNTER
Seems to be have been going on for a month, but it is much worse since Saturday    Incontinence with stool  Urgency     No appetite     Seen at [facility] for a leg problem and they changed his gabapentin dose    History of back, hip and knee problems    No urinary symptoms  No fever  No worsening back pain  No worsening pain  No abdominal pain    Triaged to a disposition of see PCP within 3 days. Patient states he will go to  today.     Reason for Disposition    [1] Stool incontinence (leaking stool) AND [2] worse than normal    Additional Information    Negative: Sounds like a life-threatening emergency to the triager    Negative: [1] Abdomen pain is main symptom AND [2] male    Negative: [1] Abdomen pain is main symptom AND [2] adult female    Negative: Rectal bleeding or blood in stool is main symptom    Negative: Rectal pain or itching is main symptom    Negative: [1] Last bowel movement  (BM) > 4 days AND [2] abdomen looks much more swollen than usual    Negative: [1] Vomiting AND [2] abdomen looks much more swollen than usual    Negative: [1] Vomiting AND [2] contains bile (green color)    Negative: Patient sounds very sick or weak to the triager    Negative: [1] Constant abdominal pain AND [2] present > 2 hours    Negative: [1] Constipation AND [2] worse than normal    Negative: Last bowel movement (BM) > 4 days ago    Negative: Abdomen is more swollen than usual    Negative: [1] Intermittent mild abdominal pain AND [2] fever    Protocols used: SPINAL CORD INJURY - CONSTIPATION-A-    Janelle Prater RN Triage Nurse Advisor

## 2021-06-15 NOTE — TELEPHONE ENCOUNTER
Patient needs a phone visit sometime next week for me to review and discussed with patient this request.  My schedule for tomorrow Friday, February 12 this fall.

## 2021-06-15 NOTE — PROGRESS NOTES
Wilfrid Ortez is a 80 y.o. male who is being evaluated via a billable telephone visit.      What phone number would you like to be contacted at? [phone]  How would you like to obtain your AVS? AVS Preference: [place].    Assessment & Plan     Diabetes mellitus type 2.    Severe osteoarthritis right hip needs total hip arthroplasty.  I encouraged the patient have a total hip arthroplasty with his current orthopedist.    Balance issues uses a walker at home needs medical help in the home already has attained a personal care attendant Gee age 46 who is helpful with him and has caught him a few times and preventing him from falling.  The patient needs the help of a personal care attendant like Gee with cooking cleaning and mobility issues because of bone-on-bone osteoarthritis right hip.  Steroid injections are to be avoided as they will exacerbate or make diabetes mellitus blood sugar control more difficult and awkward.    Covid vaccine given 2 weeks prior regions.    Review of external notes as documented in note  11 minutes spent on the date of the encounter doing chart review, patient visit and documentation        No follow-ups on file.    Mayito Carlos MD  [facility]   Wilfrid Ortez is 80 y.o. and presents today for the following health issues   HPI       Hypertension Follow-up      Do you check your blood pressure regularly outside of the clinic? Yes     Are you following a low salt diet? No    Are your blood pressures ever more than 140 on the top number (systolic) OR more       than 90 on the bottom number (diastolic), for example 140/90? No      How many servings of fruits and vegetables do you eat daily?  0-1    On average, how many sweetened beverages do you drink each day (Examples: soda, juice, sweet tea, etc.  Do NOT count diet or artificially sweetened beverages)?   0    How many days per week do you exercise enough to make your heart beat  faster? 3 or less    How many minutes a day do you exercise enough to make your heart beat faster? 9 or less    How many days per week do you miss taking your medication? 0        Review of Systems  No blood in stool or urine no chest pain shortness of breath severe pain with walking in the right hip and at night in bed.    When he last saw the orthopedist he told the orthopedist he did not have much pain.    Steroid injections are given into the right hip but because of upcoming second Covid vaccine this has to be deferred his steroid injection until March 4, 2021.  I discouraged the patient from continued steroid injections at this will make his diabetes mellitus more awkward and difficult to treat and raise the blood sugar.  Interfering with healing if and when he does need hip replacement.    Non-smoker no excess alcohol.      Objective       Vitals:  No vitals were obtained today due to virtual visit.    Physical Exam  No physical examination was done as this was a virtual visit by telephone.            Phone call duration: 11 minutes

## 2021-06-15 NOTE — TELEPHONE ENCOUNTER
"Patient calling this afternoon to request a letter of medical necessity or something similar to be addressed to his landlord.  Patient currently has a young person living with him to assist with his mobility, cooking, cleaning, etc as patient is limited to his walker and has right hip issues in regards to movement.  Landlorkathy is attempting to evict the young person AND patient \"on cause for breach of lease\" because his lease only shows patients information.  A letter from Dr Carlos will strengthen his case to maintain his lease and the young person assisting him in his home.    Any questions please call patient at:  [phone].    If letter is able to be written patient would like letter to be left at  for .  Please notify patient when it is ready.  "

## 2021-06-15 NOTE — PROGRESS NOTES
Office Visit - Follow up    Wilfrid Ortez   77 y.o. male    Date of Visit: 1/4/2018    Chief Complaint   Patient presents with     Diabetes       Subjective: Diabetes mellitus type 2.    Shaking spell whole-body.  Tried lorazepam seem to help.  It lasted more than 24 hours.  Patient has an upcoming appointment with neurology.    Annual wellness visit is scheduled for next month last laboratory tests in terms of diabetes reviewed from October 3, 2017.  Appointment with neurologist for tremors or shaking spell as alluded to earlier slated for March 23, 2018.    Dermatology consult dated January 30, 2018 reviewed.  Flu shot given left deltoid.  Diabetic foot examination all clear.    Basal cell skin cancer removed left face also infected sebaceous cyst there spontaneously ruptured.  Colonoscopy normal February 25, 2014 with Dr. Bonnie Langley.  Recent rectal examination with Dr. Schneider showed evidence for internal hemorrhoids.    No blood in stool or urine no medication list reviewed well-tolerated.    ROS: A comprehensive review of systems was performed and was otherwise negative    Medications:  Prior to Admission medications    Medication Sig Start Date End Date Taking? Authorizing Provider   aspirin 325 MG tablet Take 325 mg by mouth daily.   Yes PROVIDER, HISTORICAL   blood glucose test (ACCU-CHEK SMARTVIEW TEST STRIP) strips Test each day as directed. 5/3/16  Yes Mayito Carlos MD   lancets (ACCU-CHEK FASTCLIX) Misc Test each day as directed. 7/28/15  Yes Mayito Carlos MD   latanoprost (XALATAN) 0.005 % ophthalmic solution Administer 1 drop to both eyes at bedtime.   Yes PROVIDER, HISTORICAL   LORAZEPAM ORAL Take 1 mg by mouth every evening.    Yes PROVIDER, HISTORICAL   omeprazole (PRILOSEC) 20 MG capsule Take 1 capsule (20 mg total) by mouth daily. 10/3/17  Yes Mayito Carlos MD   PARoxetine (PAXIL) 20 MG tablet Take 40 mg by mouth every morning. For a total dose of 50 mg a day.   Yes  PROVIDER, HISTORICAL   simvastatin (ZOCOR) 20 MG tablet TAKE ONE TABLET BY MOUTH EVERY EVENING (PT ANN [address]-XQ) 6/16/17  Yes Mayito Carlos MD   timolol maleate (TIMOPTIC) 0.5 % ophthalmic solution Administer 1 drop to both eyes daily.   Yes PROVIDER, HISTORICAL   omeprazole (PRILOSEC) 20 MG capsule Take 1 capsule (20 mg total) by mouth daily.  Patient taking differently: Take 20 mg by mouth every evening.  1/27/17 1/4/18 Yes Mayito Carlos MD       Allergies:   Allergies   Allergen Reactions     Ibuprofen Nausea Only       Immunizations:   Immunization History   Administered Date(s) Administered     DT (pediatric) 04/12/2004     Influenza [id number], 01/15/2010     Influenza high dose, seasonal 10/27/2015, 11/07/2016, 01/04/2018     Influenza, inj, historic,unspecified 10/22/2007, 10/20/2008, 09/15/2009, 09/23/2010, 10/21/2011     Influenza, seasonal,quad inj 6-35 mos 12/10/2012, 09/23/2013, 09/23/2014     Pneumo Conj 13-V (2010&after) 07/28/2015     Pneumo Polysac 23-V 10/20/2008     Td,adult,historic,unspecified 04/12/2004, 01/23/2014     Tdap 01/23/2014     ZOSTER 01/01/1900, 06/20/2012       Exam Chest clear to auscultation and percussion.  Heart tones regular rhythm without murmur rub or gallop.  Abdomen soft nontender no organomegaly.  No peritoneal signs.  Extremities free of edema cyanosis or clubbing.  Neck veins nondistended no thyromegaly or scleral icterus noted, carotids full.  Skin warm and dry easily conversant good spirited.  Normal intelligence.  Neurologically intact no gross localizing findings.      Assessment and Plan  Diabetes mellitus type 2 check A1c plus blood sugar urine for microalbumin lipid panel and HIV antibody testing today.    Tremor or seizure disorder suggest neurologic consultation perhaps sooner than March 23, 2018.    Flu shot given today left deltoid.    Basal cell skin cancer removed left face Dr. Geradro fitz[facility]    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  Treated.    Ibuprofen allergy.    Rectal bleeding due to internal hemorrhoids last colonoscopy allCLEAR with Dr. Bonnie Langley 2/25/2014.    Time: total time spent with the patient was 40 minutes of which >50% was spent in counseling and coordination of care    The following high BMI interventions were performed this visit: encouragement to exercise    Mayito Carlos MD    Patient Active Problem List   Diagnosis     Hiatal Hernia     Lyme Disease     Benign Prostatic Hypertrophy     Type 2 Diabetes Mellitus - Uncomplicated, Controlled     Hyperlipidemia     Adjustment Disorder With Anxiety     Basal Cell Carcinoma Of The Skin

## 2021-06-16 PROBLEM — M16.9 HIP OSTEOARTHRITIS: Status: ACTIVE | Noted: 2021-05-04

## 2021-06-16 PROBLEM — R56.9 SEIZURE (H): Status: ACTIVE | Noted: 2019-10-18

## 2021-06-16 PROBLEM — Z96.641 STATUS POST RIGHT HIP REPLACEMENT: Status: ACTIVE | Noted: 2021-05-06

## 2021-06-16 NOTE — PROGRESS NOTES
Assessment and Plan:   Annual wellness visit and physical exam.        1. Type 2 diabetes mellitus  Annual wellness visit and physical exam.  Fasting for this 77-year-old male.  A 12 hour fast.  - blood-glucose meter Misc; Use to test once per day  Dispense: 1 each; Refill: 0  - blood glucose test (ACCU-CHEK SMARTVIEW TEST STRIP) strips; Test each day as directed.  Dispense: 100 strip; Refill: 6  - HM2(CBC w/o Differential)  - Comprehensive Metabolic Panel  - Lipid Cascade  - Glycosylated Hemoglobin A1c  - Thyroid Stimulating Hormone (TSH)  - Urinalysis-UC if Indicated  - Microalbumin, Random Urine    2. Routine general medical examination at a health care facility  Annual wellness visit and physical exam for this 77-year-old male.  A 12 hour fast.  - HM2(CBC w/o Differential)  - Comprehensive Metabolic Panel  - Lipid Cascade  - Glycosylated Hemoglobin A1c  - Thyroid Stimulating Hormone (TSH)  - Urinalysis-UC if Indicated  - Microalbumin, Random Urine     The patient's current medical problems were reviewed.    I have had an Advance Directives discussion with the patient.  The following health maintenance schedule was reviewed with the patient and provided in printed form in the after visit summary:   Health Maintenance   Topic Date Due     DIABETES OPHTHALMOLOGY EXAM  10/27/2017     DIABETES FOOT EXAM  03/13/2018     DIABETES HEMOGLOBIN A1C  07/04/2018     DIABETES FOLLOW-UP  07/04/2018     DIABETES URINE MICROALBUMIN  01/04/2019     FALL RISK ASSESSMENT  01/04/2019     ADVANCE DIRECTIVES DISCUSSED WITH PATIENT  03/13/2022     TD 18+ HE  01/23/2024     COLONOSCOPY  02/25/2024     PNEUMOCOCCAL POLYSACCHARIDE VACCINE AGE 65 AND OVER  Completed     INFLUENZA VACCINE RULE BASED  Completed     PNEUMOCOCCAL CONJUGATE VACCINE FOR ADULTS (PCV13 OR PREVNAR)  Completed     ZOSTER VACCINE  Completed        Subjective:   Chief Complaint: Wilfrid Ortez is an 77 y.o. male here for an Annual Wellness visit.   HPI:  77-year-old male here for annual wellness visit and physical exam.  A 12 hour fast.    Non-smoker no excess alcohol allergy ibuprofen.  GI upset nausea.    Colonoscopy normal with Dr. Bonnie Langley 2014.    Obstructive sleep apnea as diagnosed by Dr. Primo toney uses a CPAP mask.    Dr. Garcia also suggested high-dose of lorazepam at bedtime for insomnia.  Followed by neuropsychiatrist for panic attacks and anxiety.  History of basal cell skin cancer as well as history of BPH and status post TURP.    Hiatal hernia and history of hyperlipidemia and treated Lyme disease.  Diabetes mellitus type 2.  Former homosexual male with whom he lived is now in halfway.  Patient lives alone.  Mother  72 cancer of the throat.    Father  57 coronary artery disease and rheumatic heart disease.    Patient was  once no children  once.  Followed in the past by dermatology psychiatry urology ophthalmology pulmonary and sleep medicine and dentist Dr. Goldberg.    Review of Systems:    Please see above.  The rest of the review of systems are negative for all systems.    Patient Care Team:  Mayito Carlos MD as PCP - General     Patient Active Problem List   Diagnosis     Hiatal Hernia     Lyme Disease     Benign Prostatic Hypertrophy     Type 2 Diabetes Mellitus - Uncomplicated, Controlled     Hyperlipidemia     Adjustment Disorder With Anxiety     Basal Cell Carcinoma Of The Skin     Past Medical History:   Diagnosis Date     Anxiety      Blepharospasm syndrome      BPH (benign prostatic hyperplasia)      Cancer      Depression      Diabetes mellitus      Hiatal hernia      Hyperlipidemia      Lyme disease      Panic      Panic attack      Sleep apnea       Past Surgical History:   Procedure Laterality Date     TRANSURETHRAL RESECTION OF PROSTATE        No family history on file.   Social History     Social History     Marital status:      Spouse name: N/A     Number of children: N/A      "Years of education: N/A     Occupational History     Not on file.     Social History Main Topics     Smoking status: Never Smoker     Smokeless tobacco: Never Used     Alcohol use No     Drug use: No     Sexual activity: Not on file     Other Topics Concern     Not on file     Social History Narrative      Current Outpatient Prescriptions   Medication Sig Dispense Refill     aspirin 325 MG tablet Take 325 mg by mouth daily.       blood glucose test (ACCU-CHEK SMARTVIEW TEST STRIP) strips Test each day as directed. 100 strip 6     lancets (ACCU-CHEK FASTCLIX) Misc Test each day as directed. 30 each 6     latanoprost (XALATAN) 0.005 % ophthalmic solution Administer 1 drop to both eyes at bedtime.       LORAZEPAM ORAL Take 1 mg by mouth every evening.        omeprazole (PRILOSEC) 20 MG capsule Take 1 capsule (20 mg total) by mouth daily. 90 capsule 5     omeprazole (PRILOSEC) 20 MG capsule TAKE 1 CAPSULE(20 MG) BY MOUTH DAILY 30 capsule 0     PARoxetine (PAXIL) 20 MG tablet Take 40 mg by mouth every morning. For a total dose of 50 mg a day.       simvastatin (ZOCOR) 20 MG tablet TAKE ONE TABLET BY MOUTH EVERY EVENING (PT ANN [address]-WK) 90 tablet 2     timolol maleate (TIMOPTIC) 0.5 % ophthalmic solution Administer 1 drop to both eyes daily.       blood-glucose meter Misc Use to test once per day 1 each 0     No current facility-administered medications for this visit.       Objective:   Vital Signs:   Visit Vitals     /70     Pulse 66     Ht 5' 6\" (1.676 m)     Wt 164 lb (74.4 kg)     BMI 26.47 kg/m2        VisionScreening:  No exam data present     PHYSICAL EXAM  Chest clear to auscultation and percussion.  Heart tones regular rhythm without murmur rub or gallop.  Abdomen soft nontender no organomegaly.  No peritoneal signs.  Extremities free of edema cyanosis or clubbing.  Neck veins nondistended no thyromegaly or scleral icterus noted, carotids full.  Skin warm and dry easily conversant good spirited.  " Normal intelligence.  Neurologically intact no gross localizing findings.  Rest of exam negative in its entirety including skin negative lymph negative neuro negative skin negative HEENT negative back straight no severe spine tenderness general rectal exam negative small prostate without nodularity good pulses in all 4 extremities.  Some laxity noted in anal sphincter.    Assessment Results 2/23/2018   Activities of Daily Living No help needed   Instrumental Activities of Daily Living No help needed   Get Up and Go Score 12 seconds or more   Mini Cog Total Score 5   Some recent data might be hidden     A Mini-Cog score of 0-2 suggests the possibility of dementia, score of 3-5 suggests no dementia    Identified Health Risks:     He is at risk for lack of exercise and has been provided with information to increase physical activity for the benefit of his well-being.  The patient was counseled and encouraged to consider modifying their diet and eating habits. He was provided with information on recommended healthy diet options.  The patient was provided with written information regarding signs of hearing loss.  The patient's SAVANNA-7 score is consistent with probably anxiety disorder.  He was provided with patient information regarding anxiety and was advised to set up a follow up appointment in 8 weeks to further address this issue.  He is at risk for falling and has been provided with information to reduce the risk of falling at home.  Patient's advanced directive was discussed and I am comfortable with the patient's wishes.

## 2021-06-16 NOTE — PROGRESS NOTES
Assessment & Plan     Diabetes mellitus type 2 check A1c blood sugar potassium and lipid panel today.    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  Needs total hip arthroplasty.  Previous x-rays show bone-on-bone both right hip and right knee.  Dr. Jake Valdes recommended from [facility]    Hyperlipidemia no target organ damage related to same.    Shingles December 28, 2020 resolved.    Covid vaccine administered February 2 and February 23, 2021.    Diabetic foot examination done allCLEAR.    Hypertension controlled 128/70 stable.  No target organ damage related to same.    Review of external notes as documented in note  25 minutes spent on the date of the encounter doing chart review, patient visit and documentation        No follow-ups on file.    Mayito Carlos MD  [facility]   Wilfrid Ortez is 80 y.o. and presents today for the following health issues   HPI       Hypertension Follow-up      Do you check your blood pressure regularly outside of the clinic? Yes     Are you following a low salt diet? No    Are your blood pressures ever more than 140 on the top number (systolic) OR more       than 90 on the bottom number (diastolic), for example 140/90? No      How many servings of fruits and vegetables do you eat daily?  0-1    On average, how many sweetened beverages do you drink each day (Examples: soda, juice, sweet tea, etc.  Do NOT count diet or artificially sweetened beverages)?   0    How many days per week do you exercise enough to make your heart beat faster? 3 or less    How many minutes a day do you exercise enough to make your heart beat faster? 9 or less    How many days per week do you miss taking your medication? 0        Review of Systems  No blood in stool or urine no chest pain or shortness of breath.    Medication list reviewed reconciled generally well-tolerated.    Non-smoker no  "excess alcohol.  Uses a cane for ambulation.  When he walks is a clicking sound emanating from his right hip posteriorly.      Objective    /70 (Patient Site: Right Arm, Patient Position: Sitting)   Pulse 67   Temp 97  F (36.1  C)   Ht 5' 6\" (1.676 m)   Wt 153 lb (69.4 kg)   SpO2 96%   BMI 24.69 kg/m    Body mass index is 24.69 kg/m .  Physical Exam  Chest clear to auscultation and percussion.  Heart tones regular rhythm without murmur rub or gallop.  Abdomen soft nontender no organomegaly.  No peritoneal signs.  Extremities free of edema cyanosis or clubbing.  Neck veins nondistended no thyromegaly or scleral icterus noted, carotids full.  Skin warm and dry easily conversant good spirited.  Normal intelligence.  Neurologically intact no gross localizing findings.              "

## 2021-06-16 NOTE — PROGRESS NOTES
Office Visit - Physical    Wilfrid Ortez   80 y.o. male    Date of Visit: 4/20/2021    Chief Complaint   Patient presents with     Pre-op Exam     Right total hip arthroplasty  Dr. Frandy Clinton   [facility]   5/4/2021       Subjective: PRE- operative examination in anticipation of right total hip arthroplasty May 4, 2021.  80-year-old male accompanied by a personal care attendant history of type 2 diabetes and severe pain with positive Primo's test right hip on exam.  Needs total hip arthroplasty with Dr. Jake Clinton at [facility] May 4, 2021.    Colonoscopy dated February 25, 2014 with Dr. Bonnie Schneider normal.  Retired executive.  Non-smoker no excess alcohol.  History of prior capsulotomy after cataract surgery.  Consulting ophthalmologist Dr. Wilfrid Luque.  Allergies ibuprofen.    ROS: A comprehensive review of systems was performed and was otherwise negative    Medications:   Prior to Admission medications    Medication Sig Start Date End Date Taking? Authorizing Provider   aspirin 325 MG tablet Take 325 mg by mouth daily.   Yes PROVIDER, HISTORICAL   dorzolamide-timolol (COSOPT) 22.3-6.8 mg/mL ophthalmic solution Administer 1 drop to both eyes 2 (two) times a day. 6/13/18  Yes PROVIDER, HISTORICAL   gabapentin (NEURONTIN) 100 MG capsule Take 300 mg by mouth 2 (two) times a day.  Patient taking differently: Take 300 mg by mouth 2 (two) times a day. 300mg in the am  And 600mg in the pm 10/29/20  Yes Mayito Carlos MD   generic lancets (ACCU-CHEK FASTCLIX LANCING DEV) Test each day as directed. 7/21/20  Yes Mayito Carlos MD   latanoprost (XALATAN) 0.005 % ophthalmic solution Administer 1 drop to both eyes at bedtime.   Yes PROVIDER, HISTORICAL   LORazepam (ATIVAN) 0.5 MG tablet Take 0.5 mg by mouth at bedtime. 5/17/18  Yes PROVIDER, HISTORICAL   omeprazole (PRILOSEC) 20 MG capsule TAKE 1 CAPSULE(20 MG) BY MOUTH DAILY 4/10/21  Yes Mayito Carlos MD   PARoxetine  (PAXIL) 20 MG tablet Take 40 mg by mouth every morning. And 1/2 tablet (10 mg) at night. For a total dose of 50 mg a day.   Yes PROVIDER, HISTORICAL   propranolol (INDERAL) 10 MG tablet Take 10 mg by mouth 2 (two) times a day. 6/15/18  Yes PROVIDER, HISTORICAL   simvastatin (ZOCOR) 20 MG tablet TAKE ONE TABLET BY MOUTH EVERY EVENING 3/22/21  Yes Mayito Carlos MD   blood glucose test strips Use 1 each As Directed as needed. Dispense brand per patient's insurance at pharmacy discretion. 7/21/20   Mayito Carlos MD       Allergies:  Allergies   Allergen Reactions     Ibuprofen Nausea Only       Immunizations:   Immunization History   Administered Date(s) Administered     DT (pediatric) 04/12/2004     Influenza K7n1-09, 01/15/2010     Influenza high dose,seasonal,PF, 65+ yrs 10/27/2015, 11/07/2016, 01/04/2018, 10/30/2018, 10/18/2019     Influenza, inj, historic,unspecified 10/22/2007, 10/20/2008, 09/15/2009, 09/23/2010, 10/21/2011     Influenza, seasonal,quad inj 6-35 mos 12/10/2012, 09/23/2013, 09/23/2014     Pneumo Conj 13-V (2010&after) 07/28/2015     Pneumo Polysac 23-V 10/20/2008     Td,adult,historic,unspecified 04/12/2004, 01/23/2014     Tdap 01/23/2014     ZOSTER, LIVE 01/01/1900, 06/20/2012       Health Maintenance: Immunizations reviewed and up-to-date.    Non-smoker    No excess alcohol.  Accompanied today by personal care attendant.    Ibuprofen nausea no rash.    Past Medical History: Diabetes mellitus type 2.    Lyme disease by history    Hyperlipidemia.    Basal cell skin cancer removed with postoperative plastic reconstruction done near his nose.    Prior history of pneumonia better after treatment with Z-Evaristo January 2019 asymptomatic at this juncture.    Panic attacks and anxiety.  Disabled from work secondary to the latter condition psychologically.    Past Surgical History: No anesthetic complications with any prior surgery.    TURP for BPH no cancer.  Prior cataract surgeries uneventful.   Postop with good results.    Family History: Patient is single.  1 brother  atrial fibrillation in his 70s.    Father  57 rheumatic heart disease.    Mother  at 72 of cancer.    1 brother  may have had hemochromatosis.     once no children ex-wife has subsequently passed on.    Social History: Retired local  here in [place]    Active at [employer].    Exam pale in color not in acute distress wears glasses accompanied by his personal care attendant Gee chest is clear heart tones regular rhythm without murmur rub or gallop EKG was normal sinus mechanism rate 75 normal EKG skin negative lymph negative carotids no bruits thyroid negative abdomen mild centripetal obesity positive Primo's test with internal rotation restricted left hip negative to passive motion.  Able to walk.  And bear weight most of the pain in the right hip is at night he is ambulatory.  Good pulses on all 4 extremities including the feet no ankle edema cyanosis or clubbing.  Fungal infection of toenails right foot.  No skin breakdown or ulceration of the skin of lower extremities.    65 inches tall weight down 1 pound from previous 152 currently pounds 25.3 is the BMI    132/72 pulse 72 regular respirations 18 O2 sats on room air 97% temperature this morning 96.9 degrees he is conversant but quiet by nature not in acute distress or toxic not acutely or chronically ill personal hygiene is questionable he is intelligent soft-spoken.    Assessment and Plan  Medically acceptable risk for anticipated surgery preoperatively will check hemogram comprehensive metabolic profile urinalysis electrocardiogram showed sinus mechanism rate 75 normal EKG.    Total time spent with the patient today was 40 minutes of which greater than 50% was spent in counseling and coordination of care.    Mayito Carlos MD    Patient Active Problem List   Diagnosis     Hiatal Hernia     Lyme Disease      Benign Prostatic Hypertrophy     Type 2 Diabetes Mellitus - Uncomplicated, Controlled     Hyperlipidemia     Adjustment Disorder With Anxiety     Basal Cell Carcinoma Of The Skin     Seizure (H)

## 2021-06-17 NOTE — TELEPHONE ENCOUNTER
Reason for Call:  Other Verbal orders      Detailed comments: Need verbal orders for OT & Speech therapy.  OT for 2 times for 2 weeks due to right hip replacement.   Speech therapy evaluation for cognitive decline.     Please call back to Antonia @ [phone]    Phone Number Patient can be reached at: Other phone number:  [facility] @ [phone]    Best Time:     Can we leave a detailed message on this number?: Yes    Call taken on 5/17/2021 at 9:29 AM by Lilly Deshpande

## 2021-06-17 NOTE — ANESTHESIA PREPROCEDURE EVALUATION
Anesthesia Evaluation      Patient summary reviewed   No history of anesthetic complications     Airway   Mallampati: II  Neck ROM: full   Pulmonary - normal exam   (+) sleep apnea on no CPAP, ,                          Cardiovascular - normal exam  (+) hypertension, , hypercholesterolemia,      Neuro/Psych    (+) seizures, neuromuscular disease,  depression, anxiety/panic attacks,     Endo/Other    (+) diabetes mellitus, arthritis,      GI/Hepatic/Renal    (+) hiatal hernia, GERD,             Dental      Comment: Fair dentition.                       Anesthesia Plan  Planned anesthetic: spinal  Decadron, Zofran.  Diprivan infusion.  Ketamine (0.5 mg/kg).  Magnesium.  ASA 3     Anesthetic plan and risks discussed with: patient and child/children  Anesthesia plan special considerations: antiemetics,   Post-op plan: routine recovery

## 2021-06-17 NOTE — ANESTHESIA PROCEDURE NOTES
Spinal Block    Patient location during procedure: OR  Start time: 5/4/2021 10:24 AM  End time: 5/4/2021 10:28 AM  Reason for block: primary anesthetic    Staffing:  Performing  Anesthesiologist: Jairo Matute MD    Preanesthetic Checklist  Completed: patient identified, risks, benefits, and alternatives discussed, timeout performed, consent obtained, airway assessed, oxygen available, suction available, emergency drugs available and hand hygiene performed  Spinal Block  Patient position: sitting  Prep: ChloraPrep  Patient monitoring: heart rate, cardiac monitor, continuous pulse ox and blood pressure  Approach: midline  Location: L3-4  Injection technique: single-shot  Needle type: pencil-tip   Needle gauge: 25 G      Additional Notes:  One needle pass, good CSF flow in all four quadrants, no heme appreciated

## 2021-06-17 NOTE — TELEPHONE ENCOUNTER
Requesting an extension on OT evaluation orders into the week of 5/17.  Pt has not been able to be seen d/t scheduling conflict.

## 2021-06-17 NOTE — PATIENT INSTRUCTIONS - HE
Patient Instructions by Vita Dumont PT at 12/5/2019 11:30 AM     Author: Vita Dumont PT Service: -- Author Type: Physical Therapist    Filed: 12/5/2019 12:27 PM Encounter Date: 12/5/2019 Status: Signed    : Vita Dumont PT (Physical Therapist)        SCAPULAR RETRACTIONS/ROWING    Draw your shoulder blades back and down.  10x                DOORWAY STRETCH - HIGH    While standing in a doorway, place your arms up on the door frame and lean in until a stretch is felt along the front of your chest and/or shoulders. Your upper arms should be placed upward along the door frame.     NOTE: Your legs should control how much you stretch by bending or straightening your knee through the doorway.        Pec corner stretch    Find a comfortable position for your hands and lightly lean forward into the corner until you feel a good stretch in your pecs. Hold 20-30 sec, x2-3 reps           Look at your hand  Extend your arm out to the side and slightly backward  Follow your hand with your eyes  Get a nice, easy stretch (may get a few tingles which is OK), DO NOT HOLD  Return the hand to the front of the face

## 2021-06-17 NOTE — ANESTHESIA CARE TRANSFER NOTE
Last vitals:   Vitals:    05/04/21 1147   BP: 90/55   Pulse: 94   Resp: 12   Temp: 36.1  C (97  F)   SpO2: 97%     Patient's level of consciousness is drowsy  Spontaneous respirations: yes  Maintains airway independently: yes  Dentition unchanged: yes  Oropharynx: oropharynx clear of all foreign objects    QCDR Measures:  ASA# 20 - Surgical Safety Checklist: WHO surgical safety checklist completed prior to induction    PQRS# 430 - Adult PONV Prevention: 4558F - Pt received => 2 anti-emetic agents (different classes) preop & intraop  ASA# 8 - Peds PONV Prevention: NA - Not pediatric patient, not GA or 2 or more risk factors NOT present  PQRS# 424 - Swati-op Temp Management: 4559F - At least one body temp DOCUMENTED => 35.5C or 95.9F within required timeframe  PQRS# 426 - PACU Transfer Protocol: - Transfer of care checklist used  ASA# 14 - Acute Post-op Pain: ASA14B - Patient did NOT experience pain >= 7 out of 10

## 2021-06-17 NOTE — ANESTHESIA POSTPROCEDURE EVALUATION
Patient: Wilfrid Ortez  Procedure(s):  RIGHT TOTAL HIP ARTHROPLASTY (Right)  Anesthesia type: spinal    Patient location: PACU  Last vitals:   Vitals Value Taken Time   /74 05/04/21 1410   Temp 37  C (98.6  F) 05/04/21 1410   Pulse 73 05/04/21 1410   Resp 16 05/04/21 1410   SpO2 96 % 05/04/21 1410     Post vital signs: stable  Level of consciousness: responds to simple questions, responds to stimulation and resting comfortably  Post-anesthesia pain: pain controlled  Post-anesthesia nausea and vomiting: no  Pulmonary: unassisted, spontaneous ventilation, nasal cannula  Cardiovascular: stable and blood pressure at baseline  Hydration: adequate  Anesthetic events: no    QCDR Measures:  ASA# 11 - Swati-op Cardiac Arrest: ASA11B - Patient did NOT experience unanticipated cardiac arrest  ASA# 12 - Swati-op Mortality Rate: ASA12B - Patient did NOT die  ASA# 13 - PACU Re-Intubation Rate: NA - No ETT / LMA used for case  ASA# 10 - Composite Anes Safety: ASA10A - No serious adverse event    Additional Notes: doing well, resting comfortably between nursing cares; no nausea, VSS

## 2021-06-17 NOTE — PROGRESS NOTES
Medication Therapy Management (MTM) Encounter    Assessment:                                                      1. Type 2 diabetes mellitus   Well controlled as evidenced by recent A1c of 6.9%, meeting goal per ADA guidelines. Lifestyle controlled without medication. No microalbuminria. Blood pressure is well controlled and meeting goal of <130/80 mm Hg per ACC/AHA hypertension guidelines.    On full strength aspirin due to possible TIA on low dose. No s/sx bleeding and also no PPI for gastric protection so reasonable to continue.     2. Hyperlipidemia  Appropriately on a moderate intensity statin given age and diabetes diagnosis per ACC/AHA guidelines.     3. Adjustment disorder with anxiety/Tremor  Educated patient to resume paroxetine as prescribed. Explained this was not to be discontinued post surgery. He is aware of long term adverse effects of regular benzodiazepine, but due to severe anxiety, prefers to continue. Follows closely with psychiatry and neurology. Tremor fairly well controlled, but could consider dose increase in propranolol if needed, but closely monitor BP.     4. Hip OA/Cervical radiculopathy  Recent hip replacement. No pain in past couple days so no longer taking oxycodone. Explained he does not need hydroxyzine or senna any longer either. Due to misunderstanding he also stopped gabapentin after surgery, but since he is not having any shoulder pain, which was the original indication for the medication and it tends to make him tired, advised staying off for now.     5. Hiatal hernia/GERD  Stable and well controlled on PPI.     6. BPH/Urinary retention  Recently prescribed tamsulosin during hospitalization for urinary retention. Medication education provided and advised to start given ongoing symptoms. Recommended follow up with urology as well.     Plan:                                                       Resume paroxetine.     Continue off gabapentin.     Start tamsulosin 0.4 mg every  "evening.     Follow Up  PRN with MTM    Subjective & Objective                                                     Wilfrid Ortez is a 80 y.o. male called for an initial visit for Medication Therapy Management. He was referred to me from  Part D program    Patient consented to a telehealth visit: Yes  Reason for visit: Medication review  Medication Adherence/Access: Sets up weekly pillbox. Some confusion about medications.     1. Type 2 diabetes mellitus   Murali is not on any medication for diabetes. He says he manages this with his diet and exercise. When he gave up pop he noticed BG dropping dramatically. He does check his BG periodically. Typically in 130-140 range.   He is on aspirin 325 mg daily. His father  of a heart attack and did have rheumatic fever as a child. Personally, patient does not have heart disease. He was taking 81 mg of aspirin in the past, but he had an \"episode of passing out\" and the dose was increased. Has not happened since. No s/sx bleeding.     Lab Results   Component Value Date    HGBA1C 6.9 (H) 2021    HGBA1C 6.6 (H) 2020    HGBA1C 6.5 (H) 2020     Lab Results   Component Value Date    MICROALBUR 1.47 2020    LDLCALC 81 2021    CREATININE 1.23 2021     2. Hyperlipidemia  Murali is taking simvastatin 20 mg daily in the evening. No adverse effects noted.      3. Adjustment disorder with anxiety/Tremor  Murali is prescribed paroxetine 40 mg in the morning and 10 mg at night. He thought he was supposed to stop this after the surgery. He did have a panic attack yesterday. He did take 40 mg this morning. He's been on this for about 15-20 years. He was hospitalized for a panic attack at the time it was started. He used to take just one in the morning, but would feel nervous during the day, so increased to two and feels better. He takes lorazepam 0.5 mg at bedtime. He also may use it for panic attacks PRN, but this is infrequent. He see a psychiatrist " who prescribes these medications. Last month he had an episode of amnesia. Went to ED for workup which was negative. Saw neurologist who felt possibly due to transient global amnesia versus panic attack versus mild confusion.  He takes propranolol 10 mg two times a day for tremor. Initially was prescribed for anxiety PRN but now taking for tremor. Still having tremor symptoms depending on the day. Prescribed by neurologist.      PHQ-9 Total Score: 4 (7/20/2020  8:14 AM)    4. Hip OA/Cervical radiculopathy  Murali underwent total hip arthroplasty with Dr. Jake Clinton at [facility] May 4, 2021. He took oxycodone through Saturday. No longer having any pain. Wondering if he needs to take the hydroxyzine or senna-docusate any longer. No itching. Did have loose stool.   He was taking gabapentin 300 mg in the morning and 600 mg in the evening, but is not taking since hospitalization as he thought he was to stop this after surgery. Notes no pain. Typically has pain from his shoulder down his hand from a pinched nerve. He cannot take ibuprofen due to nausea.      5. Hiatal hernia/GERD  Murali is taking omeprazole 20 mg daily in the evening. This has been effective. History of peptic ulcer.     6. BPH/Urinary retention  Murali has history of BPH and TURP. He had some urinary retention post op. Did cath himself this weekend. Hospitalist started Flomax and he did  the prescription, but hasn't started. He does note not urinating fully in the morning, but then seems to empty bladder by lunchtime.       PMH: reviewed in EPIC   Allergies/ADRs: reviewed in EPIC   Alcohol: no   Tobacco:   Social History     Tobacco Use   Smoking Status Never Smoker   Smokeless Tobacco Never Used     Vitals:   BP Readings from Last 3 Encounters:   05/06/21 113/53   04/20/21 132/72   04/01/21 (!) 165/95     Pulse Readings from Last 3 Encounters:   05/06/21 82   04/20/21 72   04/01/21 80     Wt Readings from Last 3 Encounters:   05/04/21 150 lb  6.4 oz (68.2 kg)   04/20/21 152 lb (68.9 kg)   04/01/21 150 lb (68 kg)     ----------------  Post Discharge Medication Reconciliation Status: discharge medications reconciled and changed, per note/orders    The patient was given a CMS standardized format medication action plan    I spent 45 minutes with this patient today. An extra 15 minutes was spent creating the Medication Action Plan. All changes were made via collaborative practice agreement with Mayito Carlos MD. A copy of the visit note was provided to the patient's provider.     Janelle Fournier, PharmD, BCACP  Medication Management (MTM) Pharmacist  [facility]       Telemedicine Visit Details    Type of service:  Telephone     Start Time: 11:00 am  End Time: 11:45 am    Originating Location (pt. Location): Home    Distant Location (provider location):  [facility]    Mode of Communication: Telephone    Current Outpatient Medications   Medication Sig Dispense Refill     aspirin 325 MG tablet Take 1 tablet (325 mg total) by mouth daily. 30 tablet 0     blood glucose test strips Use 1 each As Directed as needed. Dispense brand per patient's insurance at pharmacy discretion. 100 strip 11     dorzolamide-timolol (COSOPT) 22.3-6.8 mg/mL ophthalmic solution Administer 1 drop to both eyes 2 (two) times a day.  3     generic lancets (ACCU-CHEK FASTCLIX LANCING DEV) Test each day as directed. 30 each 6     latanoprost (XALATAN) 0.005 % ophthalmic solution Administer 1 drop to both eyes at bedtime.       LORazepam (ATIVAN) 0.5 MG tablet Take 0.5 mg by mouth at bedtime as needed.   0     omeprazole (PRILOSEC) 20 MG capsule TAKE 1 CAPSULE(20 MG) BY MOUTH DAILY 90 capsule 3     PARoxetine (PAXIL) 20 MG tablet Take 40 mg by mouth every morning. And 1/2 tablet (10 mg) at night.       PARoxetine (PAXIL) 20 MG tablet Take 10 mg by mouth at bedtime. & 40mg qam       propranolol  (INDERAL) 10 MG tablet Take 10 mg by mouth 2 (two) times a day.       simvastatin (ZOCOR) 20 MG tablet TAKE ONE TABLET BY MOUTH EVERY EVENING 90 tablet 3     tamsulosin (FLOMAX) 0.4 mg cap Take 1 capsule (0.4 mg total) by mouth daily after supper. 30 capsule 0     No current facility-administered medications for this visit.         Medication Therapy Recommendations  Adjustment disorder with anxiety    Current Medication: PARoxetine (PAXIL) 20 MG tablet   Rationale: Does not understand instructions - Adherence - Adherence   Recommendation: Provide Adherence Intervention - Restart paroxetine as prescribed.   Status: Accepted per CPA         Benign prostatic hyperplasia with incomplete bladder emptying    Current Medication: tamsulosin (FLOMAX) 0.4 mg cap   Rationale: Untreated condition - Needs additional medication therapy - Indication   Recommendation: Start Medication - Start tamsulosin as prescribed.   Status: Accepted per CPA         Hip osteoarthritis    Current Medication: gabapentin (NEURONTIN) 100 MG capsule (Discontinued)   Rationale: No medical indication at this time - Unnecessary medication therapy - Indication   Recommendation: Discontinue Medication - Stay off gabapentin.   Status: Accepted per CPA

## 2021-06-17 NOTE — TELEPHONE ENCOUNTER
Noris with [facility] calling     Requesting clarification on asprin dose.  Pt states he is to take 325 MG to take two pills daily.    PT had appt last week with PCP want to confirm change.    [phone]  Noris

## 2021-06-18 NOTE — PROGRESS NOTES
Office Visit - Follow up    Wilfrid Ortze   77 y.o. male    Date of Visit: 5/25/2018    Chief Complaint   Patient presents with     Diabetes     Hyperlipidemia       Subjective: Diabetes mellitus type 2 with hypertension and hyperlipidemia.    Right shoulder pain severe at times.  Seen at [facility] Dr. Hunter.  The patient was diagnosed with bone-on-bone osteoarthritis.  Total shoulder arthroplasty is planned by the patient for fall 2018.    In the meantime uses Naprosyn or ibuprofen.  Listed as an allergy as ibuprofen but it simple the formulation sensitivity GI no rash or hives.    No blood in stool or urine no chest pain or shortness of breath.    Medication list reviewed well-tolerated.    Colonoscopy dated February 25, 2014 normal.  One brother with [place]'s disease.  No anemia receiving transfusional support and will help with Procrit.    ROS: A comprehensive review of systems was performed and was otherwise negative    Medications:  Prior to Admission medications    Medication Sig Start Date End Date Taking? Authorizing Provider   aspirin 325 MG tablet Take 325 mg by mouth daily.   Yes PROVIDER, HISTORICAL   blood glucose test (ACCU-CHEK SMARTVIEW TEST STRIP) strips Test each day as directed. 2/23/18  Yes Mayito Carlos MD   blood-glucose meter Misc Use to test once per day 2/23/18  Yes Mayito Carlos MD   lancets (ACCU-CHEK FASTCLIX) Misc Test each day as directed. 7/28/15  Yes Mayito Carlos MD   latanoprost (XALATAN) 0.005 % ophthalmic solution Administer 1 drop to both eyes at bedtime.   Yes PROVIDER, HISTORICAL   omeprazole (PRILOSEC) 20 MG capsule Take 1 capsule (20 mg total) by mouth daily. 10/3/17  Yes Mayito Carlos MD   PARoxetine (PAXIL) 20 MG tablet Take 40 mg by mouth every morning. For a total dose of 50 mg a day.   Yes PROVIDER, HISTORICAL   simvastatin (ZOCOR) 20 MG tablet TAKE ONE TABLET BY MOUTH EVERY EVENING (PT ANN [id number]-[id number]) 2/23/18  Yes Mayito RO  MD Terrance   timolol maleate (TIMOPTIC) 0.5 % ophthalmic solution Administer 1 drop to both eyes daily.   Yes PROVIDER, HISTORICAL   LORazepam (ATIVAN) 0.5 MG tablet TK 1 T PO QD PRF ANXIETY AND 1-2 TS PO QHS 5/17/18   PROVIDER, HISTORICAL   LORAZEPAM ORAL Take 1 mg by mouth every evening.   5/25/18  PROVIDER, HISTORICAL   omeprazole (PRILOSEC) 20 MG capsule TAKE 1 CAPSULE(20 MG) BY MOUTH DAILY 3/8/18 5/25/18  Mayito Carlos MD       Allergies:   Allergies   Allergen Reactions     Ibuprofen Nausea Only       Immunizations:   Immunization History   Administered Date(s) Administered     DT (pediatric) 04/12/2004     Influenza D6u1-19, 01/15/2010     Influenza high dose, seasonal 10/27/2015, 11/07/2016, 01/04/2018     Influenza, inj, historic,unspecified 10/22/2007, 10/20/2008, 09/15/2009, 09/23/2010, 10/21/2011     Influenza, seasonal,quad inj 6-35 mos 12/10/2012, 09/23/2013, 09/23/2014     Pneumo Conj 13-V (2010&after) 07/28/2015     Pneumo Polysac 23-V 10/20/2008     Td,adult,historic,unspecified 04/12/2004, 01/23/2014     Tdap 01/23/2014     ZOSTER, LIVE 01/01/1900, 06/20/2012       Exam Chest clear to auscultation and percussion.  Heart tones regular rhythm without murmur rub or gallop.  Abdomen soft nontender no organomegaly.  No peritoneal signs.  Extremities free of edema cyanosis or clubbing.  Neck veins nondistended no thyromegaly or scleral icterus noted, carotids full.  Skin warm and dry easily conversant good spirited.  Normal intelligence.  Neurologically intact no gross localizing findings.    Assessment and Plan  Diabetes mellitus type 2 check A1c blood sugar lipid panel today.    Hypertension and hyperlipidemia.  Controlled without target organ damage.    Osteoarthritis severe bone-on-bone right shoulder needs total shoulder arthroplasty by [facility].    Ibuprofen sensitivity with GI upset nausea is able to tolerate naproxen sodium without side effect.  O2 sats today 98%.  RTC 2  months time fasting    Time: total time spent with the patient was 25 minutes of which >50% was spent in counseling and coordination of care    The following high BMI interventions were performed this visit: encouragement to exercise    Mayito Carlos MD    Patient Active Problem List   Diagnosis     Hiatal Hernia     Lyme Disease     Benign Prostatic Hypertrophy     Type 2 Diabetes Mellitus - Uncomplicated, Controlled     Hyperlipidemia     Adjustment Disorder With Anxiety     Basal Cell Carcinoma Of The Skin

## 2021-06-18 NOTE — LETTER
Chief Complaint   Patient presents with   • Hip Pain     through bottom of knee       HISTORY OF PRESENT ILLNESS     56-year-old female here for evaluation right hip/leg pain.  She states that over a month ago she started noticing she was having some right hip pain.  Found it hard to sleep at night when lying on that side.  Some of the pain was worsening with prolonged standing and walking.  Started not alternating between Tylenol and ibuprofen.  Estimates on average day she would end up taking roughly 3000 mg Tylenol and 2400 mg of ibuprofen.  However, on Saturday the pain started to change.  She states that the pain now was traveling down the outside aspect of her right leg towards the side of her knee.  The now it starts to wrap around the knee towards the back.  Describes it as a constant sharp stabbing pain.  Feels like there is pins and needles.  Reports after her last back surgery she does have a numb spot on the outside of her right knee.  Cannot recall any known trauma or injury.  Currently rates the pain a 10/10.  Tried an over-the-counter lidocaine patch without any improvement.      PAST MEDICAL, FAMILY AND SOCIAL HISTORY     The following histories were personally reviewed and updated.  Current medications, Allergies and Problem list    REVIEW OF SYSTEMS     Review of Systems   Constitutional: Negative for chills and fever.   Gastrointestinal:        No bowel incontinence   Genitourinary:        No urine incontinence   Musculoskeletal: Positive for back pain.        Right hip/leg pain   Neurological: Positive for numbness. Negative for weakness.       PHYSICAL EXAM     Visit Vitals  /84   Pulse 84   Resp 16   Ht 5' 4\" (1.626 m)   Wt 117 kg   BMI 44.29 kg/m²       Physical Exam   Constitutional: She is oriented to person, place, and time. She appears well-developed and well-nourished. No distress.   HENT:   Head: Normocephalic and atraumatic.   Musculoskeletal:      Comments: Back:  No tenderness  Letter by Mayito Carlos MD at      Author: Mayito Carlos MD Service: -- Author Type: --    Filed:  Encounter Date: 2/14/2019 Status: (Other)       Wilfrid Ortez  [address]  [place] [address]             February 14, 2019         Dear Mr. Ortez,    Below are the results from your recent visit:    Resulted Orders   Potassium   Result Value Ref Range    Potassium 4.0 3.5 - 5.0 mmol/L   Hemoglobin   Result Value Ref Range    Hemoglobin 13.7 (L) 14.0 - 18.0 g/dL     All very good results      Please call with questions or contact us using [facility].    Sincerely,        Electronically signed by Mayito Carlos MD        over spine.  Minimal right paralumbar muscular tenderness.  Tenderness over right SI joint.  Sitting/lying straight leg test negative.    Right hip:  No tenderness to palpation over greater trochanteric bursa. GARDENIA/FADIR negative.  Edwadr's positive.   Neurological: She is alert and oriented to person, place, and time.   Skin: She is not diaphoretic.   Nursing note and vitals reviewed.      ASSESSMENT/PLAN     Sandra was seen today for hip pain.    Diagnoses and all orders for this visit:    Right leg pain with SI (sacroiliac) joint dysfunction  · Discussed with her that her examination shows tightness along the iliotibial band which may be contributing to part of her pain.  However, with the pins and needles sensation and tenderness over the SI joint there may be a muscle spasm that is catching a nerve  · Therefore, recommend that she try using a muscle relaxer in the evening and if it does not make her sleepy then she can use it during the day if needed.  · For the possible nerve pain will also provide her with gabapentin.  She understands the gabapentin may make her sleepy and groggy and so if that is the case then just to use it at night.  Can start at 100 mg nightly and titrate up to 300 mg nightly as needed  · Encouraged to continue to alternate between Tylenol and ibuprofen.  Discussed the maximum doses of both medications  · She understands that for severe pain will provide her with a small prescription for Norco  · Provided her with home exercises for SI joint dysfunction and IT band syndrome  · She understands that if she is not starting to improve over the next week to notify me and I would place an order for formal physical therapy and consider imaging  · gabapentin (NEURONTIN) 100 MG capsule; Take 1 capsule by mouth 3 times daily.  · cyclobenzaprine (FLEXERIL) 5 MG tablet; Take 1 tablet by mouth 3 times daily as needed for Muscle spasms.  · [place] PDMP was checked.  Last refill of Norco given 7/6/20 for a  quantity of 9.  Red flags none.  · HYDROcodone-acetaminophen (NORCO) 5-325 MG per tablet; Take 1 tablet by mouth every 8 hours as needed for Pain.  · Discussed alternating between heat

## 2021-06-19 NOTE — LETTER
Letter by Mayito Carlos MD at      Author: Mayito Carlos MD Service: -- Author Type: --    Filed:  Encounter Date: 4/1/2019 Status: (Other)         Wilfrid Ortez  [address]  [place] [address]             April 1, 2019         Dear Mr. Ortez,    Below are the results from your recent visit:    Resulted Orders   Glycosylated Hemoglobin A1c   Result Value Ref Range    Hemoglobin A1c 6.6 (H) 3.5 - 6.0 %   Glucose   Result Value Ref Range    Glucose 153 (H) 70 - 125 mg/dL    Patient Fasting > 8hrs? Yes     Narrative    Fasting Glucose reference range is 70-99 mg/dL per  American Diabetes Association (ADA) guidelines.   Lipid Cascade   Result Value Ref Range    Cholesterol 150 <=199 mg/dL    Triglycerides 330 (H) <=149 mg/dL    HDL Cholesterol 40 >=40 mg/dL    LDL Calculated 44 <=129 mg/dL    Patient Fasting > 8hrs? Yes    Hemoglobin   Result Value Ref Range    Hemoglobin 14.7 14.0 - 18.0 g/dL   PSA (Prostatic-Specific Antigen), Annual Screen   Result Value Ref Range    PSA 3.3 0.0 - 6.5 ng/mL    Narrative    Method is Abbott Prostate-Specific Antigen (PSA)  Standard-WHO 1st International (90:10)       All very good results but some elevation in triglycerides is known and dietary measures should help this.  There has been a rise in the PSA but it was 4 years ago when it was in the 1 range.  His absolute value now is still normal.  Please show these results to [facility]ist Dr. ROXY Potter.    Please call with questions or contact us using [employer].    Sincerely,        Electronically signed by Mayito Carlos MD

## 2021-06-19 NOTE — LETTER
Letter by Mayito Carlos MD at      Author: Mayito Carlos MD Service: -- Author Type: --    Filed:  Encounter Date: 10/21/2019 Status: Signed         Wilfrid Ortez  [address]  [place] [address]             October 21, 2019         Dear Mr. Ortez,    Below are the results from your recent visit:    Resulted Orders   Glycosylated Hemoglobin A1c   Result Value Ref Range    Hemoglobin A1c 6.9 (H) 3.5 - 6.0 %   Glucose   Result Value Ref Range    Glucose 132 (H) 70 - 125 mg/dL    Patient Fasting > 8hrs? Yes     Narrative    Fasting Glucose reference range is 70-99 mg/dL per  American Diabetes Association (ADA) guidelines.   Lipid Cascade   Result Value Ref Range    Cholesterol 157 <=199 mg/dL    Triglycerides 97 <=149 mg/dL    HDL Cholesterol 56 >=40 mg/dL    LDL Calculated 82 <=129 mg/dL    Patient Fasting > 8hrs? Yes    Microalbumin, Random Urine   Result Value Ref Range    Microalbumin, Random Urine 0.86 0.00 - 1.99 mg/dL    Creatinine, Urine 152.6 mg/dL    Microalbumin/Creatinine Ratio Random Urine 5.6 <=19.9 mg/g    Narrative    Microalbumin, Random Urine  <2.0 mg/dL . . . . . . . . Normal  3.0-30.0 mg/dL . . . . . . Microalbuminuria  >30.0 mg/dL . . . . . .  . Clinical Proteinuria    Microalbumin/Creatinine Ratio, Random Urine  <20 mg/g . . . . .. . . . Normal   mg/g . . . . . . . Microalbuminuria  >300 mg/g . . . . . . . . Clinical Proteinuria           All very good results    Please call with questions or contact us using [employer]ulin.    Sincerely,        Electronically signed by Mayito Carlos MD

## 2021-06-19 NOTE — PROGRESS NOTES
Office Visit - Follow up    Wilfrid Ortez   77 y.o. male    Date of Visit: 7/26/2018    Chief Complaint   Patient presents with     Diabetes     Hyperlipidemia       Subjective: Diabetes mellitus type 2.  Blood sugars in the morning 150.    Check blood sugars at least once a week.    Hyperlipidemia as well.  Spots on face one the cyst appears infected left maxillary area previous surgery for same with incision and drainage.  With dermatology consultants.  Suggest return therefore I&D and will add doxycycline see below.    No blood in stool or urine no chest pain or shortness of breath medication list reviewed well-tolerated.  Not fasting for today's appointment.  Recent labs from May 25, 2018 reviewed in their entirety.    Normal colonoscopy dated February 25, 2014 with Dr. Bonnie Langley presiding of colorectal surgery group.    ROS: A comprehensive review of systems was performed and was otherwise negative    Medications:  Prior to Admission medications    Medication Sig Start Date End Date Taking? Authorizing Provider   aspirin 325 MG tablet Take 325 mg by mouth daily.   Yes PROVIDER, HISTORICAL   blood glucose test (ACCU-CHEK SMARTVIEW TEST STRIP) strips Test each day as directed. 2/23/18  Yes Mayito Carlos MD   blood-glucose meter Misc Use to test once per day 2/23/18  Yes Mayito Carlos MD   lancets (ACCU-CHEK FASTCLIX) Misc Test each day as directed. 7/28/15  Yes Mayito Carlos MD   LORazepam (ATIVAN) 0.5 MG tablet TK 1 T PO QD PRF ANXIETY AND 1-2 TS PO QHS 5/17/18  Yes PROVIDER, HISTORICAL   omeprazole (PRILOSEC) 20 MG capsule Take 1 capsule (20 mg total) by mouth daily. 10/3/17  Yes Mayito Carlos MD   PARoxetine (PAXIL) 20 MG tablet Take 40 mg by mouth every morning. For a total dose of 50 mg a day.   Yes PROVIDER, HISTORICAL   simvastatin (ZOCOR) 20 MG tablet TAKE ONE TABLET BY MOUTH EVERY EVENING (PT ANN [address]-KP) 2/23/18  Yes Mayito Carlos MD   dorzolamide-timolol  (COSOPT) 22.3-6.8 mg/mL ophthalmic solution APPLY 1 DROP IN BOTH EYES BID 6/13/18   PROVIDER, HISTORICAL   doxycycline (VIBRA-TABS) 100 MG tablet Take 1 tablet (100 mg total) by mouth 2 (two) times a day for 10 days. 7/26/18 8/5/18  Mayito Carlos MD   latanoprost (XALATAN) 0.005 % ophthalmic solution Administer 1 drop to both eyes at bedtime.    PROVIDER, HISTORICAL   timolol maleate (TIMOPTIC) 0.5 % ophthalmic solution Administer 1 drop to both eyes daily.  7/26/18  PROVIDER, HISTORICAL       Allergies:   Allergies   Allergen Reactions     Ibuprofen Nausea Only       Immunizations:   Immunization History   Administered Date(s) Administered     DT (pediatric) 04/12/2004     Influenza [id number], 01/15/2010     Influenza high dose, seasonal 10/27/2015, 11/07/2016, 01/04/2018     Influenza, inj, historic,unspecified 10/22/2007, 10/20/2008, 09/15/2009, 09/23/2010, 10/21/2011     Influenza, seasonal,quad inj 6-35 mos 12/10/2012, 09/23/2013, 09/23/2014     Pneumo Conj 13-V (2010&after) 07/28/2015     Pneumo Polysac 23-V 10/20/2008     Td,adult,historic,unspecified 04/12/2004, 01/23/2014     Tdap 01/23/2014     ZOSTER, LIVE 01/01/1900, 06/20/2012       Exam Chest clear to auscultation and percussion.  Heart tones regular rhythm without murmur rub or gallop.  Abdomen soft nontender no organomegaly.  No peritoneal signs.  Extremities free of edema cyanosis or clubbing.  Neck veins nondistended no thyromegaly or scleral icterus noted, carotids full.  Skin warm and dry easily conversant good spirited.  Normal intelligence.  Neurologically intact no gross localizing findings.  Sebaceous cyst rule out basal cell skin cancer left maxillary face area.  Dermatology consult suggested again with dermatology consultants Dr. DIAS.  Doxycycline in the meantime 100 mg once or twice daily for 20 tablet dosing.    Assessment and Plan  Diabetes mellitus type 2 we will check fasting labs with next office visit 1 month.  To include blood  sugar A1c lipid panel.    Sebaceous cyst left face minimally inflamed.  Try doxycycline with hot packs 100 mg twice daily.  Suggest dermatology reconsultation with Dr. DIAS at [facility].    Hyperlipidemia on statin therapy no history of MI or stroke.    Ibuprofen allergy.    Time: total time spent with the patient was 25 minutes of which >50% was spent in counseling and coordination of care    The following high BMI interventions were performed this visit: encouragement to exercise    Mayito Carlos MD    Patient Active Problem List   Diagnosis     Hiatal Hernia     Lyme Disease     Benign Prostatic Hypertrophy     Type 2 Diabetes Mellitus - Uncomplicated, Controlled     Hyperlipidemia     Adjustment Disorder With Anxiety     Basal Cell Carcinoma Of The Skin

## 2021-06-20 NOTE — LETTER
Letter by Mayito Carlos MD at      Author: Mayito Carlos MD Service: -- Author Type: --    Filed:  Encounter Date: 1/28/2020 Status: (Other)         January 28, 2020     Patient: Wilfrid Ortez   YOB: 1940           To Whom It May Concern:      Wilfrid Ponce has been under my care for many years. Due to his disability and treatments please waive his life insurance premiums.      If you have any questions or concerns, please don't hesitate to call.    Sincerely,        Electronically signed by Mayito Carlos MD

## 2021-06-20 NOTE — LETTER
Letter by Mayito Carlos MD at      Author: Mayito Carlos MD Service: -- Author Type: --    Filed:  Encounter Date: 7/23/2020 Status: (Other)         Wilfrid Ortez  [address]  [place] [address]             July 23, 2020         Dear Mr. Ortez,    Below are the results from your recent visit:    Resulted Orders   COVID-19 Virus (Coronavirus) Antibody & Titer Reflex   Result Value Ref Range    COVID-19 Antibody Screen Negative       Comment:      No COVID-19 antibodies detected.  Patients within 10 days of symptom onset for  COVID-19 may not produce sufficient levels of detectable antibodies.  Immunocompromised COVID-19 patients may take longer to develop antibodies.    COVID-19 IgG Titer Not Applicable       Comment:      Qualitative screen for total antibodies to COVID-19 (SARS-CoV-2) with  semi-quantitative measurement of IgG COVID-19 antibodies by endpoint titer.  COVID-19 antibodies may be elevated due to a past or current infection.  Negative results do not rule out COVID-19 infection.  Results from antibody  testing should not be used as the sole basis to diagnose or exclude SARS-CoV-2  infection or to inform infection status.  COVID-19 PCR test should be ordered  if current infection is suspected.  False positive results may occur in rare  cases due to cross-reacting antibodies.  This test was developed and its performance characteristics determined by the  [facility] ([facility]),  which is regulated under CLIA as qualified to perform high-complexity testing.  This test has not been reviewed by the FDA.  Testing performed by [facility], [facility], [address], [address], [place]   [address]   Glycosylated Hemoglobin A1c   Result Value Ref Range    Hemoglobin A1c 6.5 (H) 3.5 - 6.0 %   Glucose   Result Value Ref Range    Glucose 189 (H) 70 - 125 mg/dL    Patient  Fasting > 8hrs? Yes     Narrative    Fasting Glucose reference range is 70-99 mg/dL per  American Diabetes Association (ADA) guidelines.   Microalbumin, Random Urine   Result Value Ref Range    Microalbumin, Random Urine 1.06 0.00 - 1.99 mg/dL    Creatinine, Urine 128.9 mg/dL    Microalbumin/Creatinine Ratio Random Urine 8.2 <=19.9 mg/g    Narrative    Microalbumin, Random Urine  <2.0 mg/dL . . . . . . . . Normal  3.0-30.0 mg/dL . . . . . . Microalbuminuria  >30.0 mg/dL . . . . . .  . Clinical Proteinuria    Microalbumin/Creatinine Ratio, Random Urine  <20 mg/g . . . . .. . . . Normal   mg/g . . . . . . . Microalbuminuria  >300 mg/g . . . . . . . . Clinical Proteinuria       Lipid Cascade   Result Value Ref Range    Cholesterol 182 <=199 mg/dL    Triglycerides 88 <=149 mg/dL    HDL Cholesterol 51 >=40 mg/dL    LDL Calculated 113 <=129 mg/dL    Patient Fasting > 8hrs? Yes        All very good results     Please call with questions or contact us using [employer].    Sincerely,        Electronically signed by Mayito Carlos MD

## 2021-06-20 NOTE — LETTER
Letter by Janelle Fournier PharmD at      Author: Janelle Fournier PharmD Service: -- Author Type: --    Filed:  Encounter Date: 2020 Status: (Other)             2020      Wilfrid Ortez  [address]  [place] [address]            Dear Wilfrid Ortez     Thank you for talking with me on 20 about your health and medications. [facility] MTM (Medication Therapy Management) program helps you understand your medications and use them safely.    Along with this letter are an action plan (Medication Action Plan) and a medication list (Personal Medication List). The action plan has steps you should take to help you get the best results from your medications. The medication list will help you keep track of your medications and how to use them the right way.       Have your action plan and medication list with you when you talk with your doctors, pharmacists, and other health care providers.    Ask your doctors, pharmacists, and other healthcare providers to update them at every visit.     Take your medication list with you if you go to the hospital or emergency room.     Give a copy of the action plan and medication list to your family or caregivers.     If you want to talk about this letter or any of the papers with it, please call [phone]. We look forward to working with you, your doctors, and other healthcare providers to help you stay healthy.    Sincerely,   Janelle Fournier, PharmD    _  Medication Action Plan For Wilfrid Jeantori, : 1940     This action plan will help you get the best results from your medications if you:     1. Read What we talked about.   2. Take the steps listed in the What I need to do boxes.   3. Fill in What I did and when I did it.   4. Fill in My follow-up plan and Questions I want to ask.     Have this action plan with you when you talk with your doctors, pharmacists, and other healthcare providers in your care team. Share this with your family or  caregivers too.    Date Prepared: 8/17/2020      What we talked about:  Medication for tremor     What I need to do:  Talk with your neurologist about increasing dose of propranolol for tremor.    What I did and when I did it:          What we talked about:  Gabapentin possibly causing daytime sleepiness.      What I need to do:  Try decreasing morning gabapentin dose to 100-200 mg to see if you are less tired during the day. Continue 300 mg at bedtime.    What I did and when I did it:          What we talked about:  What my medicines are for, how to know if my medicines are working, made sure my medicines are safe for me and reviewed how to take my medicines.      What I need to do:  Take my medicines every day.      What I did and when I did it:         My follow-up plan (add notes about next steps):            Questions I want to ask (include topics about medications or therapy):          If you have any questions about your action plan, call Janelle Fournier at [phone], Monday-Friday 8:00-4:30pm  _  This medication list was made for you after we talked. We also used information from your doctors chart.      Use blank rows to add new medications. Then fill in the dates you started using them.     Cross out medications when you no longer use them. Then write the date and why you stopped using them.     Ask your doctors, pharmacists, and other healthcare providers to update this list at every visit.  Keep this list up-to-date with:  ? prescription medications  ? over the counter drugs  ? herbals  ? vitamins  ? minerals          If you go to the hospital or emergency room, take this list with you. Share this with your family or caregivers too.    Date Prepared: 8/17/2020      Allergies or side effects: ibuprofen      Medication: aspirin 325 MG tablet      How I use it: Take 325 mg by mouth daily.      Why I use it: Heart health    Prescriber: Mayito Carlos MD      Date I started using it:     Date I stopped  using it:       Why I stopped using it:          Medication: blood glucose test strips      How I use it: Use 1 each As Directed daily as needed.       Why I use it: Type 2 diabetes     Prescriber: Mayito Carlos MD      Date I started using it:     Date I stopped using it:       Why I stopped using it:          Medication: dorzolamide-timolol (COSOPT) 22.3-6.8 mg/mL ophthalmic solution      How I use it: Administer 1 drop to both eyes 2 (two) times a day.      Why I use it: Glaucoma    Prescriber: Mayito Carlos MD      Date I started using it:     Date I stopped using it:       Why I stopped using it:          Medication: gabapentin (NEURONTIN) 100 MG capsule      How I use it: Take 300 mg by mouth 2 (two) times a day.      Why I use it: Pain    Prescriber: Mayito Carlos MD      Date I started using it:     Date I stopped using it:       Why I stopped using it:          Medication: generic lancets (ACCU-CHEK FASTCLIX LANCING DEV)      How I use it: Test each day as directed.      Why I use it: Diabetes Mellitus    Prescriber: Mayito Carlos MD      Date I started using it:     Date I stopped using it:       Why I stopped using it:          Medication: latanoprost (XALATAN) 0.005 % ophthalmic solution      How I use it: Administer 1 drop to both eyes at bedtime.      Why I use it: Glaucoma    Prescriber: Mayito Carlos MD      Date I started using it:     Date I stopped using it:       Why I stopped using it:          Medication: LORazepam (ATIVAN) 0.5 MG tablet      How I use it: Take 0.5 mg by mouth at bedtime.      Why I use it: Insomnnia    Prescriber: Peace Butler MD      Date I started using it:     Date I stopped using it:       Why I stopped using it:          Medication: omeprazole (PRILOSEC) 20 MG capsule      How I use it: TAKE 1 CAPSULE(20 MG) BY MOUTH DAILY      Why I use it: Heartburn; hiatal hernia    Prescriber: Mayito Carlos MD      Date I started using it:     Date I  stopped using it:       Why I stopped using it:          Medication: PARoxetine (PAXIL) 20 MG tablet      How I use it: Take 40 mg by mouth every morning. And 1/2 tablet (10 mg) at night. For a total dose of 50 mg a day.      Why I use it: Anxiety    Prescriber: Peace Butler MD      Date I started using it:     Date I stopped using it:       Why I stopped using it:          Medication: propranolol (INDERAL) 10 MG tablet      How I use it: Take 10 mg by mouth 2 (two) times a day.      Why I use it: Tremor    Prescriber: Mayito Carlos MD      Date I started using it:     Date I stopped using it:       Why I stopped using it:          Medication: simvastatin (ZOCOR) 20 MG tablet      How I use it: TAKE ONE TABLET BY MOUTH EVERY EVENING      Why I use it: High cholesterol     Prescriber: Mayito Carlos MD      Date I started using it:     Date I stopped using it:       Why I stopped using it:          Medication:       How I use it:       Why I use it:     Prescriber:       Date I started using it:     Date I stopped using it:       Why I stopped using it:          Medication:       How I use it:       Why I use it:     Prescriber:       Date I started using it:     Date I stopped using it:       Why I stopped using it:          Medication:       How I use it:       Why I use it:     Prescriber:       Date I started using it:     Date I stopped using it:       Why I stopped using it:          Other Information:      If you have any questions about your medication list, call [phone]    According to the Paperwork Reduction Act of 1995, no persons are required to respond to a collection of information unless it displays a valid OMB control number. The valid B number for this information collection is 2712-9666. The time required to complete this information collection is estimated to average 37.76 minutes per response, including the time to review instructions, searching existing data resources, gather the  data needed, and complete and review the information collection. If you have any comments concerning the accuracy of the time estimate(s) or suggestions for improving this form, please write to: CMS, Attn: CATIE Reports Clearance Officer, [address] [place] [address], [place], [place] [address].

## 2021-06-20 NOTE — LETTER
Letter by Mayito Carlos MD at      Author: Mayito Carlos MD Service: -- Author Type: --    Filed:  Encounter Date: 1/20/2020 Status: Signed         Wilfrid Ortez  [address]  [place] [address]             January 20, 2020         Dear Mr. Ortez,    Below are the results from your recent visit:    Resulted Orders   Glycosylated Hemoglobin A1c   Result Value Ref Range    Hemoglobin A1c 6.7 (H) 3.5 - 6.0 %   Glucose   Result Value Ref Range    Glucose 154 (H) 70 - 125 mg/dL    Patient Fasting > 8hrs? Yes     Narrative    Fasting Glucose reference range is 70-99 mg/dL per  American Diabetes Association (ADA) guidelines.   Microalbumin, Random Urine   Result Value Ref Range    Microalbumin, Random Urine 1.04 0.00 - 1.99 mg/dL    Creatinine, Urine 114.1 mg/dL    Microalbumin/Creatinine Ratio Random Urine 9.1 <=19.9 mg/g    Narrative    Microalbumin, Random Urine  <2.0 mg/dL . . . . . . . . Normal  3.0-30.0 mg/dL . . . . . . Microalbuminuria  >30.0 mg/dL . . . . . .  . Clinical Proteinuria    Microalbumin/Creatinine Ratio, Random Urine  <20 mg/g . . . . .. . . . Normal   mg/g . . . . . . . Microalbuminuria  >300 mg/g . . . . . . . . Clinical Proteinuria       Lipid Cascade   Result Value Ref Range    Cholesterol 166 <=199 mg/dL    Triglycerides 115 <=149 mg/dL    HDL Cholesterol 52 >=40 mg/dL    LDL Calculated 91 <=129 mg/dL    Patient Fasting > 8hrs? Yes        All very good results    Please call with questions or contact us using [employer].    Sincerely,        Electronically signed by Mayito Carlos MD

## 2021-06-20 NOTE — PROGRESS NOTES
"Office Visit - Follow up    Wilfrid Ortez   77 y.o. male    Date of Visit: 8/30/2018    Chief Complaint   Patient presents with     Diabetes     fasting     Hyperlipidemia       Subjective: Diabetes mellitus type 2 with hyperlipidemia and rash.  I suggested dermatology reconsultation with dermatology consultants.  The patient did have some past service come to his apartment because of potential insect bites.  Spider.    He is sexually active with a male who is HIV negative himself the patient is HIV negative.  The patient's partner however is active sexually with other males who are HIV positive.  I cautioned patient.    No blood in stool or urine no chest pain or shortness of breath medication list reviewed well-tolerated.    \"Colonoscopy dated February 25, 2014 with Dr. Bonnie Langely presiding all normal.    ROS: A comprehensive review of systems was performed and was otherwise negative    Medications:  Prior to Admission medications    Medication Sig Start Date End Date Taking? Authorizing Provider   aspirin 325 MG tablet Take 325 mg by mouth daily.   Yes PROVIDER, HISTORICAL   blood-glucose meter Misc Use to test once per day 2/23/18  Yes Mayito Carlos MD   dorzolamide-timolol (COSOPT) 22.3-6.8 mg/mL ophthalmic solution APPLY 1 DROP IN BOTH EYES BID 6/13/18  Yes PROVIDER, HISTORICAL   latanoprost (XALATAN) 0.005 % ophthalmic solution Administer 1 drop to both eyes at bedtime.   Yes PROVIDER, HISTORICAL   LORazepam (ATIVAN) 0.5 MG tablet TK 1 T PO QD PRF ANXIETY AND 1-2 TS PO QHS 5/17/18  Yes PROVIDER, HISTORICAL   omeprazole (PRILOSEC) 20 MG capsule Take 1 capsule (20 mg total) by mouth daily. 10/3/17  Yes Mayito Carlos MD   PARoxetine (PAXIL) 20 MG tablet Take 40 mg by mouth every morning. For a total dose of 50 mg a day.   Yes PROVIDER, HISTORICAL   propranolol (INDERAL) 10 MG tablet Take 10-20 mg by mouth. 6/15/18  Yes PROVIDER, HISTORICAL   blood glucose test (ACCU-CHEK SMARTVIEW TEST STRIP) " strips Test each day as directed. 2/23/18   Mayito Carlos MD   lancets (ACCU-CHEK FASTCLIX) Misc Test each day as directed. 7/28/15   Mayito Carlos MD   simvastatin (ZOCOR) 20 MG tablet TAKE ONE TABLET BY MOUTH EVERY EVENING (PT ANN [address]-VX) 2/23/18   Mayito Carlos MD       Allergies:   Allergies   Allergen Reactions     Ibuprofen Nausea Only       Immunizations:   Immunization History   Administered Date(s) Administered     DT (pediatric) 04/12/2004     Influenza [id number], 01/15/2010     Influenza high dose, seasonal 10/27/2015, 11/07/2016, 01/04/2018     Influenza, inj, historic,unspecified 10/22/2007, 10/20/2008, 09/15/2009, 09/23/2010, 10/21/2011     Influenza, seasonal,quad inj 6-35 mos 12/10/2012, 09/23/2013, 09/23/2014     Pneumo Conj 13-V (2010&after) 07/28/2015     Pneumo Polysac 23-V 10/20/2008     Td,adult,historic,unspecified 04/12/2004, 01/23/2014     Tdap 01/23/2014     ZOSTER, LIVE 01/01/1900, 06/20/2012       Exam Chest clear to auscultation and percussion.  Heart tones regular rhythm without murmur rub or gallop.  Abdomen soft nontender no organomegaly.  No peritoneal signs.  Extremities free of edema cyanosis or clubbing.  Neck veins nondistended no thyromegaly or scleral icterus noted, carotids full.  Skin warm and dry easily conversant good spirited.  Normal intelligence.  Neurologically intact no gross localizing findings.  Insect bites with intense itching.    Assessment and Plan  Diabetes mellitus type 2 check A1c blood sugar lipid panel today.  Stable.  BMI 25.66 O2 sats 98% respiratory rate 18 and unlabored pulse 75.  Weight down 2 pounds.    Insect bites with rash suggest calamine lotion and/or Cortaid topically over-the-counter plus Benadryl 25 mg twice daily.  Suggest formal reconsultation with dermatology consultants.    Hyperlipidemia on statin therapy no history of MI or stroke.    Unsafe sexual practices with male who is HIV negative reportedly.  Discussed in  detail.  And cautioned against this.  RTC 2 months time for HIV antibody testing plus diabetic labs.  Suggest dermatology consult or reconsultation in the meantime.  Previously the patient has been HIV negative repeatedly.  May require infectious disease consultation regarding unsafe homosexual sexual practices.    Time: total time spent with the patient was 25 minutes of which >50% was spent in counseling and coordination of care        Mayito Carlos MD    Patient Active Problem List   Diagnosis     Hiatal Hernia     Lyme Disease     Benign Prostatic Hypertrophy     Type 2 Diabetes Mellitus - Uncomplicated, Controlled     Hyperlipidemia     Adjustment Disorder With Anxiety     Basal Cell Carcinoma Of The Skin

## 2021-06-21 NOTE — LETTER
Letter by Janelle Fournier, PharmD at      Author: Janelle Fournier, Reny Service: -- Author Type: --    Filed:  Encounter Date: 5/10/2021 Status: (Other)           5/10/2021    Wilfrid Ortez  [address]  [place] [address]        Dear Wilfrid Ortez     Thank you for talking with me on 05/10/21 about your health and medications. [facility] MTM (Medication Therapy Management) program helps you understand your medications and use them safely.      This letter includes an action plan (Medication Action Plan) and medication list (Personal Medication List). The action plan has steps you should take to help you get the best results from your medications. The medication list will help you keep track of your medications and how to use them the right way.       Have your action plan and medication list with you when you talk with your doctors, pharmacists, and other healthcare providers in your care team.     Ask your doctors, pharmacists, and other healthcare providers to update the action plan and medication list at every visit.     Take your medication list with you if you go to the hospital or emergency room.     Give a copy of the action plan and medication list to your family or caregivers.     If you want to talk about this letter or any of the papers with it, please call   [phone].   We look forward to working with you, your doctors, and other healthcare providers to help you stay healthy through the [facility] MTM program.    Sincerely,    Janelle Fournier    Enclosed: Medication Action Plan and Personal Medication List  _  Medication Action Plan For Wilfrid Ortez, : 1940     This action plan will help you get the best results from your medications if you:     1. Read What we talked about.   2. Take the steps listed in the What I need to do boxes.   3. Fill in What I did and when I did it.   4. Fill in My follow-up plan and Questions I want to ask.     Have this action plan with  you when you talk with your doctors, pharmacists, and other healthcare providers in your care team. Share this with your family or caregivers too.    Date Prepared: 5/10/2021      What we talked about: Restarting paroxetine for anxiety.                                                  What I need to do: Take paroxetine 40 mg every morning and 10 mg every evening.    What I did and when I did it:                                              What we talked about: Staying off gabapentin since you are no longer having any pain.                                                What I need to do: Stay off gabapentin.      What I did and when I did it:                                                What we talked about: Starting tamsulosin for urinary retention.                                                What I need to do: Start tamsulosin 0.4 mg every evening.        What I did and when I did it:                                                My follow-up plan:                 Questions I want to ask:              If you have any questions about your action plan, call Janelle Fournier, Phone: [phone] , Monday-Friday 8-4:30pm.           PERSONAL MEDICATION LIST FOR Wilfrid BOWMAN Neelima, SHARON 1940     This medication list was made for you after we talked. We also used information from your doctor's chart.      Use blank rows to add new medications. Then fill in the dates you started using them.    Cross out medications when you no longer use them. Then write the date and why you stopped using them.    Ask your doctors, pharmacists, and other healthcare providers to update this list at every visit. Keep this list up-to-date with:       Prescription medications    Over the counter drugs     Herbals    Vitamins    Minerals      If you go to the hospital or emergency room, take this list with you. Share this with your family or caregivers too.     DATE PREPARED: 5/10/2021    Allergies or side effects: ibuprofen       Medication: aspirin 325 MG tablet      How I use it: Take 1 tablet (325 mg total) by mouth daily.      Why I use it: Heart health; blood thinner    Prescriber: Mayito Carlos MD      Date I started using it:     Date I stopped using it:       Why I stopped using it:             Medication: dorzolamide-timolol (COSOPT) 22.3-6.8 mg/mL ophthalmic solution      How I use it: Administer 1 drop to both eyes 2 (two) times a day.      Why I use it: Glaucoma    Prescriber: Mayito Carlos MD      Date I started using it:     Date I stopped using it:       Why I stopped using it:          Medication: latanoprost (XALATAN) 0.005 % ophthalmic solution      How I use it: Administer 1 drop to both eyes at bedtime.      Why I use it: Glaucoma    Prescriber: Mayito Carlos MD      Date I started using it:     Date I stopped using it:       Why I stopped using it:          Medication: LORazepam (ATIVAN) 0.5 MG tablet      How I use it: Take 0.5 mg by mouth at bedtime as needed.       Why I use it: Anxiety; insomnia    Prescriber: Mayito Carlos MD      Date I started using it:     Date I stopped using it:       Why I stopped using it:          Medication: omeprazole (PRILOSEC) 20 MG capsule      How I use it: TAKE 1 CAPSULE(20 MG) BY MOUTH DAILY      Why I use it: Heartburn    Prescriber: Mayito Carlos MD      Date I started using it:     Date I stopped using it:       Why I stopped using it:          Medication: PARoxetine (PAXIL) 20 MG tablet      How I use it: Take 2 tablets (40 mg) by mouth every morning and 1/2 tablet (10 mg) at night.      Why I use it: Anxiety    Prescriber: Mayito Carlos MD      Date I started using it:     Date I stopped using it:       Why I stopped using it:          Medication: propranolol (INDERAL) 10 MG tablet      How I use it: Take 10 mg by mouth 2 (two) times a day.      Why I use it: Tremor    Prescriber: Mayito Carlos MD      Date I started using it:     Date  I stopped using it:       Why I stopped using it:          Medication: simvastatin (ZOCOR) 20 MG tablet      How I use it: TAKE ONE TABLET BY MOUTH EVERY EVENING      Why I use it: Cholesterol     Prescriber: Mayito Carlos MD      Date I started using it:     Date I stopped using it:       Why I stopped using it:          Medication: tamsulosin (FLOMAX) 0.4 mg cap      How I use it: Take 1 capsule (0.4 mg total) by mouth daily after supper.      Why I use it: BPH with incomplete bladder emptying    Prescriber: Kat Vazquez MD      Date I started using it:     Date I stopped using it:       Why I stopped using it:          Medication:       How I use it:       Why I use it:     Prescriber:       Date I started using it:     Date I stopped using it:       Why I stopped using it:          Medication:       How I use it:       Why I use it:     Prescriber:       Date I started using it:     Date I stopped using it:       Why I stopped using it:          Medication:       How I use it:       Why I use it:     Prescriber:       Date I started using it:     Date I stopped using it:       Why I stopped using it:          Other Information:           If you have any questions about your medication list, call Janelle Fournier, Phone: [phone] , Monday-Friday 8-4:30pm.    According to the Paperwork Reduction Act of 1995, no persons are required to respond to a collection of information unless it displays a valid OMB control number. The valid OMB number for this information collection is [id number]. The time required to complete this information collection is estimated to average 40 minutes per response, including the time to review instructions, searching existing data resources, gather the data needed, and complete and review the information collection. If you have any comments concerning the accuracy of the time estimate(s) or suggestions for improving this form, please write to: CMS, Attn: CATIE Reports Clearance  Officer, [address] [place] [address], [place], [place] [address].

## 2021-06-21 NOTE — PROGRESS NOTES
Office Visit - Follow up    Wilfrid Ortez   77 y.o. male    Date of Visit: 10/30/2018    Chief Complaint   Patient presents with     Diabetes     Hyperlipidemia       Subjective: High risk homosexual behavior in 77-year-old diabetic with history of hyperlipidemia.    Allergy ibuprofen.    Last lab test done for diabetes mellitus type 2 on August 30, 2018.    Flu shot today.  Colonoscopy normal on February 25, 2014.  Dr. Bonnie Langley presiding.  No blood in stool or urine no chest pain or shortness of breath no syncope or palpitations.  Patient generally feels quite well medication list reviewed well-tolerated.  Ibuprofen listed as an allergy.    ROS: A comprehensive review of systems was performed and was otherwise negative    Medications:  Prior to Admission medications    Medication Sig Start Date End Date Taking? Authorizing Provider   aspirin 325 MG tablet Take 325 mg by mouth daily.   Yes PROVIDER, HISTORICAL   blood glucose test (ACCU-CHEK SMARTVIEW TEST STRIP) strips Test each day as directed. 2/23/18  Yes Mayito Carlos MD   blood-glucose meter Misc Use to test once per day 2/23/18  Yes Mayito Carlos MD   dorzolamide-timolol (COSOPT) 22.3-6.8 mg/mL ophthalmic solution APPLY 1 DROP IN BOTH EYES BID 6/13/18  Yes PROVIDER, HISTORICAL   latanoprost (XALATAN) 0.005 % ophthalmic solution Administer 1 drop to both eyes at bedtime.   Yes PROVIDER, HISTORICAL   LORazepam (ATIVAN) 0.5 MG tablet TK 1 T PO QD PRF ANXIETY AND 1-2 TS PO QHS 5/17/18  Yes PROVIDER, HISTORICAL   omeprazole (PRILOSEC) 20 MG capsule Take 1 capsule (20 mg total) by mouth daily. 10/3/17  Yes Mayito Carlos MD   PARoxetine (PAXIL) 20 MG tablet Take 40 mg by mouth every morning. For a total dose of 50 mg a day.   Yes PROVIDER, HISTORICAL   lancets (ACCU-CHEK FASTCLIX) Misc Test each day as directed. 7/28/15   Mayito Carlos MD   propranolol (INDERAL) 10 MG tablet Take 10-20 mg by mouth. 6/15/18   PROVIDER, HISTORICAL    simvastatin (ZOCOR) 20 MG tablet TAKE ONE TABLET BY MOUTH EVERY EVENING (PT ANN [address]-CO) 2/23/18   Mayito Carlos MD       Allergies:   Allergies   Allergen Reactions     Ibuprofen Nausea Only       Immunizations:   Immunization History   Administered Date(s) Administered     DT (pediatric) 04/12/2004     Influenza [id number], 01/15/2010     Influenza high dose, seasonal 10/27/2015, 11/07/2016, 01/04/2018, 10/30/2018     Influenza, inj, historic,unspecified 10/22/2007, 10/20/2008, 09/15/2009, 09/23/2010, 10/21/2011     Influenza, seasonal,quad inj 6-35 mos 12/10/2012, 09/23/2013, 09/23/2014     Pneumo Conj 13-V (2010&after) 07/28/2015     Pneumo Polysac 23-V 10/20/2008     Td,adult,historic,unspecified 04/12/2004, 01/23/2014     Tdap 01/23/2014     ZOSTER, LIVE 01/01/1900, 06/20/2012       Exam Chest clear to auscultation and percussion.  Heart tones regular rhythm without murmur rub or gallop.  Abdomen soft nontender no organomegaly.  No peritoneal signs.  Extremities free of edema cyanosis or clubbing.  Neck veins nondistended no thyromegaly or scleral icterus noted, carotids full.  Skin warm and dry easily conversant good spirited.  Normal intelligence.  Neurologically intact no gross localizing findings.  Questionable personal hygiene.    Assessment and Plan  Unsafe sexual behavior homosexual behavior.  Check hep C antibody plus HIV antibody.    Ibuprofen allergy.    Diabetes mellitus type 2 last diabetic labs reviewed from August 30, 2018 not repeated.    Anemia on statin therapy no history of MI or stroke.    Panic attacks and anxiety disorder continue Paxil 20 mg daily.    Time: total time spent with the patient was 25 minutes of which >50% was spent in counseling and coordination of care    The following high BMI interventions were performed this visit: encouragement to exercise    Mayito Carlos MD    Patient Active Problem List   Diagnosis     Hiatal Hernia     Lyme Disease     Benign  Prostatic Hypertrophy     Type 2 Diabetes Mellitus - Uncomplicated, Controlled     Hyperlipidemia     Adjustment Disorder With Anxiety     Basal Cell Carcinoma Of The Skin

## 2021-06-21 NOTE — LETTER
Letter by Mayito Carlos MD at      Author: Mayito Carlos MD Service: -- Author Type: --    Filed:  Encounter Date: 4/22/2021 Status: (Other)         Wilfrid Ortez  [address]  [place] [address]             April 22, 2021         Dear Mr. Ortez,    Below are the results from your recent visit:    Resulted Orders   HM2(CBC w/o Differential)   Result Value Ref Range    WBC 7.6 4.0 - 11.0 thou/uL    RBC 4.87 4.40 - 6.20 mill/uL    Hemoglobin 14.6 14.0 - 18.0 g/dL    Hematocrit 42.8 40.0 - 54.0 %    MCV 88 80 - 100 fL    MCH 30.0 27.0 - 34.0 pg    MCHC 34.1 32.0 - 36.0 g/dL    RDW 13.4 11.0 - 14.5 %    Platelets 158 140 - 440 thou/uL    MPV 10.1 (H) 7.0 - 10.0 fL   Comprehensive Metabolic Panel   Result Value Ref Range    Sodium 137 136 - 145 mmol/L    Potassium 4.6 3.5 - 5.0 mmol/L    Chloride 98 98 - 107 mmol/L    CO2 30 22 - 31 mmol/L    Anion Gap, Calculation 9 5 - 18 mmol/L    Glucose 135 (H) 70 - 125 mg/dL    BUN 19 8 - 28 mg/dL    Creatinine 1.23 0.70 - 1.30 mg/dL    GFR MDRD Af Amer >60 >60 mL/min/1.73m2    GFR MDRD Non Af Amer 57 (L) >60 mL/min/1.73m2    Bilirubin, Total 0.5 0.0 - 1.0 mg/dL    Calcium 9.4 8.5 - 10.5 mg/dL    Protein, Total 7.4 6.0 - 8.0 g/dL    Albumin 4.1 3.5 - 5.0 g/dL    Alkaline Phosphatase 117 45 - 120 U/L    AST 19 0 - 40 U/L    ALT 15 0 - 45 U/L    Narrative    Fasting Glucose reference range is 70-99 mg/dL per  American Diabetes Association (ADA) guidelines.   Urinalysis-UC if Indicated   Result Value Ref Range    Color, UA Yellow Colorless, Yellow, Straw, Light Yellow    Clarity, UA Clear Clear    Glucose,  mg/dL (!) Negative    Protein, UA Negative Negative    Bilirubin, UA Negative Negative    Urobilinogen, UA 2.0 E.U./dL (!) 0.2 E.U./dL, 1.0 E.U./dL    pH, UA 6.0 5.0 - 8.0    Blood, UA Negative Negative    Ketones, UA Negative Negative    Nitrite, UA Negative Negative    Leukocytes, UA Negative Negative    Specific Gravity, UA 1.020 1.005 -  1.030    Narrative    Microscopic not indicated  UC not indicated       All very good results; PREOP    Please call with questions or contact us using [employer].    Sincerely,        Electronically signed by Mayito Carlos MD

## 2021-06-21 NOTE — LETTER
Letter by Mayito Carlos MD at      Author: Mayito Carlos MD Service: -- Author Type: --    Filed:  Encounter Date: 11/23/2020 Status: (Other)         Wilfrid Ortez  [address]  [place] [address]             November 23, 2020         Dear Mr. Ortez,    Below are the results from your recent visit:    Resulted Orders   Glycosylated Hemoglobin A1c   Result Value Ref Range    Hemoglobin A1c 6.6 (H) <=5.6 %      Comment:      Normal <5.7% Prediabete 5.7-6.4% Diabletes 6.5% or higher - adopted from ADA consensus guidelines   Microalbumin, Random Urine   Result Value Ref Range    Microalbumin, Random Urine 1.47 0.00 - 1.99 mg/dL    Creatinine, Urine 164.3 mg/dL    Microalbumin/Creatinine Ratio Random Urine 8.9 <=19.9 mg/g    Narrative    Microalbumin, Random Urine  <2.0 mg/dL . . . . . . . . Normal  3.0-30.0 mg/dL . . . . . . Microalbuminuria  >30.0 mg/dL . . . . . .  . Clinical Proteinuria    Microalbumin/Creatinine Ratio, Random Urine  <20 mg/g . . . . .. . . . Normal   mg/g . . . . . . . Microalbuminuria  >300 mg/g . . . . . . . . Clinical Proteinuria       Glucose   Result Value Ref Range    Glucose 129 (H) 70 - 125 mg/dL    Patient Fasting > 8hrs? Yes     Narrative    Fasting Glucose reference range is 70-99 mg/dL per  American Diabetes Association (ADA) guidelines.   Lipid Cascade   Result Value Ref Range    Cholesterol 175 <=199 mg/dL    Triglycerides 114 <=149 mg/dL    HDL Cholesterol 54 >=40 mg/dL    LDL Calculated 98 <=129 mg/dL    Patient Fasting > 8hrs? Yes    PSA (Prostatic-Specific Antigen), Annual Screen   Result Value Ref Range    PSA 3.5 0.0 - 6.5 ng/mL    Narrative    Method is Abbott Prostate-Specific Antigen (PSA)  Standard-WHO 1st International (90:10)   HIV Antigen/Antibody Screening Cascade   Result Value Ref Range    HIV Antigen / Antibody Negative Negative    Narrative    Method is Abbott HIV Ag/Ab for the detection of HIV p24 antigen, HIV-1 antibodies and  HIV-2 antibodies.       All very good results    Please call with questions or contact us using [employer].    Sincerely,        Electronically signed by Mayito Carlos MD

## 2021-06-22 ENCOUNTER — TRANSFERRED RECORDS (OUTPATIENT)
Dept: HEALTH INFORMATION MANAGEMENT | Facility: CLINIC | Age: 81
End: 2021-06-22

## 2021-06-22 NOTE — TELEPHONE ENCOUNTER
Allergy - Ibuprofen    Excessive Dry cough.  Looks like he's had Codeine-Guaifen (5 ml two times a day prn cough) in the past.  Please advise  Soco Mares, CMA

## 2021-06-22 NOTE — TELEPHONE ENCOUNTER
Rx teed for auth- called patient, no answer and message on phone stated to try again later. Unable to leave vm.

## 2021-06-22 NOTE — PROGRESS NOTES
Office Visit - Physical    Wilfrid Ortez   78 y.o. male    Date of Visit: 12/31/2018    Chief Complaint   Patient presents with     Pre-op Exam     Yag capsulotomy left eye Dr. Luque on 1/9/2019 at [facility] Ctr fax [phone]     Cough       Subjective: Preoperative examination in anticipation of YAG capsulotomy left eye with Dr. Luque.    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    Date of surgery January 9, 2019.    [facility].  Number there [phone].  [phone]  [phone].    Impaired vision left eye.  Patient has had cough tried Robitussin chest x-rays pending.  Offered patient antibiotic patient declined.    ROS: A comprehensive review of systems was performed and was otherwise negative    Medications:   Prior to Admission medications    Medication Sig Start Date End Date Taking? Authorizing Provider   aspirin 325 MG tablet Take 325 mg by mouth daily.   Yes PROVIDER, HISTORICAL   blood glucose test (ACCU-CHEK SMARTVIEW TEST STRIP) strips Test each day as directed. 2/23/18  Yes Mayito Carlos MD   blood-glucose meter Misc Use to test once per day 2/23/18  Yes Mayito Carlos MD   dorzolamide-timolol (COSOPT) 22.3-6.8 mg/mL ophthalmic solution APPLY 1 DROP IN BOTH EYES BID 6/13/18  Yes PROVIDER, HISTORICAL   lancets (ACCU-CHEK FASTCLIX) Misc Test each day as directed. 7/28/15  Yes Mayito Carlos MD   latanoprost (XALATAN) 0.005 % ophthalmic solution Administer 1 drop to both eyes at bedtime.   Yes PROVIDER, HISTORICAL   LORazepam (ATIVAN) 0.5 MG tablet TK 1 T PO QD PRF ANXIETY AND 1-2 TS PO QHS 5/17/18  Yes PROVIDER, HISTORICAL   omeprazole (PRILOSEC) 20 MG capsule TAKE 1 CAPSULE(20 MG) BY MOUTH DAILY 11/24/18  Yes Mayito Carlos MD   PARoxetine (PAXIL) 20 MG tablet Take 40 mg by mouth every morning. For a total dose of 50 mg a day.   Yes PROVIDER, HISTORICAL   simvastatin (ZOCOR) 20 MG tablet TAKE ONE TABLET BY MOUTH EVERY EVENING 12/29/18  Yes Mayito Carlos MD    propranolol (INDERAL) 10 MG tablet Take 10-20 mg by mouth. 6/15/18   PROVIDER, HISTORICAL       Allergies:  Allergies   Allergen Reactions     Ibuprofen Nausea Only       Immunizations:   Immunization History   Administered Date(s) Administered     DT (pediatric) 2004     Influenza [id number], 01/15/2010     Influenza high dose, seasonal 10/27/2015, 2016, 2018, 10/30/2018     Influenza, inj, historic,unspecified 10/22/2007, 10/20/2008, 09/15/2009, 2010, 10/21/2011     Influenza, seasonal,quad inj 6-35 mos 12/10/2012, 2013, 2014     Pneumo Conj 13-V (2010&after) 2015     Pneumo Polysac 23-V 10/20/2008     Td,adult,historic,unspecified 2004, 2014     Tdap 2014     ZOSTER, LIVE 1900, 2012       Health Maintenance: Immunizations reviewed and up-to-date.    Non-smoker.    No alcohol.    Allergy ibuprofen with nausea but no rash.    Past Medical History: Diabetes mellitus type 2.    Lyme disease.    Hyperlipidemia.    History of panic attacks and anxiety better now.    Past Surgical History: Status post TURP for BPH no cancer of the prostate found.    Family History: Mother  72 cancer.    Father  57 rheumatic heart disease.    Patient was  once the marriage was a no.  No partner at this time.  No children.    Social History: Retired executive.  Active at [employer].    Exam Chest clear to auscultation and percussion.  Heart tones regular rhythm without murmur rub or gallop.  Abdomen soft nontender no organomegaly.  No peritoneal signs.  Extremities free of edema cyanosis or clubbing.  Neck veins nondistended no thyromegaly or scleral icterus noted, carotids full.  Skin warm and dry easily conversant good spirited.  Normal intelligence.  Neurologically intact no gross localizing findings.  Personal hygiene is questionable that there is ptosis of the right eye wears glasses.  Conversant    Assessment and Plan  Medically  acceptable risk for anticipated YAG capsulotomy surgery left eye.  Dr. Wilfrid Luque presiding.  [facility].  January 9, 2019.    Chest x-ray today for cough.  Afebrile 97.7.  126/74 pulse 81 respirations 18 BMI slightly elevated at 25.56.  Continue cough syrup offered antibiotic patient declined.    Total time spent with the patient today was 40 minutes of which greater than 50% was spent in counseling and coordination of care.    Mayito Carlos MD    Patient Active Problem List   Diagnosis     Hiatal Hernia     Lyme Disease     Benign Prostatic Hypertrophy     Type 2 Diabetes Mellitus - Uncomplicated, Controlled     Hyperlipidemia     Adjustment Disorder With Anxiety     Basal Cell Carcinoma Of The Skin

## 2021-06-22 NOTE — TELEPHONE ENCOUNTER
Medication Request  Medication name:   Cough Medication  Pharmacy Name and Location:  [facility]    Reason for request:  Dry excessive cough  When did you use medication last?:     12/31/2018  Okay to leave a detailed message: yes

## 2021-06-23 NOTE — TELEPHONE ENCOUNTER
Pt seen in  01 22 19 talked to triage 01 21 19  Diagnosed with pneumonia and had a z pack and his brother passed away and now wheezing last night  The abx helped and than started getting sx again  Pt has a cough again  No fever  No shortness of breath    Recommended appt and he prefers to go to  near his home in [place] for this again.    Teresa Do, RN Care Connection RN Triage      Reason for Disposition    Wheezing is present    Protocols used: COUGH-A-OH

## 2021-06-23 NOTE — TELEPHONE ENCOUNTER
RN Assessment/Reason for Call:   NOT Okay to leave Detailed Message  Patient calling in, saw  12/31/18  Cold cough. Was given cough med with codeine.  1/7/19 was in hospital visiting a relative 7 times. Brother had e coli.  Wheezing, on and off. Been in bed since 1/16/19.    RN Action/Disposition:  Protocol recommends home care; nurse feels with his age see Dr in 24 hrs.  Offered [facility], he will go to [facility] across the street.  Call back if worse symptoms  Discussed home care measures.  Agrees to plan.     Rose Manriquez, GENE    [facility] Connection Triage/med refill  1/21/2019  12:52 PM        Reason for Disposition    Cough lasts > 3 weeks    Protocols used: COUGH - CHRONIC-A-AH

## 2021-06-24 ENCOUNTER — RECORDS - HEALTHEAST (OUTPATIENT)
Dept: ADMINISTRATIVE | Facility: OTHER | Age: 81
End: 2021-06-24

## 2021-06-24 NOTE — PROGRESS NOTES
Office Visit - Physical    Wilfrid Ortez   78 y.o. male    Date of Visit: 2/12/2019    Chief Complaint   Patient presents with     Pre-op Exam     Cataract  left eye Dr. Luque on 2/27/2019 at [facility]  [phone]       Subjective: Preoperative examination in anticipation of cataract surgery left eye with Dr. Wilfrid Luque February 27, 2019 [facility].    78-year-old male with history of type 2 diabetes recent diagnosis of pneumonia in late January 2000 19+ chest x-ray treated with Z-Evaristo improved.    Colonoscopy dated February 25, 2014 with Dr. Bonnie Schneider normal.    Impaired vision left eye.    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  Prior history of YAG capsulotomy left eye.        ROS: A comprehensive review of systems was performed and was otherwise negative    Medications:   Prior to Admission medications    Medication Sig Start Date End Date Taking? Authorizing Provider   aspirin 325 MG tablet Take 325 mg by mouth daily.   Yes PROVIDER, HISTORICAL   blood glucose test (ACCU-CHEK SMARTVIEW TEST STRIP) strips Test each day as directed. 2/23/18  Yes Mayito Carlos MD   dorzolamide-timolol (COSOPT) 22.3-6.8 mg/mL ophthalmic solution APPLY 1 DROP IN BOTH EYES BID 6/13/18  Yes PROVIDER, HISTORICAL   lancets (ACCU-CHEK FASTCLIX) Misc Test each day as directed. 7/28/15  Yes Mayito Carlos MD   latanoprost (XALATAN) 0.005 % ophthalmic solution Administer 1 drop to both eyes at bedtime.   Yes PROVIDER, HISTORICAL   LORazepam (ATIVAN) 0.5 MG tablet TK 1 T PO QD PRF ANXIETY AND 1-2 TS PO QHS 5/17/18  Yes PROVIDER, HISTORICAL   omeprazole (PRILOSEC) 20 MG capsule TAKE 1 CAPSULE(20 MG) BY MOUTH DAILY 11/24/18  Yes Mayito Carlos MD   PARoxetine (PAXIL) 20 MG tablet Take 40 mg by mouth every morning. For a total dose of 50 mg a day.   Yes PROVIDER, HISTORICAL   simvastatin (ZOCOR) 20 MG tablet TAKE ONE TABLET BY MOUTH EVERY EVENING 12/29/18  Yes Mayito Carlos MD    blood-glucose meter Misc Use to test once per day 18   Mayito Carlos MD   propranolol (INDERAL) 10 MG tablet Take 10-20 mg by mouth. 6/15/18   PROVIDER, HISTORICAL   codeine-guaiFENesin (GUAIFENESIN AC)  mg/5 mL liquid Take 5 mL by mouth 2 (two) times a day as needed for cough. 19  Yuval Wheeler MD       Allergies:  Allergies   Allergen Reactions     Ibuprofen Nausea Only       Immunizations:   Immunization History   Administered Date(s) Administered     DT (pediatric) 2004     Influenza [id number], 01/15/2010     Influenza high dose, seasonal 10/27/2015, 2016, 2018, 10/30/2018     Influenza, inj, historic,unspecified 10/22/2007, 10/20/2008, 09/15/2009, 2010, 10/21/2011     Influenza, seasonal,quad inj 6-35 mos 12/10/2012, 2013, 2014     Pneumo Conj 13-V (2010&after) 2015     Pneumo Polysac 23-V 10/20/2008     Td,adult,historic,unspecified 2004, 2014     Tdap 2014     ZOSTER, LIVE 1900, 2012       Health Maintenance: Immunizations reviewed and up-to-date.    Non-smoker.    No alcohol.    Ibuprofen with nausea as the only allergy but no associated rash.    Past Medical History: Diabetes mellitus type 2 with history of Lyme disease as well as history of hyperlipidemia.    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    Recent diagnosis of pneumonia treated with Z-Evaristo 2019 at [place].  Better now after Z-Evaristo.    History of panic attacks and anxiety.  Disabled from work secondary to same.    Past Surgical History: History of TURP for BPH no prostate cancer found.    Family History: One brother  atrial fibrillation recently plus was in a coma.    Mother  72 cancer.  The recently  brother may have had hemochromatosis.    Father  57 rheumatic heart disease.  Patient was  once no children his ex-wife and an old partner continues to stay  in touch and is called him during his recently illness with pneumonia.    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    Exam Chest clear to auscultation and percussion.  Heart tones regular rhythm without murmur rub or gallop.  Abdomen soft nontender no organomegaly.  No peritoneal signs.  Extremities free of edema cyanosis or clubbing.  Neck veins nondistended no thyromegaly or scleral icterus noted, carotids full.  Skin warm and dry easily conversant good spirited.  Normal intelligence.  Neurologically intact no gross localizing findings.    Assessment and Plan  Medically acceptable risk for anticipated cataract surgery.  Preoperatively check hemoglobin potassium level today.  Dr. Wilfrid Luque presiding [place] [facility] [place] February 27, 2019.    Total time spent with the patient today was 40 minutes of which greater than 50% was spent in counseling and coordination of care.    Mayito Carlos MD    Patient Active Problem List   Diagnosis     Hiatal Hernia     Lyme Disease     Benign Prostatic Hypertrophy     Type 2 Diabetes Mellitus - Uncomplicated, Controlled     Hyperlipidemia     Adjustment Disorder With Anxiety     Basal Cell Carcinoma Of The Skin

## 2021-06-25 NOTE — TELEPHONE ENCOUNTER
Primo w/[facility] calling requesting     Verbal Order for Social Work Acessesment     If Primo is unable to answer, a  Detailed message can be left the voice mail is secure and confidential.

## 2021-07-03 NOTE — ADDENDUM NOTE
Addendum Note by Neto Brady MD at 7/20/2020  8:40 AM     Author: Neto Brady MD Service: -- Author Type: Physician    Filed: 7/20/2020  9:05 AM Encounter Date: 7/20/2020 Status: Signed    : Neto Brady MD (Physician)    Addended by: NETO BRADY on: 7/20/2020 09:05 AM        Modules accepted: Orders

## 2021-07-05 ENCOUNTER — COMMUNICATION - HEALTHEAST (OUTPATIENT)
Dept: INTERNAL MEDICINE | Facility: CLINIC | Age: 81
End: 2021-07-05

## 2021-07-05 DIAGNOSIS — E11.8 TYPE 2 DIABETES MELLITUS WITH COMPLICATION, WITHOUT LONG-TERM CURRENT USE OF INSULIN (H): ICD-10-CM

## 2021-07-05 DIAGNOSIS — E78.5 HLD (HYPERLIPIDEMIA): ICD-10-CM

## 2021-07-05 NOTE — TELEPHONE ENCOUNTER
Telephone Encounter by Mariela Mei RN at 7/5/2021 12:01 PM     Author: Mariela Mei RN Service: -- Author Type: Registered Nurse    Filed: 7/5/2021 12:04 PM Encounter Date: 7/5/2021 Status: Signed    : Mariela Mei RN (Registered Nurse)       RN cannot approve Refill Request    RN can NOT refill this medication Refills available/medication refilled 3/22/21 (90 tablets with 3 refills). Last office visit: 3/22/2021 Mayito Carlos MD Last Physical: 4/20/2021 Last MTM visit: Visit date not found Last visit same specialty: 3/22/2021 Mayito Carlos MD.  Next visit within 3 mo: Visit date not found  Next physical within 3 mo: Visit date not found      Jenny Raines Connection Triage/Med Refill 7/5/2021    Requested Prescriptions   Pending Prescriptions Disp Refills   ? simvastatin (ZOCOR) 20 MG tablet [Pharmacy Med Name: SIMVASTATIN 20MG TABLETS] 90 tablet 3     Sig: TAKE 1 TABLET BY MOUTH EVERY EVENING       Statins Refill Protocol (Hmg CoA Reductase Inhibitors) Passed - 7/5/2021 10:41 AM        Passed - PCP or prescribing provider visit in past 12 months      Last office visit with prescriber/PCP: 3/22/2021 Mayito Carlos MD OR same dept: 11/20/2020 Mayito Carlos MD OR same specialty: 3/22/2021 Mayito Carlos MD  Last physical: 4/20/2021 Last MTM visit: Visit date not found   Next visit within 3 mo: Visit date not found  Next physical within 3 mo: Visit date not found  Prescriber OR PCP: Mayito Carlos MD  Last diagnosis associated with med order: 1. HLD (hyperlipidemia)  - simvastatin (ZOCOR) 20 MG tablet [Pharmacy Med Name: SIMVASTATIN 20MG TABLETS]; TAKE 1 TABLET BY MOUTH EVERY EVENING  Dispense: 90 tablet; Refill: 3    2. Type 2 diabetes mellitus with complication, without long-term current use of insulin (H)  - simvastatin (ZOCOR) 20 MG tablet [Pharmacy Med Name: SIMVASTATIN 20MG TABLETS]; TAKE 1 TABLET BY MOUTH EVERY EVENING  Dispense: 90 tablet; Refill:  3    If protocol passes may refill for 12 months if within 3 months of last provider visit (or a total of 15 months).

## 2021-07-09 ENCOUNTER — COMMUNICATION - HEALTHEAST (OUTPATIENT)
Dept: INTERNAL MEDICINE | Facility: CLINIC | Age: 81
End: 2021-07-09

## 2021-07-11 PROBLEM — F32.9 MAJOR DEPRESSIVE DISORDER, SINGLE EPISODE, UNSPECIFIED: Status: ACTIVE | Noted: 2021-07-05

## 2021-07-11 PROBLEM — C44.91 BASAL CELL CARCINOMA OF SKIN: Status: ACTIVE | Noted: 2021-07-06

## 2021-07-11 PROBLEM — E78.5 HYPERLIPIDEMIA: Status: ACTIVE | Noted: 2021-07-06

## 2021-07-11 PROBLEM — G47.00 INSOMNIA: Status: ACTIVE | Noted: 2021-07-05

## 2021-07-11 PROBLEM — C44.310 BASAL CELL CARCINOMA OF SKIN OF FACE: Status: ACTIVE | Noted: 2017-10-30

## 2021-07-11 PROBLEM — F41.9 ANXIETY DISORDER, UNSPECIFIED: Status: ACTIVE | Noted: 2021-07-05

## 2021-07-11 PROBLEM — K44.9 HIATAL HERNIA: Status: ACTIVE | Noted: 2021-07-06

## 2021-07-11 PROBLEM — G47.61 PERIODIC LIMB MOVEMENT DISORDER: Status: ACTIVE | Noted: 2017-06-29

## 2021-07-11 PROBLEM — F41.0 PANIC DISORDER: Status: ACTIVE | Noted: 2017-09-30

## 2021-07-11 PROBLEM — G47.10 HYPERSOMNIA, UNSPECIFIED: Status: ACTIVE | Noted: 2021-07-05

## 2021-07-11 PROBLEM — K21.9 GASTRO-ESOPHAGEAL REFLUX DISEASE WITHOUT ESOPHAGITIS: Status: ACTIVE | Noted: 2021-07-05

## 2021-07-13 ENCOUNTER — TELEPHONE (OUTPATIENT)
Dept: INTERNAL MEDICINE | Facility: CLINIC | Age: 81
End: 2021-07-13

## 2021-07-13 NOTE — TELEPHONE ENCOUNTER
Dr. Terrance Herring calling from [facility] she said that she has an outstanding order that needs to be signed.  She is going to fax this again.

## 2021-07-15 ENCOUNTER — MEDICAL CORRESPONDENCE (OUTPATIENT)
Dept: HEALTH INFORMATION MANAGEMENT | Facility: CLINIC | Age: 81
End: 2021-07-15

## 2021-07-20 ENCOUNTER — TRANSFERRED RECORDS (OUTPATIENT)
Dept: HEALTH INFORMATION MANAGEMENT | Facility: CLINIC | Age: 81
End: 2021-07-20
Payer: COMMERCIAL

## 2021-07-22 NOTE — LETTER
Letter by Mayito Carlos MD at      Author: Mayito Carlos MD Service: -- Author Type: --    Filed:  Encounter Date: 7/9/2021 Status: (Other)         Wilfrid BOWMAN Tedtori  [address]  [place] [address]             July 9, 2021         Dear Mr. Ortez,    Below are the results from your recent visit:    Resulted Orders   Glycosylated Hemoglobin A1c   Result Value Ref Range    Hemoglobin A1c 6.4 (H) <=5.6 %   Glucose   Result Value Ref Range    Glucose 153 (H) 70 - 125 mg/dL    Patient Fasting > 8hrs? Unknown     Narrative    Fasting Glucose reference range is 70-99 mg/dL per  American Diabetes Association (ADA) guidelines.   Lipid Cascade   Result Value Ref Range    Cholesterol 173 <=199 mg/dL    Triglycerides 113 <=149 mg/dL    HDL Cholesterol 50 >=40 mg/dL    LDL Calculated 100 <=129 mg/dL    Patient Fasting > 8hrs? Unknown        All very good results    Please call with questions or contact us using [employer].    Sincerely,        Electronically signed by Mayito Carlos MD

## 2021-07-29 ENCOUNTER — TELEPHONE (OUTPATIENT)
Dept: INTERNAL MEDICINE | Facility: CLINIC | Age: 81
End: 2021-07-29

## 2021-07-29 NOTE — TELEPHONE ENCOUNTER
Azalia with [employer] is calling on the status of form sent on 7/23/21 to PCP.    Requesting a call back confirmed form was received at  ext 1

## 2021-07-29 NOTE — TELEPHONE ENCOUNTER
Please check with Palmira Vicente my assistant upon her return to the clinic her  is ill and she may know exactly where the form is I am not sure I was seen and or I know I do not have a

## 2021-08-03 ENCOUNTER — MEDICAL CORRESPONDENCE (OUTPATIENT)
Dept: HEALTH INFORMATION MANAGEMENT | Facility: CLINIC | Age: 81
End: 2021-08-03

## 2021-08-23 ENCOUNTER — ANCILLARY PROCEDURE (OUTPATIENT)
Dept: NEUROLOGY | Facility: CLINIC | Age: 81
End: 2021-08-23
Attending: PSYCHIATRY & NEUROLOGY
Payer: COMMERCIAL

## 2021-08-23 VITALS
DIASTOLIC BLOOD PRESSURE: 68 MMHG | BODY MASS INDEX: 24.43 KG/M2 | HEART RATE: 58 BPM | WEIGHT: 152 LBS | SYSTOLIC BLOOD PRESSURE: 129 MMHG | HEIGHT: 66 IN

## 2021-08-23 DIAGNOSIS — G25.0 BENIGN ESSENTIAL TREMOR: ICD-10-CM

## 2021-08-23 DIAGNOSIS — G45.4 TRANSIENT GLOBAL AMNESIA: ICD-10-CM

## 2021-08-23 DIAGNOSIS — R42 VERTIGO: ICD-10-CM

## 2021-08-23 DIAGNOSIS — G45.4 TRANSIENT GLOBAL AMNESIA: Primary | ICD-10-CM

## 2021-08-23 PROCEDURE — 95816 EEG AWAKE AND DROWSY: CPT | Performed by: PSYCHIATRY & NEUROLOGY

## 2021-08-23 PROCEDURE — 99214 OFFICE O/P EST MOD 30 MIN: CPT | Performed by: PSYCHIATRY & NEUROLOGY

## 2021-08-23 ASSESSMENT — MIFFLIN-ST. JEOR: SCORE: 1352.22

## 2021-08-23 NOTE — LETTER
"    8/23/2021         RE: Wilfrid Ortez  [address]  [address]  [place] [address]        Dear Colleague,    Thank you for referring your patient, Wilfrid Ortez, to the [facility] [place]. Please see a copy of my visit note below.    In person evaluation    In person evaluation        History of Present Illness   9/11/2019, seen in person  4/2/2021, in person evaluation  8/23/2021, in person evaluation    80-year-old seen neurologically for:  Transient global amnesia event April 1, 2021  Significant anxiety disorder follows with psychiatry  Past event 2016 worked up with abnormal MRI/EEG  Essential tremor with family history of tremor    Patient being seen for a new complaint possible transient global amnesia    Patient was in the ER on June 24, 2021 with vertigo  Patient blamed it on actually missing his morning medicines that day which include Paxil but sometimes an abrupt change in dose can cause unsteadiness/dizziness  Seem to respond to treatment with meclizine and some exercises  Work-up showed a CTA with questionable changes on the intracranial vessels but there was motion artifact  Follow-up MRA showed no significant intracranial stenosis  Laboratory data reviewed    Since last seen no further episodes of \"amnesia\"  EEG shows a little bit of mild 5-6 Hz theta disorganization on the right hemisphere rarely  Back in 2016 EEG showed a little bit of the left theta disorganization 5-6 Hz  This is a fairly nonspecific finding prefer not to add any antiepileptic medications unless he had stereotypical episodes with a discrete onset and offset.    Discussed the above with patient    In regards to his essential tremor he is moved into an apartment  He is not out in about quite as much  He uses 10 mg twice daily and sometimes 3 times daily  Has more trouble when he is trying to eat dinner due to the action component  Talked about some of the newer treatments that are out but " "I do not feel this tremor is bad enough to warrant those he agreed        Neurologic history review:  April 1, 2021 had difficulty with amnesia  He had woke up in the afternoon at about 12:30 PM on 4/1/2021.  He seemed to be forgetting events of the morning.  It seemed that the memory difficulty or amnesia was transient  He was alone at the time that this was occurring  He went on to have an MRI scan of the brain that was unremarkable    Patient had seen a psychiatrist on March 18, 2021  Has had 8 panic attacks over 2 months.  More than usual    Patient states that on April 1, 2021 he had gotten up in the morning had breakfast  He then picked up a friend and took his friend from the [facility] over to the [facility] to drive him off for an appointment  He felt awful he was driving over there  He dropped him off and then left him  When he came home he talk to his partner stated that he was not feeling well  He laid down  He then awoke later and seemed to have forgotten what had happened over at least an hour to couple hours    10 to 15 years ago he was up at [facility] with his brother he was out on a boat  He supposedly \"passed out\" in [facility] 7 PM in the evening  Did not have any recollection  His 81 mg of aspirin was increased to full aspirin possible TIA  Unclear if that was an amnestic spell    He is on some propranolol 10 mg twice daily for his tremor cannot remember the middle of the day dose so he only takes it twice a day  Does have some inhalers but does not get exacerbated by his propranolol    Formal [place] testing today was 24 out of 30    The patient does have difficulty with some head tremor, some voice tremor, and some hand tremor.    The patient notices the tremor more when he is more stressed or anxious.    He does have a past history of panic attacks, which are variable and uses lorazepam for this.        Past medical history    1. History of action tremor going on for five " plus years evaluated by Dr. Michi Alanis in the past in 2016.  2. 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  3. Has panic attacks, is on lorazepam, which may give some of the fatigue.  4. Has a past note mentioning some mild memory loss.  5. Hyperlipidemia.  6. Obstructive sleep apnea going on for greater than six plus years.  7. Some visual changes with loss of vision in the left eye and droopiness of the right eye.  8.  Left carpal tunnel syndrome  9.  History of panic attacks with dizziness and off-balance sensation  10.  Diabetes mellitus.  Hemoglobin A1c's 6.9%, 6.6%, 7.3%    Past medical history  Anxiety/panic attacks  Hyperlipidemia  Diabetes  Benign prostatic hypertrophy  Hiatal hernia  Obstructive sleep apnea  Blepharospasm      Habits  Does not smoke  Does not drink alcohol  Patient is left-handed  Goals right-handed  Goals left-handed  Mouse on computer uses the right hand      Family history  Father with heart disease  at 57 also had rheumatic fever  Mother with cancer of the throat  at 72 also had diabetes mellitus.   Brother with epilepsy, DM  No family history of tremor    Past work-up  1. MRI scan of the head December 15, 2016 small vessel changes, small encephalomalacia in the posterior body of  the corpus callosum.  2. EEG on December 15, 2016, dysrhythmia grade II read by Dr. Taylor with some 8 Hz posterior dominant rhythm, some 5 to 6 Hz theta, a little bit of left temporal slowing.    Recent work-up  MRI scan brain 2021  A.  No acute stroke mass or bleed  B.  Mild to moderate atrophy and chronic small vessel changes  C.  Mild to moderate cerebellar atrophy   laboratory data 2021  Sodium 140 potassium 4.3  BUN 18 creatinine 1.2  Glucose 107  White blood count 6.5, hemoglobin 14.5, platelets 154,000  Todd cognitive assessment score 2021  24 out of 30  Previous writing samples in the chart.    CT scan head 2021  A. No  CT evidence for acute intracranial process.   B. Brain atrophy and presumed chronic microvascular ischemic changes as above.   HEAD CTA: 6/24/2021  1.  Apparent loss of contrast opacification involving a diminutive in size, proximal to mid left M2 anterior temporal division middle cerebral artery with distal reconstitution. There is motion artifact in this region and this finding could be artifactual, however, a high-grade stenosis versus short segment occlusion with distal reconstitution are other considerations. Correlation for left MCA territory neurologic deficit is recommended consider MRI for further assessment.   2.  Short segment moderate to severe stenosis versus motion artifact of a mid right M2 ascending division middle cerebral artery.   3.  No proximal large vessel occlusion, aneurysm or high flow vascular malformation.   NECK CTA: 6/24/2021  1.  No significant stenosis or dissection.    MRI scan head 6/25/2021  A.  No acute findings.   B.  Mild age-related changes.   HEAD MRA: 6/25/2021  No stenosis/occlusion, aneurysm, or high flow vascular malformation.   NECK MRA:   No measurable stenosis or dissection.  Hemoglobin A1c 6.4% (7/6/2021)  HDL 50/, (7/6/2021)  EEG 8/23/2021, 8 Hz predominant rhythm, rare 5 to 6 Hz theta disorganization of the right hemisphere no specific epileptiform discharges     Review of Systems   No headache no chest pain no shortness of breath no nausea vomiting no diarrhea no fever chills  No cough  No diplopia dysarthria dysphagia    Chronic tremor    Memory stabilized    No chest pain.   No abdominal pain  No recent illness    General exam  Blood pressure 129/68, pulse 58, temperature 97  HEENT normal  Lungs clear no wheezing  Heart rate regular  Abdomen soft  Symmetrical pulses  No edema the feet      Neurologic exam  Alert oriented x3  Normal prosody speech  Normal naming  Normal repetition  Normal comprehension  No neglect  Memory okay in the past has had some very  "subtle difficulty with memory  Formal [place] score 24 out of 30    Cranials 2 through 12  No ophthalmoplegia  No nystagmus  Face symmetrical  Decreased visual acuity left eye chronic  No visual field cut  Tongue twisters okay  Hypophonic voice      Parkinsonian features  Slight decrease in blink  Soft voice  Action tremor  Slight voice tremor  Not much head to titubation  Left eyelid heavier than right chronic      Upper extremities  No drift  Mild action tremor  Finger-nose okay    Lower extremities  Test in the seated position good strength    Gait  Got up easier today than last visit  Has a click or problem with his hip  Actually did fairly well getting up from the chair today compared to before  Ambulated without a assistive device up and down the hallway    Reflexes symmetrical  No Swift reflex        Assessment/Plan       1.   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  CTS (carpal tunnel syndrome) (G56.02)        Patient is left-handed       Patient does play piano for most of his life       EMG September 11, 2019 shows moderate left CTS with active and chronic denervation          4.  Essential tremor (G25.0)        Continue propranolol 10 mg tablet 3 times per day as needed      At this time he has:    1. Essential tremor going on as far back as high school.  2. Anxiety disorder that exacerbates the tremor, which involves his head, speech, arms, right greater than left.  3. History of subtle memory " changes, some changes on MRI scan and EEG changes in 2016.    1. His B12 level was adequate at 1197, TSH was 1.75.  2. At this time I feel that his anxiety disorder exacerbates an underlying essential tremor.  3. He can use the beta-blocker 10 mg p.r.n. maximum of three times per day. He is aware of some drowsiness with this.  4. He will follow up on a yearly basis for his tremor  5. We did discuss his laboratory data today also. He has a brother with some B12 deficiency, better to stay on his replacement even though it is a little bit high.  6. Complains of left shoulder pain radicular EMG of the left arm did not show any evidence of radiculopathy on September 11, 2019  7. EMG September 11, 2019 does show evidence of moderate carpal tunnel syndrome on the left patient play piano quite a bit of his life I feel he would get some improvement from some of his symptoms if the carpal tunnel was fixed      Patient has significant anxiety disorder  Patient possibly had a episode 10 to 15 years ago where he passed out versus having amnesia  Transient global amnesia type spell April 1, 2021  Was driving a friend to the hospital  Previously had some mild difficulty with memory 2016    Transient global amnesia versus panic attack versus mild confusion    MRI scan [facility] unremarkable  EEG August 23, 2021 mild right 5-6 Hz theta disorganization rare no epileptiform activity    Discussed the above with patient  We will follow-up in April for his tremor and other medical issues  Track is difficulty with amnesia    Total care time today 32 minutes    As part of visit today  Reviewed CT scan CTA 6/24/2021  Reviewed MRA MRI 6/25/2021  Primary MD notes 7/6/2021  ER notes 6/24/2021 vertigo  Reviewed labs 7/6/2021      Again, thank you for allowing me to participate in the care of your patient.        Sincerely,        Jairo Tamayo MD

## 2021-08-23 NOTE — PROGRESS NOTES
"In person evaluation    In person evaluation        History of Present Illness   9/11/2019, seen in person  4/2/2021, in person evaluation  8/23/2021, in person evaluation    80-year-old seen neurologically for:  Transient global amnesia event April 1, 2021  Significant anxiety disorder follows with psychiatry  Past event 2016 worked up with abnormal MRI/EEG  Essential tremor with family history of tremor    Patient being seen for a new complaint possible transient global amnesia    Patient was in the ER on June 24, 2021 with vertigo  Patient blamed it on actually missing his morning medicines that day which include Paxil but sometimes an abrupt change in dose can cause unsteadiness/dizziness  Seem to respond to treatment with meclizine and some exercises  Work-up showed a CTA with questionable changes on the intracranial vessels but there was motion artifact  Follow-up MRA showed no significant intracranial stenosis  Laboratory data reviewed    Since last seen no further episodes of \"amnesia\"  EEG shows a little bit of mild 5-6 Hz theta disorganization on the right hemisphere rarely  Back in 2016 EEG showed a little bit of the left theta disorganization 5-6 Hz  This is a fairly nonspecific finding prefer not to add any antiepileptic medications unless he had stereotypical episodes with a discrete onset and offset.    Discussed the above with patient    In regards to his essential tremor he is moved into an apartment  He is not out in about quite as much  He uses 10 mg twice daily and sometimes 3 times daily  Has more trouble when he is trying to eat dinner due to the action component  Talked about some of the newer treatments that are out but I do not feel this tremor is bad enough to warrant those he agreed        Neurologic history review:  April 1, 2021 had difficulty with amnesia  He had woke up in the afternoon at about 12:30 PM on 4/1/2021.  He seemed to be forgetting events of the morning.  It seemed that " "the memory difficulty or amnesia was transient  He was alone at the time that this was occurring  He went on to have an MRI scan of the brain that was unremarkable    Patient had seen a psychiatrist on March 18, 2021  Has had 8 panic attacks over 2 months.  More than usual    Patient states that on April 1, 2021 he had gotten up in the morning had breakfast  He then picked up a friend and took his friend from the [facility] over to the [facility] to drive him off for an appointment  He felt awful he was driving over there  He dropped him off and then left him  When he came home he talk to his partner stated that he was not feeling well  He laid down  He then awoke later and seemed to have forgotten what had happened over at least an hour to couple hours    10 to 15 years ago he was up at [facility] with his brother he was out on a boat  He supposedly \"passed out\" in [facility] 7 PM in the evening  Did not have any recollection  His 81 mg of aspirin was increased to full aspirin possible TIA  Unclear if that was an amnestic spell    He is on some propranolol 10 mg twice daily for his tremor cannot remember the middle of the day dose so he only takes it twice a day  Does have some inhalers but does not get exacerbated by his propranolol    Formal [place] testing today was 24 out of 30    The patient does have difficulty with some head tremor, some voice tremor, and some hand tremor.    The patient notices the tremor more when he is more stressed or anxious.    He does have a past history of panic attacks, which are variable and uses lorazepam for this.        Past medical history    1. History of action tremor going on for five plus years evaluated by Dr. Michi Alanis in the past in 2016.  2. 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  3. Has panic attacks, is on lorazepam, which may give some of the fatigue.  4. Has a past note mentioning some mild memory " loss.  5. Hyperlipidemia.  6. Obstructive sleep apnea going on for greater than six plus years.  7. Some visual changes with loss of vision in the left eye and droopiness of the right eye.  8.  Left carpal tunnel syndrome  9.  History of panic attacks with dizziness and off-balance sensation  10.  Diabetes mellitus.  Hemoglobin A1c's 6.9%, 6.6%, 7.3%    Past medical history  Anxiety/panic attacks  Hyperlipidemia  Diabetes  Benign prostatic hypertrophy  Hiatal hernia  Obstructive sleep apnea  Blepharospasm      Habits  Does not smoke  Does not drink alcohol  Patient is left-handed  Goals right-handed  Goals left-handed  Mouse on computer uses the right hand      Family history  Father with heart disease  at 57 also had rheumatic fever  Mother with cancer of the throat  at 72 also had diabetes mellitus.   Brother with epilepsy, DM  No family history of tremor    Past work-up  1. MRI scan of the head December 15, 2016 small vessel changes, small encephalomalacia in the posterior body of  the corpus callosum.  2. EEG on December 15, 2016, dysrhythmia grade II read by Dr. Taylor with some 8 Hz posterior dominant rhythm, some 5 to 6 Hz theta, a little bit of left temporal slowing.    Recent work-up  MRI scan brain 2021  A.  No acute stroke mass or bleed  B.  Mild to moderate atrophy and chronic small vessel changes  C.  Mild to moderate cerebellar atrophy   laboratory data 2021  Sodium 140 potassium 4.3  BUN 18 creatinine 1.2  Glucose 107  White blood count 6.5, hemoglobin 14.5, platelets 154,000  Leslie cognitive assessment score 2021  24 out of 30  Previous writing samples in the chart.    CT scan head 2021  A. No CT evidence for acute intracranial process.   B. Brain atrophy and presumed chronic microvascular ischemic changes as above.   HEAD CTA: 2021  1.  Apparent loss of contrast opacification involving a diminutive in size, proximal to mid left M2 anterior temporal  division middle cerebral artery with distal reconstitution. There is motion artifact in this region and this finding could be artifactual, however, a high-grade stenosis versus short segment occlusion with distal reconstitution are other considerations. Correlation for left MCA territory neurologic deficit is recommended consider MRI for further assessment.   2.  Short segment moderate to severe stenosis versus motion artifact of a mid right M2 ascending division middle cerebral artery.   3.  No proximal large vessel occlusion, aneurysm or high flow vascular malformation.   NECK CTA: 6/24/2021  1.  No significant stenosis or dissection.    MRI scan head 6/25/2021  A.  No acute findings.   B.  Mild age-related changes.   HEAD MRA: 6/25/2021  No stenosis/occlusion, aneurysm, or high flow vascular malformation.   NECK MRA:   No measurable stenosis or dissection.  Hemoglobin A1c 6.4% (7/6/2021)  HDL 50/, (7/6/2021)  EEG 8/23/2021, 8 Hz predominant rhythm, rare 5 to 6 Hz theta disorganization of the right hemisphere no specific epileptiform discharges     Review of Systems   No headache no chest pain no shortness of breath no nausea vomiting no diarrhea no fever chills  No cough  No diplopia dysarthria dysphagia    Chronic tremor    Memory stabilized    No chest pain.   No abdominal pain  No recent illness    General exam  Blood pressure 129/68, pulse 58, temperature 97  HEENT normal  Lungs clear no wheezing  Heart rate regular  Abdomen soft  Symmetrical pulses  No edema the feet      Neurologic exam  Alert oriented x3  Normal prosody speech  Normal naming  Normal repetition  Normal comprehension  No neglect  Memory okay in the past has had some very subtle difficulty with memory  Formal [place] score 24 out of 30    Cranials 2 through 12  No ophthalmoplegia  No nystagmus  Face symmetrical  Decreased visual acuity left eye chronic  No visual field cut  Tongue twisters okay  Hypophonic voice      Parkinsonian  "features  Slight decrease in blink  Soft voice  Action tremor  Slight voice tremor  Not much head to titubation  Left eyelid heavier than right chronic      Upper extremities  No drift  Mild action tremor  Finger-nose okay    Lower extremities  Test in the seated position good strength    Gait  Got up easier today than last visit  Has a click or problem with his hip  Actually did fairly well getting up from the chair today compared to before  Ambulated without a assistive device up and down the hallway    Reflexes symmetrical  No Swift reflex        Assessment/Plan       1.   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  CTS (carpal tunnel syndrome) (G56.02)        Patient is left-handed       Patient does play piano for most of his life       EMG September 11, 2019 shows moderate left CTS with active and chronic denervation          4.  Essential tremor (G25.0)        Continue propranolol 10 mg tablet 3 times per day as needed      At this time he has:    1. Essential tremor going on as far back as high school.  2. Anxiety disorder that exacerbates the tremor, which involves his head, speech, arms, right greater than left.  3. History of subtle memory changes, some changes on MRI scan and EEG changes in 2016.    1. His B12 level was adequate at 1197, TSH was 1.75.  2. At this time I feel that his anxiety disorder exacerbates an underlying essential tremor.  3. He can use the beta-blocker 10 mg p.r.n. maximum " of three times per day. He is aware of some drowsiness with this.  4. He will follow up on a yearly basis for his tremor  5. We did discuss his laboratory data today also. He has a brother with some B12 deficiency, better to stay on his replacement even though it is a little bit high.  6. Complains of left shoulder pain radicular EMG of the left arm did not show any evidence of radiculopathy on September 11, 2019  7. EMG September 11, 2019 does show evidence of moderate carpal tunnel syndrome on the left patient play piano quite a bit of his life I feel he would get some improvement from some of his symptoms if the carpal tunnel was fixed      Patient has significant anxiety disorder  Patient possibly had a episode 10 to 15 years ago where he passed out versus having amnesia  Transient global amnesia type spell April 1, 2021  Was driving a friend to the hospital  Previously had some mild difficulty with memory 2016    Transient global amnesia versus panic attack versus mild confusion    MRI scan at [facility] unremarkable  EEG August 23, 2021 mild right 5-6 Hz theta disorganization rare no epileptiform activity    Discussed the above with patient  We will follow-up in April for his tremor and other medical issues  Track is difficulty with amnesia    Total care time today 32 minutes    As part of visit today  Reviewed CT scan CTA 6/24/2021  Reviewed MRA MRI 6/25/2021  Primary MD notes 7/6/2021  ER notes 6/24/2021 vertigo  Reviewed labs 7/6/2021

## 2021-08-24 ENCOUNTER — TRANSFERRED RECORDS (OUTPATIENT)
Dept: HEALTH INFORMATION MANAGEMENT | Facility: CLINIC | Age: 81
End: 2021-08-24

## 2021-09-10 ENCOUNTER — TRANSFERRED RECORDS (OUTPATIENT)
Dept: HEALTH INFORMATION MANAGEMENT | Facility: CLINIC | Age: 81
End: 2021-09-10

## 2021-09-12 ENCOUNTER — HEALTH MAINTENANCE LETTER (OUTPATIENT)
Age: 81
End: 2021-09-12

## 2021-10-28 ENCOUNTER — APPOINTMENT (OUTPATIENT)
Dept: RADIOLOGY | Facility: CLINIC | Age: 81
End: 2021-10-28
Payer: COMMERCIAL

## 2021-10-28 ENCOUNTER — HOSPITAL ENCOUNTER (EMERGENCY)
Facility: CLINIC | Age: 81
Discharge: HOME | End: 2021-10-28
Admitting: PHYSICIAN ASSISTANT
Payer: COMMERCIAL

## 2021-10-28 ENCOUNTER — APPOINTMENT (OUTPATIENT)
Dept: CT IMAGING | Facility: CLINIC | Age: 81
End: 2021-10-28
Payer: COMMERCIAL

## 2021-10-28 VITALS
SYSTOLIC BLOOD PRESSURE: 179 MMHG | HEART RATE: 79 BPM | RESPIRATION RATE: 28 BRPM | DIASTOLIC BLOOD PRESSURE: 72 MMHG | OXYGEN SATURATION: 95 % | TEMPERATURE: 98.4 F

## 2021-10-28 DIAGNOSIS — M19.011 PRIMARY OSTEOARTHRITIS OF RIGHT SHOULDER: ICD-10-CM

## 2021-10-28 LAB
ALBUMIN SERPL-MCNC: 4 G/DL (ref 3.5–5)
ALP SERPL-CCNC: 78 U/L (ref 45–120)
ALT SERPL W P-5'-P-CCNC: <9 U/L (ref 0–45)
ANION GAP SERPL CALCULATED.3IONS-SCNC: 9 MMOL/L (ref 5–18)
AST SERPL W P-5'-P-CCNC: 14 U/L (ref 0–40)
ATRIAL RATE - MUSE: 85 BPM
BASOPHILS # BLD AUTO: 0 10E3/UL (ref 0–0.2)
BASOPHILS NFR BLD AUTO: 0 %
BILIRUB SERPL-MCNC: 1.3 MG/DL (ref 0–1)
BUN SERPL-MCNC: 13 MG/DL (ref 8–28)
CALCIUM SERPL-MCNC: 9.5 MG/DL (ref 8.5–10.5)
CHLORIDE BLD-SCNC: 100 MMOL/L (ref 98–107)
CO2 SERPL-SCNC: 29 MMOL/L (ref 22–31)
CREAT SERPL-MCNC: 1.21 MG/DL (ref 0.7–1.3)
DIASTOLIC BLOOD PRESSURE - MUSE: 94 MMHG
EOSINOPHIL # BLD AUTO: 0.2 10E3/UL (ref 0–0.7)
EOSINOPHIL NFR BLD AUTO: 2 %
ERYTHROCYTE [DISTWIDTH] IN BLOOD BY AUTOMATED COUNT: 13.5 % (ref 10–15)
GFR SERPL CREATININE-BSD FRML MDRD: 57 ML/MIN/1.73M2
GLUCOSE BLD-MCNC: 149 MG/DL (ref 70–125)
HCT VFR BLD AUTO: 39.7 % (ref 40–53)
HGB BLD-MCNC: 13.5 G/DL (ref 13.3–17.7)
IMM GRANULOCYTES # BLD: 0 10E3/UL
IMM GRANULOCYTES NFR BLD: 0 %
INTERPRETATION ECG - MUSE: NORMAL
LIPASE SERPL-CCNC: 27 U/L (ref 0–52)
LYMPHOCYTES # BLD AUTO: 0.9 10E3/UL (ref 0.8–5.3)
LYMPHOCYTES NFR BLD AUTO: 10 %
MCH RBC QN AUTO: 29.7 PG (ref 26.5–33)
MCHC RBC AUTO-ENTMCNC: 34 G/DL (ref 31.5–36.5)
MCV RBC AUTO: 87 FL (ref 78–100)
MONOCYTES # BLD AUTO: 0.7 10E3/UL (ref 0–1.3)
MONOCYTES NFR BLD AUTO: 8 %
NEUTROPHILS # BLD AUTO: 7.4 10E3/UL (ref 1.6–8.3)
NEUTROPHILS NFR BLD AUTO: 80 %
NRBC # BLD AUTO: 0 10E3/UL
NRBC BLD AUTO-RTO: 0 /100
P AXIS - MUSE: 52 DEGREES
PLATELET # BLD AUTO: 114 10E3/UL (ref 150–450)
POTASSIUM BLD-SCNC: 4 MMOL/L (ref 3.5–5)
PR INTERVAL - MUSE: 180 MS
PROT SERPL-MCNC: 7.4 G/DL (ref 6–8)
QRS DURATION - MUSE: 90 MS
QT - MUSE: 346 MS
QTC - MUSE: 411 MS
R AXIS - MUSE: 32 DEGREES
RBC # BLD AUTO: 4.54 10E6/UL (ref 4.4–5.9)
SODIUM SERPL-SCNC: 138 MMOL/L (ref 136–145)
SYSTOLIC BLOOD PRESSURE - MUSE: 194 MMHG
T AXIS - MUSE: 46 DEGREES
TROPONIN I SERPL-MCNC: <0.01 NG/ML (ref 0–0.29)
VENTRICULAR RATE- MUSE: 85 BPM
WBC # BLD AUTO: 9.2 10E3/UL (ref 4–11)

## 2021-10-28 PROCEDURE — 71046 X-RAY EXAM CHEST 2 VIEWS: CPT

## 2021-10-28 PROCEDURE — 82040 ASSAY OF SERUM ALBUMIN: CPT | Performed by: EMERGENCY MEDICINE

## 2021-10-28 PROCEDURE — 99285 EMERGENCY DEPT VISIT HI MDM: CPT | Mod: 25

## 2021-10-28 PROCEDURE — 83690 ASSAY OF LIPASE: CPT | Performed by: EMERGENCY MEDICINE

## 2021-10-28 PROCEDURE — 72125 CT NECK SPINE W/O DYE: CPT

## 2021-10-28 PROCEDURE — 85025 COMPLETE CBC W/AUTO DIFF WBC: CPT | Performed by: EMERGENCY MEDICINE

## 2021-10-28 PROCEDURE — 250N000013 HC RX MED GY IP 250 OP 250 PS 637: Performed by: PHYSICIAN ASSISTANT

## 2021-10-28 PROCEDURE — 73030 X-RAY EXAM OF SHOULDER: CPT | Mod: RT

## 2021-10-28 PROCEDURE — 84484 ASSAY OF TROPONIN QUANT: CPT | Performed by: EMERGENCY MEDICINE

## 2021-10-28 PROCEDURE — 93005 ELECTROCARDIOGRAM TRACING: CPT

## 2021-10-28 PROCEDURE — 36415 COLL VENOUS BLD VENIPUNCTURE: CPT | Performed by: EMERGENCY MEDICINE

## 2021-10-28 RX ORDER — ACETAMINOPHEN 325 MG/1
975 TABLET ORAL ONCE
Status: COMPLETED | OUTPATIENT
Start: 2021-10-28 | End: 2021-10-28

## 2021-10-28 RX ORDER — PROPRANOLOL HYDROCHLORIDE 10 MG/1
10 TABLET ORAL ONCE
Status: COMPLETED | OUTPATIENT
Start: 2021-10-28 | End: 2021-10-28

## 2021-10-28 RX ORDER — PAROXETINE 20 MG/1
40 TABLET, FILM COATED ORAL ONCE
Status: COMPLETED | OUTPATIENT
Start: 2021-10-28 | End: 2021-10-28

## 2021-10-28 RX ORDER — ASPIRIN 325 MG
325 TABLET ORAL ONCE
Status: COMPLETED | OUTPATIENT
Start: 2021-10-28 | End: 2021-10-28

## 2021-10-28 RX ADMIN — PAROXETINE 40 MG: 20 TABLET, FILM COATED ORAL at 10:05

## 2021-10-28 RX ADMIN — ASPIRIN 325 MG ORAL TABLET 325 MG: 325 PILL ORAL at 10:05

## 2021-10-28 RX ADMIN — ACETAMINOPHEN 975 MG: 325 TABLET ORAL at 10:05

## 2021-10-28 RX ADMIN — PROPRANOLOL HYDROCHLORIDE 10 MG: 10 TABLET ORAL at 10:05

## 2021-10-28 ASSESSMENT — ENCOUNTER SYMPTOMS
ARTHRALGIAS: 1
NECK PAIN: 1
NUMBNESS: 1
FEVER: 0
CHILLS: 0

## 2021-10-28 NOTE — ED PROVIDER NOTES
EMERGENCY DEPARTMENT ENCOUNTER      NAME: Wilfrid Ortez  AGE: 80 year old male  YOB: 1940  MRN: [id number]  EVALUATION DATE & TIME: 10/28/2021  7:51 AM    PCP: Mayito Carlos    ED PROVIDER: Tesha Lock PA-C      Chief Complaint   Patient presents with     Shoulder Pain         FINAL IMPRESSION:  1. Primary osteoarthritis of right shoulder          MEDICAL DECISION MAKING:    Pertinent Labs & Imaging studies reviewed. (See chart for details)  80-year-old male with a history of osteoarthritis presents to the emergency department for evaluation of right shoulder pain for 5 weeks.  No preceding trauma.  Pain is worsened with movement and is sharp in nature.    Vital signs notable for blood pressure 173/83 likely from pain, all else normal.  On exam, patient appears well and is in no distress.  He has tenderness over the right shoulder without swelling, warmth, erythema.  ROM is limited due to pain but strength appears to be intact.  Patient has tenderness through the right trapezius muscle, no midline cervical spine tenderness.  Strong equal radial pulses, normal sensation of bilateral upper extremities.  Extremities are warm with good color.  With patient's history of arthritis, this could be the cause for his shoulder pain.  Without preceding trauma and 5 weeks of pain, my suspicion for fracture or dislocation is low but will obtain plain films.  Patient does not have any fevers or clinical signs of septic arthritis, bursitis.  Pain does not seem consistent with cervical radiculopathy but given some right posterior neck pain, will obtain CT cervical spine to assess for compression fracture or subluxation.  No neuro deficits in the upper extremities to suggest severe canal compromise.  Patient does not have any anterior neck pain, headache, dizziness that would suggest vascular pathology, ICH, CVA.  Pain seems to be musculoskeletal in etiology.  Will give a dose of Tylenol, as well as  patient's home morning medications.    Labs are notable for normal WBC and hemoglobin, normal electrolytes other than glucose 149, normal kidney function, troponin undetectable.  EKG shows normal sinus rhythm, no acute ischemic changes.  Chest x-ray is normal other than a hiatal hernia.  Shoulder x-ray is negative for fracture or dislocation.  Severe degenerative arthritis of the glenohumeral joint with a 1.5 cm chronic ossific body.  No joint effusion.  CT cervical spine negative for fracture or subluxation.  Degenerative changes throughout, no severe spinal canal narrowing.  Patient feels much improved with Tylenol.  His pain seems to be most consistent with osteoarthritis, which could be flaring from the recent cold weather.  Low suspicion for infectious process.  I do not believe pain is secondary to cervical radiculopathy.  Further emergent work-up is not indicated at this time.  Patient feels comfortable returning home and following up with orthopedics.  He is already established with [facility] and will follow up with them.  I encouraged use of Tylenol and a sling for any discomfort.  I discussed strict return precautions, including worsening of pain, fevers. Patient agreeable and discharged in a comfortable, ambulatory state.     ED COURSE  8:34 AM I met with the patient, obtained history, performed an initial exam, and discussed options and plan for diagnostics and treatment here in the ED.  11:01 AM Rechecked patient. Pain is significantly improved, discussed plans for discharge. Patient agreeable. All questions answered. Patient will be discharged by the RN.    At the conclusion of the encounter I discussed the results of all of the tests and the disposition. The questions were answered. The patient or family acknowledged understanding and was agreeable with the care plan.     MEDICATIONS GIVEN IN THE EMERGENCY:  Medications   acetaminophen (TYLENOL) tablet 975 mg (975 mg Oral Given 10/28/21 1005)    propranolol (INDERAL) tablet 10 mg (10 mg Oral Given 10/28/21 1005)   PARoxetine (PAXIL) tablet 40 mg (40 mg Oral Given 10/28/21 1005)   aspirin (ASA) tablet 325 mg (325 mg Oral Given 10/28/21 1005)       NEW PRESCRIPTIONS STARTED AT TODAY'S ER VISIT  New Prescriptions    No medications on file            =================================================================    HPI    Patient information was obtained from: patient    Use of Interpretor: N/A      Wilfrid Ortez is a 80 year old male with a pertinent history of cervical disc disorder with radiculopathy, essential tremor, memory deficit, Lyme disease, seizures, DMII, and hypertension who presents to this ED via EMS for evaluation of shoulder pain.    Patient reports that he has had sharp, bilateral shoulder pain that radiates up into his neck and down both arms for the past five weeks. He also endorses intermittent numbness to his right pinky and ring fingers. He denies any associated injury or other trauma. He has not yet been seen for this. Otherwise denies any fevers, chills, chest pain, or any other complaints.    REVIEW OF SYSTEMS   Review of Systems   Constitutional: Negative for chills and fever.   Cardiovascular: Negative for chest pain.   Musculoskeletal: Positive for arthralgias (bilateral shoulders, radiate down bilateral arms) and neck pain (radicular).   Neurological: Positive for numbness (intermittently to right pinky and ring fingers).   All other systems reviewed and are negative.       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    Current Outpatient Medications:      albuterol (PROAIR HFA/PROVENTIL HFA/VENTOLIN HFA) 108 (90 Base) MCG/ACT inhaler, Inhale 2 puffs into the lungs every 4 hours as needed for shortness of breath / dyspnea or wheezing (Patient not taking: Reported on 4/2/2021), Disp: 1 Inhaler, Rfl: 0     aspirin (ASA) 325 MG EC tablet, Take 325 mg by mouth daily, Disp: , Rfl:      gabapentin (NEURONTIN) 300 MG capsule, Prn, Disp: , Rfl:      gabapentin (NEURONTIN) 600 MG tablet, Take 600 mg by mouth At Bedtime prn, Disp: , Rfl:      latanoprost (XALATAN) 0.005 % ophthalmic solution, 1 drop, Disp: , Rfl:      LORazepam (ATIVAN) 0.5 MG tablet, Take 0.5 mg by mouth every 6 hours as needed for anxiety Patient takes 0.5 mg tab at nigjht, Disp: , Rfl:      meclizine (ANTIVERT) 25 MG tablet, Take 1 tablet (25 mg) by mouth 3 times daily as needed for dizziness (Patient not taking: Reported on 4/2/2021), Disp: 30 tablet, Rfl: 0     omeprazole (PRILOSEC) 20 MG DR capsule, Take 20 mg by mouth daily, Disp: , Rfl:      PARoxetine (PAXIL) 10 MG tablet, Take 10 mg by mouth At Bedtime , Disp: , Rfl:      PARoxetine (PAXIL) 40 MG tablet, Take 40 mg by mouth every morning Patient takes 2 20mg tab in am, Disp: , Rfl:      propranolol (INDERAL) 10 MG tablet, Take 1 tablet (10 mg) by mouth 2 times daily, Disp: 180 tablet, Rfl: 3     simvastatin (ZOCOR) 20 MG tablet, Take 20 mg by mouth At Bedtime, Disp: , Rfl:       ALLERGIES:  Allergies   Allergen Reactions     Ibuprofen      nausea       FAMILY  HISTORY:  Family History   Problem Relation Age of Onset     Diabetes Brother      Throat cancer Mother      Myocardial Infarction Father      Alcoholism Brother      Cancer Mother         throat     Diabetes Mother      Heart Disease Father        SOCIAL HISTORY:   Social History     Socioeconomic History     Marital status:      Spouse name: Not on file     Number of children: Not on file     Years of education: Not on file     Highest education level: Not on file   Occupational History     Not on file   Tobacco Use     Smoking status: Never Smoker     Smokeless tobacco: Never Used   Substance and Sexual Activity     Alcohol use: Yes     Alcohol/week: 1.0 standard drinks     Comment: Alcoholic Drinks/day: rare     Drug use: No     Sexual activity: Not on file   Other Topics Concern     Parent/sibling w/ CABG, MI or angioplasty before 65F 55M? Not Asked   Social History Narrative     Not on file     Social Determinants of Health     Financial Resource Strain:      Difficulty of Paying Living Expenses:    Food Insecurity:      Worried About Running Out of Food in the Last Year:      Ran Out of Food in the Last Year:    Transportation Needs:      Lack of Transportation (Medical):      Lack of Transportation (Non-Medical):    Physical Activity:      Days of Exercise per Week:      Minutes of Exercise per Session:    Stress:      Feeling of Stress :    Social Connections:      Frequency of Communication with Friends and Family:      Frequency of Social Gatherings with Friends and Family:      Attends Holiness Services:      Active Member of Clubs or Organizations:      Attends Club or Organization Meetings:      Marital Status:    Intimate Partner Violence:      Fear of Current or Ex-Partner:      Emotionally Abused:      Physically Abused:      Sexually Abused:        VITALS:  Patient Vitals for the past 24 hrs:   BP Temp Temp src Pulse Resp SpO2   10/28/21 0900 (!) 179/72 -- -- 79 28 95 %   10/28/21 0800 (!)  173/83 98.4  F (36.9  C) Oral 86 24 96 %       PHYSICAL EXAM    General Appearance: Alert, cooperative, normal speech and facial symmetry, appears stated age, the patient does not appear in distress  Head:  Normocephalic, without obvious abnormality, atraumatic  Eyes: Conjunctiva/corneas clear, EOM's intact, no nystagmus, PERRL  ENT:  Lips, mucosa, and tongue normal; teeth and gums normal, no pharyngeal inflammation, no dysphonia or difficulty swallowing, membranes are moist without pallor  Neck:  Supple, symmetrical, trachea midline, no adenopathy; thyroid is not enlarged and there are no palpable nodules or tenderness; no soft tissue swelling or tenderness  Chest:  No tenderness or deformity  Cardio:  Regular rate and rhythm, S1 and S2 normal, no murmur, rub    or gallop, 2+ pulses symmetric in all extremities  Pulm:  Clear to auscultation bilaterally, respirations unlabored with no accessory muscle use  Back:  Symmetric, no curvature, ROM normal, no CVA tenderness, no spinal tenderness  Abdomen:  Active bowel sounds in all quadrants; abdomen is soft, non-distended with no tenderness to palpation, rebound tenderness, or guarding.   Extremities: Tenderness to the right shoulder without swelling, erythema, warmth.  Tenderness through the right trapezius as well.  No midline cervical spine tenderness or step-offs.  No other right upper extremity tenderness.  Normal strength to bilateral upper extremities. No cyanosis or edema, full function and range of motion, pulses equal in all extremities, normal cap refill, no joint swelling  Skin:  Skin color appears normal; no rashes or lesions noted  Lymph:  Cervical, supraclavicular, and axillary nodes are non-tender  Neuro: Patient is awake, alert, and responsive to voice. No gross motor weaknesses or sensory loss; moves all extremities.    LAB:  All pertinent labs reviewed and interpreted.  Results for orders placed or performed during the hospital encounter of 10/28/21    Chest XR,  PA & LAT    Impression    IMPRESSION: Lungs are clear. Moderate-large hiatus hernia. No pleural effusion or pneumothorax. Normal heart size. Degenerative changes spine.         XR Shoulder Right G/E 3 Views    Impression    IMPRESSION: No acute fracture or dislocation. Severe degenerative arthritis of the glenohumeral joint. 1.5 cm chronic ossific body projecting inferior to the joint may be within distended axillary joint recess. Minimal degenerative arthrosis AC joint.    Degenerative change in the visualized lower cervical spine.    Cervical spine CT w/o contrast    Impression    IMPRESSION:  1.  No acute fracture or posttraumatic subluxation.  2.  Multilevel degenerative disc and facet changes with multilevel spinal canal stenosis most prominent at C6-C7 and multilevel neural foraminal stenosis, see report for level-by-level details.     Comprehensive metabolic panel   Result Value Ref Range    Sodium 138 136 - 145 mmol/L    Potassium 4.0 3.5 - 5.0 mmol/L    Chloride 100 98 - 107 mmol/L    Carbon Dioxide (CO2) 29 22 - 31 mmol/L    Anion Gap 9 5 - 18 mmol/L    Urea Nitrogen 13 8 - 28 mg/dL    Creatinine 1.21 0.70 - 1.30 mg/dL    Calcium 9.5 8.5 - 10.5 mg/dL    Glucose 149 (H) 70 - 125 mg/dL    Alkaline Phosphatase 78 45 - 120 U/L    AST 14 0 - 40 U/L    ALT <9 0 - 45 U/L    Protein Total 7.4 6.0 - 8.0 g/dL    Albumin 4.0 3.5 - 5.0 g/dL    Bilirubin Total 1.3 (H) 0.0 - 1.0 mg/dL    GFR Estimate 57 (L) >60 mL/min/1.73m2   Troponin I (now)   Result Value Ref Range    Troponin I <0.01 0.00 - 0.29 ng/mL   Result Value Ref Range    Lipase 27 0 - 52 U/L   CBC with platelets and differential   Result Value Ref Range    WBC Count 9.2 4.0 - 11.0 10e3/uL    RBC Count 4.54 4.40 - 5.90 10e6/uL    Hemoglobin 13.5 13.3 - 17.7 g/dL    Hematocrit 39.7 (L) 40.0 - 53.0 %    MCV 87 78 - 100 fL    MCH 29.7 26.5 - 33.0 pg    MCHC 34.0 31.5 - 36.5 g/dL    RDW 13.5 10.0 - 15.0 %    Platelet Count 114 (L) 150 - 450 10e3/uL     % Neutrophils 80 %    % Lymphocytes 10 %    % Monocytes 8 %    % Eosinophils 2 %    % Basophils 0 %    % Immature Granulocytes 0 %    NRBCs per 100 WBC 0 <1 /100    Absolute Neutrophils 7.4 1.6 - 8.3 10e3/uL    Absolute Lymphocytes 0.9 0.8 - 5.3 10e3/uL    Absolute Monocytes 0.7 0.0 - 1.3 10e3/uL    Absolute Eosinophils 0.2 0.0 - 0.7 10e3/uL    Absolute Basophils 0.0 0.0 - 0.2 10e3/uL    Absolute Immature Granulocytes 0.0 <=0.0 10e3/uL    Absolute NRBCs 0.0 10e3/uL   ECG 12-LEAD WITH MUSE (LHE)   Result Value Ref Range    Systolic Blood Pressure 194 mmHg    Diastolic Blood Pressure 94 mmHg    Ventricular Rate 85 BPM    Atrial Rate 85 BPM    MN Interval 180 ms    QRS Duration 90 ms     ms    QTc 411 ms    P Axis 52 degrees    R AXIS 32 degrees    T Axis 46 degrees    Interpretation ECG       Sinus rhythm  Normal ECG  When compared with ECG of 20-APR-2021 09:03,  No significant change was found  Confirmed by SEE ED PROVIDER NOTE FOR, ECG INTERPRETATION ([id number]),  REGIS KUMAR ([id number]) on 10/28/2021 8:31:17 AM         RADIOLOGY:  Reviewed all pertinent imaging. Please see official radiology report.  Cervical spine CT w/o contrast   Final Result   IMPRESSION:   1.  No acute fracture or posttraumatic subluxation.   2.  Multilevel degenerative disc and facet changes with multilevel spinal canal stenosis most prominent at C6-C7 and multilevel neural foraminal stenosis, see report for level-by-level details.         XR Shoulder Right G/E 3 Views   Final Result   IMPRESSION: No acute fracture or dislocation. Severe degenerative arthritis of the glenohumeral joint. 1.5 cm chronic ossific body projecting inferior to the joint may be within distended axillary joint recess. Minimal degenerative arthrosis AC joint.      Degenerative change in the visualized lower cervical spine.       Chest XR,  PA & LAT   Final Result   IMPRESSION: Lungs are clear. Moderate-large hiatus hernia. No pleural effusion or pneumothorax.  Normal heart size. Degenerative changes spine.                   EKG:    Performed at: 0758    Impression: Normal sinus rhythm    Rate: 85  Rhythm: Sinus  WY Interval: 180  QRS Interval: 90  QTc Interval: 411  ST Changes: None  Comparison: 20-APR-2021; no change noted    I have independently reviewed and interpreted the EKG(s) documented above.  Reviewed with Dr. Shade ARRINGTON, Orquidea Sun, am serving as a scribe to document services personally performed by Tesha Lock PA-C based on my observation and the provider's statements to me. I, Tesha Lock PA-C attest that Orquidea Sun is acting in a scribe capacity, has observed my performance of the services and has documented them in accordance with my direction.    Tesha Lock PA-C  Emergency Medicine  [facility]     Tesha Lock PA-C  10/28/21 1151

## 2021-10-28 NOTE — ED NOTES
Bed: [facility]ED-11  Expected date: 10/28/21  Expected time: 7:51 AM  Means of arrival: Ambulance  Comments:  Back pain

## 2021-10-28 NOTE — DISCHARGE INSTRUCTIONS
You were seen today in the ER for shoulder pain.  All of your labs are normal.  Your images show severe arthritis in the shoulder, which could be causing your pain.  You could have a soft tissue injury as well.  We gave you Tylenol here, which seemed to help with your pain.  Continue taking this at home.  Use the sling for any discomfort.  Please call [facility] today to schedule follow-up appointment.  Return to the ER for worsening of pain, fevers, or if you develop any new, concerning symptoms.

## 2021-11-17 ENCOUNTER — OFFICE VISIT (OUTPATIENT)
Dept: INTERNAL MEDICINE | Facility: CLINIC | Age: 81
End: 2021-11-17
Payer: COMMERCIAL

## 2021-11-17 VITALS
WEIGHT: 157.8 LBS | RESPIRATION RATE: 16 BRPM | BODY MASS INDEX: 25.47 KG/M2 | HEART RATE: 76 BPM | SYSTOLIC BLOOD PRESSURE: 120 MMHG | DIASTOLIC BLOOD PRESSURE: 62 MMHG

## 2021-11-17 DIAGNOSIS — M54.12 CERVICAL RADICULOPATHY: Primary | ICD-10-CM

## 2021-11-17 PROCEDURE — 99213 OFFICE O/P EST LOW 20 MIN: CPT | Mod: 25 | Performed by: NURSE PRACTITIONER

## 2021-11-17 PROCEDURE — G0008 ADMIN INFLUENZA VIRUS VAC: HCPCS | Performed by: NURSE PRACTITIONER

## 2021-11-17 PROCEDURE — 90662 IIV NO PRSV INCREASED AG IM: CPT | Performed by: NURSE PRACTITIONER

## 2021-11-17 RX ORDER — GABAPENTIN 300 MG/1
CAPSULE ORAL
Qty: 90 CAPSULE | Refills: 1 | COMMUNITY
Start: 2021-11-17 | End: 2022-08-30

## 2021-11-17 NOTE — PROGRESS NOTES
Assessment & Plan   Problem List Items Addressed This Visit     None      Visit Diagnoses     Cervical radiculopathy    -  Primary    Relevant Medications    gabapentin (NEURONTIN) 300 MG capsule      Currently not well-controlled increase gabapentin to 300 mg 3 times daily follow-up with primary in 1 month.                 Return in about 4 weeks (around 12/15/2021).    Fauzia Wells NP  [facility] CAYETANO Carrion is a 80 year old who presents for the following health issues     [facility]     Hospital f/u for pain in right shoulder--6/7 vertebra pinching on nerve per pt; he reports he is now doing PT--has had one session; if doesn't get better Dr. Rodriguez from [facility] will do injection          Hospital Follow-up Visit:    Hospital/Nursing Home/IP Rehab Facility: [facility]  Date of Admission: 10/28/2021  Date of Discharge: 10/28/2021  Reason(s) for Admission: Shoulder Pain      Was your hospitalization related to COVID-19? No   Problems taking medications regularly:  None  Medication changes since discharge: None  Problems adhering to non-medication therapy:  None    Summary of hospitalization:  [facility] discharge summary reviewed  Diagnostic Tests/Treatments reviewed.  Follow up needed: none  Other Healthcare Providers Involved in Patient s Care:         None  Update since discharge: stable. Post Discharge Medication Reconciliation: discharge medications reconciled and changed, per note/orders.  Plan of care communicated with patient                    Review of Systems   Unremarkable other than listed above and below         Objective    /62 (BP Location: Right arm, Patient Position: Sitting, Cuff Size: Adult Regular)   Pulse 76   Resp 16   Wt 71.6 kg (157 lb 12.8 oz)   BMI 25.47 kg/m    Body mass index is 25.47 kg/m .  Physical Exam   GENERAL: healthy, alert and no distress  NECK: no adenopathy  RESP: respirations regular  nonlabored  MS continued cervical neck tenderness as well as into the right shoulder with reduction in range of motion secondary to the discomfort tenderness noted of the right trapezius though no midline cervical neck tenderness upon palpation strength is equal throughout pulses intact  PSYCH: mentation appears normal, affect normal/bright

## 2021-12-03 ENCOUNTER — TELEPHONE (OUTPATIENT)
Dept: INTERNAL MEDICINE | Facility: CLINIC | Age: 81
End: 2021-12-03
Payer: COMMERCIAL

## 2021-12-03 DIAGNOSIS — I10 ESSENTIAL HYPERTENSION: Primary | ICD-10-CM

## 2021-12-03 NOTE — TELEPHONE ENCOUNTER
Pt is having an issue with vertigo when he gets on a table for examines with PT.  It started about a week ago, and yesterday at his PT appointment when he had to lay down the vertigo was horrible.    Pt states he has used Meclizine in the past. Requesting new RX.    [facility] is the preferred pharmacy on [address].     Last office visit with PCP = 11/20/21  Next office visit with PCP = 12/27/21

## 2021-12-08 RX ORDER — MECLIZINE HYDROCHLORIDE 25 MG/1
25 TABLET ORAL 3 TIMES DAILY PRN
Qty: 90 TABLET | Refills: 11 | Status: SHIPPED | OUTPATIENT
Start: 2021-12-08 | End: 2023-04-20

## 2021-12-08 NOTE — TELEPHONE ENCOUNTER
Please see below message. Rx pended for review. Please advise if appropriate and able to send with current dose or if there is anything else to relay to patient   Sylvester Milligan CMA on 12/8/2021 at 2:36 PM

## 2021-12-27 ENCOUNTER — OFFICE VISIT (OUTPATIENT)
Dept: INTERNAL MEDICINE | Facility: CLINIC | Age: 81
End: 2021-12-27
Payer: COMMERCIAL

## 2021-12-27 VITALS
DIASTOLIC BLOOD PRESSURE: 76 MMHG | SYSTOLIC BLOOD PRESSURE: 146 MMHG | HEART RATE: 67 BPM | WEIGHT: 158 LBS | BODY MASS INDEX: 26.33 KG/M2 | OXYGEN SATURATION: 97 % | HEIGHT: 65 IN

## 2021-12-27 DIAGNOSIS — E11.8 TYPE 2 DIABETES MELLITUS WITH COMPLICATION, WITHOUT LONG-TERM CURRENT USE OF INSULIN (H): Primary | ICD-10-CM

## 2021-12-27 LAB
CHOLEST SERPL-MCNC: 166 MG/DL
FASTING STATUS PATIENT QL REPORTED: YES
FASTING STATUS PATIENT QL REPORTED: YES
GLUCOSE BLD-MCNC: 119 MG/DL (ref 70–125)
HBA1C MFR BLD: 6.6 % (ref 0–5.6)
HDLC SERPL-MCNC: 52 MG/DL
LDLC SERPL CALC-MCNC: 93 MG/DL
TRIGL SERPL-MCNC: 106 MG/DL

## 2021-12-27 PROCEDURE — 99214 OFFICE O/P EST MOD 30 MIN: CPT | Performed by: INTERNAL MEDICINE

## 2021-12-27 PROCEDURE — 83036 HEMOGLOBIN GLYCOSYLATED A1C: CPT | Performed by: INTERNAL MEDICINE

## 2021-12-27 PROCEDURE — 80061 LIPID PANEL: CPT | Performed by: INTERNAL MEDICINE

## 2021-12-27 PROCEDURE — 82947 ASSAY GLUCOSE BLOOD QUANT: CPT | Performed by: INTERNAL MEDICINE

## 2021-12-27 PROCEDURE — 36415 COLL VENOUS BLD VENIPUNCTURE: CPT | Performed by: INTERNAL MEDICINE

## 2021-12-27 ASSESSMENT — MIFFLIN-ST. JEOR: SCORE: 1358.56

## 2021-12-27 ASSESSMENT — PATIENT HEALTH QUESTIONNAIRE - PHQ9: SUM OF ALL RESPONSES TO PHQ QUESTIONS 1-9: 8

## 2021-12-27 NOTE — LETTER
December 28, 2021      Wilfrid BOWMAN Neelima  [address]   [place] [address]        Dear ,    We are writing to inform you of your test results.    All very good       Resulted Orders   Hemoglobin A1c   Result Value Ref Range    Hemoglobin A1C 6.6 (H) 0.0 - 5.6 %      Comment:      Normal <5.7%   Prediabetes 5.7-6.4%    Diabetes 6.5% or higher     Note: Adopted from ADA consensus guidelines.   Lipid panel reflex to direct LDL Fasting   Result Value Ref Range    Cholesterol 166 <=199 mg/dL    Triglycerides 106 <=149 mg/dL    Direct Measure HDL 52 >=40 mg/dL      Comment:      HDL Cholesterol Reference Range:     0-2 years:   No reference ranges established for patients under 2 years old  at [facility] for lipid analytes.    2-8 years:  Greater than 45 mg/dL     18 years and older:   Female: Greater than or equal to 50 mg/dL   Male:   Greater than or equal to 40 mg/dL    LDL Cholesterol Calculated 93 <=129 mg/dL    Patient Fasting > 8hrs? Yes    Glucose   Result Value Ref Range    Glucose 119 70 - 125 mg/dL    Patient Fasting > 8hrs? Yes        If you have any questions or concerns, please call the clinic at the number listed above.       Sincerely,      Mayito Carlos MD

## 2021-12-27 NOTE — LETTER
December 27, 2021      Wilfrid Ortez  [address]   [place] [address]        Dear ,    We are writing to inform you of your test results.    Very good!    Resulted Orders   Hemoglobin A1c   Result Value Ref Range    Hemoglobin A1C 6.6 (H) 0.0 - 5.6 %      Comment:      Normal <5.7%   Prediabetes 5.7-6.4%    Diabetes 6.5% or higher     Note: Adopted from ADA consensus guidelines.       If you have any questions or concerns, please call the clinic at the number listed above.       Sincerely,      Mayito Carlos MD

## 2021-12-27 NOTE — PROGRESS NOTES
"Assessment/Plan:    Diabetes mellitus type 2 check today A1c glucose and lipid panel stable.    Hypertension with mild systolic elevation 160/78 recheck 146/76 reemphasized salt restriction and diet weight loss and exercise no change in meds.    Covid test negative by PCR Thursday 2 weeks ago nasal smear all clear.    Occasional wheezing noted by patient negative on exam.    Continue physical therapy at [facility].    Hyperlipidemia by statin therapy no history of target organ damage related to same.    25 minutes spent on the date of the encounter doing chart review, patient visit and documentation     Subjective:  Wilfrid Otrez is a 81 year old male presents for the following health issues as above.  Physical therapy at [facility].  Negative Covid test 2 Thursdays ago.  Occasional wheezing no but no shortness of breath no chest pain.    2 weeks ago Thursday the patient had a negative Covid test nasal smear PCR.  Discussed.    ROS:  No blood in stool urine or sputum medication list reviewed reconciled.  Non-smoker no excess alcohol.  Has been exposed to Covid.  Necessitating Covid testing negative.    Objective:  BP (!) 146/76   Pulse 67   Ht 1.651 m (5' 5\")   Wt 71.7 kg (158 lb)   SpO2 97%   BMI 26.29 kg/m    Chest clear to auscultation and percussion.  Heart tones regular rhythm without murmur rub or gallop.  Abdomen soft nontender no organomegaly.  No peritoneal signs.  Extremities free of edema cyanosis or clubbing.  Neck veins nondistended no thyromegaly or scleral icterus noted, carotids full.  Skin warm and dry easily conversant good spirited.  Normal intelligence.  Neurologically intact no gross localizing findings.  No wheezing or rales heard on examination no rhonchi.  Anterior posterior chest examines closely.  "

## 2022-01-03 NOTE — TELEPHONE ENCOUNTER
PT is requesting new RX for Omeprazole and Simvastatin.  [facility] advised pt to call clinic.    Simbastatin 20 mg  Omeprzole 20 mg    Pt would prefer 90 day qunatities    [facility] on [address] is the preferred pharmacy.

## 2022-01-06 RX ORDER — SIMVASTATIN 20 MG
20 TABLET ORAL AT BEDTIME
OUTPATIENT
Start: 2022-01-06

## 2022-01-06 NOTE — TELEPHONE ENCOUNTER
"Due to medication information not transferring due to SE[facility] please review the medication information prior to signing to ensure accuracy.     Disp Refills Start End ANN   simvastatin (ZOCOR) 20 MG tablet 90 tablet 3 7/5/2021  No   Sig: TAKE 1 TABLET BY MOUTH EVERY EVENING   Sent to pharmacy as: simvastatin 20 mg tablet (ZOCOR)   E-Prescribing Status: Receipt confirmed by pharmacy (7/5/2021 12:40 PM CDT)     Last Written Prescription Date:  07/05/2021-simvastatin  Last Fill Quantity: 90,  # refills: 3   Last office visit provider:  12/27/2021 with Dr Carter.     Disp Refills Start End ANN   omeprazole (PRILOSEC) 20 MG capsule 90 capsule 3 4/10/2021  No   Sig: TAKE 1 CAPSULE(20 MG) BY MOUTH DAILY   Sent to pharmacy as: omeprazole 20 mg capsule,delayed release (PriLOSEC)   E-Prescribing Status: Receipt confirmed by pharmacy (4/10/2021  7:58 AM CDT)     Last Written Prescription Date:  04/28/2021  Last Fill Quantity: 90,  # refills: 3   Last office visit provider:  12/27/2021 with Dr Carter      Requested Prescriptions   Pending Prescriptions Disp Refills     simvastatin (ZOCOR) 20 MG tablet       Sig: Take 1 tablet (20 mg) by mouth At Bedtime       Statins Protocol Passed - 1/3/2022  2:38 PM        Passed - LDL on file in past 12 months     Recent Labs   Lab Test 12/27/21  1006   LDL 93             Passed - No abnormal creatine kinase in past 12 months     No lab results found.             Passed - Recent (12 mo) or future (30 days) visit within the authorizing provider's specialty     Patient has had an office visit with the authorizing provider or a provider within the authorizing providers department within the previous 12 mos or has a future within next 30 days. See \"Patient Info\" tab in inbasket, or \"Choose Columns\" in Meds & Orders section of the refill encounter.              Passed - Medication is active on med list        Passed - Patient is age 18 or older           omeprazole (PRILOSEC) 20 MG DR capsule       " "Sig: Take 1 capsule (20 mg) by mouth daily       PPI Protocol Passed - 1/3/2022  2:38 PM        Passed - Not on Clopidogrel (unless Pantoprazole ordered)        Passed - No diagnosis of osteoporosis on record        Passed - Recent (12 mo) or future (30 days) visit within the authorizing provider's specialty     Patient has had an office visit with the authorizing provider or a provider within the authorizing providers department within the previous 12 mos or has a future within next 30 days. See \"Patient Info\" tab in inbasket, or \"Choose Columns\" in Meds & Orders section of the refill encounter.              Passed - Medication is active on med list        Passed - Patient is age 18 or older             Jelena Landers 01/06/22 1:15 PM  "

## 2022-01-12 VITALS — BODY MASS INDEX: 25.06 KG/M2 | HEIGHT: 65 IN | WEIGHT: 150.4 LBS

## 2022-01-17 ENCOUNTER — TRANSFERRED RECORDS (OUTPATIENT)
Dept: HEALTH INFORMATION MANAGEMENT | Facility: CLINIC | Age: 82
End: 2022-01-17
Payer: COMMERCIAL

## 2022-01-18 VITALS
HEIGHT: 66 IN | SYSTOLIC BLOOD PRESSURE: 126 MMHG | OXYGEN SATURATION: 98 % | DIASTOLIC BLOOD PRESSURE: 78 MMHG | WEIGHT: 154 LBS | BODY MASS INDEX: 24.75 KG/M2 | HEART RATE: 66 BPM

## 2022-01-18 VITALS
OXYGEN SATURATION: 97 % | SYSTOLIC BLOOD PRESSURE: 132 MMHG | WEIGHT: 152 LBS | TEMPERATURE: 96.9 F | DIASTOLIC BLOOD PRESSURE: 72 MMHG | HEIGHT: 65 IN | HEART RATE: 72 BPM | BODY MASS INDEX: 25.33 KG/M2

## 2022-01-18 VITALS
OXYGEN SATURATION: 96 % | DIASTOLIC BLOOD PRESSURE: 70 MMHG | SYSTOLIC BLOOD PRESSURE: 128 MMHG | HEART RATE: 67 BPM | HEIGHT: 66 IN | TEMPERATURE: 97 F | BODY MASS INDEX: 24.59 KG/M2 | WEIGHT: 153 LBS

## 2022-01-18 VITALS
HEIGHT: 66 IN | TEMPERATURE: 97.2 F | WEIGHT: 153 LBS | OXYGEN SATURATION: 98 % | BODY MASS INDEX: 24.59 KG/M2 | DIASTOLIC BLOOD PRESSURE: 76 MMHG | SYSTOLIC BLOOD PRESSURE: 136 MMHG | HEART RATE: 66 BPM

## 2022-01-18 ASSESSMENT — PATIENT HEALTH QUESTIONNAIRE - PHQ9: SUM OF ALL RESPONSES TO PHQ QUESTIONS 1-9: 4

## 2022-02-03 ENCOUNTER — TRANSFERRED RECORDS (OUTPATIENT)
Dept: HEALTH INFORMATION MANAGEMENT | Facility: CLINIC | Age: 82
End: 2022-02-03
Payer: COMMERCIAL

## 2022-02-03 LAB — RETINOPATHY: NEGATIVE

## 2022-02-07 ENCOUNTER — NURSE TRIAGE (OUTPATIENT)
Dept: NURSING | Facility: CLINIC | Age: 82
End: 2022-02-07
Payer: COMMERCIAL

## 2022-02-07 NOTE — TELEPHONE ENCOUNTER
Triage call:     Patient calling with abnormal sweating around his hip and waist area when he wakes up in the morning.   Symptoms started 1 month ago   He is a diabetic but his blood sugars are controlled in the morning and he does not take medication   He does not have any issues with incontinence.   History of hip surgery 1 year ago   He was in pain last night and has increased dosing of gabapentin recently.     Per protocol, patient should be seen within 3 days for sweating of unknown cause. Transferred to scheduling to discuss appointment in clinic or virtual appointment. Patient verbalizes understanding and agreement with plan of care.     Priti Thomas RN   02/07/22 4:04 PM  [facility] Nurse Advisor       Reason for Disposition    [1] MODERATE sweating (e.g., interferes with normal activities like work or school) AND [2] cause unknown AND [3]  new onset in the last 4 weeks    Additional Information    Negative: Shock suspected (e.g., cold/pale/clammy skin, too weak to stand, low BP, rapid pulse)    Negative: Sounds like a life-threatening emergency to the triager    Negative: Difficulty breathing    Negative: Patient sounds very sick or weak to the triager    Negative: [1] SEVERE sweating (e.g., drenched with sweat) AND [2] cause unknown    Negative: [1] NIGHT SWEATS occur (e.g., drenching sweat that occurs at night and has to change bed clothes or bed sheets) AND [2] cause unknown    Negative: [1] MODERATE sweating (e.g., interferes with normal activities like work or school) AND [2] possibly related to new medication or change in medication dosage    Protocols used: NIFVDACA-S-NY

## 2022-02-25 ENCOUNTER — TRANSFERRED RECORDS (OUTPATIENT)
Dept: HEALTH INFORMATION MANAGEMENT | Facility: CLINIC | Age: 82
End: 2022-02-25
Payer: COMMERCIAL

## 2022-03-25 ENCOUNTER — LAB (OUTPATIENT)
Dept: URGENT CARE | Facility: URGENT CARE | Age: 82
End: 2022-03-25
Payer: COMMERCIAL

## 2022-03-25 DIAGNOSIS — Z20.822 SUSPECTED COVID-19 VIRUS INFECTION: ICD-10-CM

## 2022-03-25 PROCEDURE — U0005 INFEC AGEN DETEC AMPLI PROBE: HCPCS

## 2022-03-25 PROCEDURE — U0003 INFECTIOUS AGENT DETECTION BY NUCLEIC ACID (DNA OR RNA); SEVERE ACUTE RESPIRATORY SYNDROME CORONAVIRUS 2 (SARS-COV-2) (CORONAVIRUS DISEASE [COVID-19]), AMPLIFIED PROBE TECHNIQUE, MAKING USE OF HIGH THROUGHPUT TECHNOLOGIES AS DESCRIBED BY CMS-2020-01-R: HCPCS

## 2022-03-26 ENCOUNTER — TELEPHONE (OUTPATIENT)
Dept: URGENT CARE | Facility: URGENT CARE | Age: 82
End: 2022-03-26
Payer: COMMERCIAL

## 2022-03-26 LAB — SARS-COV-2 RNA RESP QL NAA+PROBE: POSITIVE

## 2022-03-26 NOTE — TELEPHONE ENCOUNTER
Patient classified as COVID treatment eligible by Epic high risk algorithm:  Yes    Coronavirus (COVID-19) Notification    Reason for call  Notify of POSITIVE COVID-19 lab result, assess symptoms,  review [facility] recommendations    Lab Result   Lab test for 2019-nCoV rRt-PCR or SARS-COV-2 PCR  Oropharyngeal AND/OR nasopharyngeal swabs were POSITIVE for 2019-nCoV RNA [OR] SARS-COV-2 RNA (COVID-19) RNA     We have been unable to reach patient by phone at this time to notify of their Positive COVID-19 result.    Left voicemail message requesting a call back to [phone] [facility] for results.        A Positive COVID-19 letter will be sent via [employer] or the mail. (Exception, no letters sent to Presurgerical/Preprocedure Patients)    Caron Lopez LPN

## 2022-03-27 NOTE — TELEPHONE ENCOUNTER
Coronavirus (COVID-19) Notification    Caller Name (Patient, parent, daughter/son, grandparent, etc)  patient    Reason for call  Notify of Positive Coronavirus (COVID-19) lab results, assess symptoms,  review [facility] recommendations    Lab Result    Lab test:  2019-nCoV rRt-PCR or SARS-CoV-2 PCR    Oropharyngeal AND/OR nasopharyngeal swabs is POSITIVE for 2019-nCoV RNA/SARS-COV-2 PCR (COVID-19 virus)      Gather patient reported symptoms   Assessment   Current Symptoms at time of phone call, reported by patient: (if no symptoms, document: No symptoms] cough   Date of symptom(s) onset (if applicable) 3/21/21     If at time of call, Patients symptoms have worsened, the Patient should contact 911 or have someone drive them to Emergency Dept promptly:      If Patient calling 911, inform 911 personal that you have tested positive for the Coronavirus (COVID-19).  Place mask on and await 911 to arrive.    If Emergency Dept, If possible, please have another adult drive you to the Emergency Dept but you need to wear mask when in contact with other people.      Treatment Options:   Patient classified as COVID treatment eligible by Epic high risk algorithm: Yes  Is the patient symptomatic at the time of result notification? No    Review information with Patient    Your result was positive. This means you have COVID-19 (coronavirus).    How can I protect others?    These guidelines are for isolating before returning to work, school or .    If you DO have symptoms    Stay home and away from others     For at least 5 days after your symptoms started, AND    You are fever free for 24 hours (with no medicine that reduces fever), AND    Your other symptoms are better    Wear a mask for 10 full days anytime you are around others    If you DON'T have symptoms    Stay home and away from others for at least 5 days after your positive test    Wear a mask for 10 full days anytime you are around others    There may be  different guidelines for healthcare facilities.  Please check with the specific sites before arriving.    If you have been told by a doctor that you were severely ill with COVID-19 or are immunocompromised, you should isolate for at least 10 days.    You should not go back to work until you meet the guidelines above for ending your home isolation. You don't need to be retested for COVID-19 before going back to work--studies show that you won't spread the virus if it's been at least 10 days since your symptoms started (or 20 days, if you have a weak immune system).    Employers, schools, and daycares: This is an official notice for this person's medical guidelines for returning in-person.  They must meet the above guidelines before going back to work, school or  in person.    You will receive a positive COVID-19 letter via [employer] or the mail soon with additional self-care information (exception, no letters will be sent to presurgical/preprocedure patients).    Would you like me to review some of that information with you now?  No    If you were tested for an upcoming procedure, please contact your provider for next steps.    Caron Lopez LPN

## 2022-03-28 DIAGNOSIS — Z00.00 ROUTINE GENERAL MEDICAL EXAMINATION AT A HEALTH CARE FACILITY: Primary | ICD-10-CM

## 2022-03-30 NOTE — TELEPHONE ENCOUNTER
"Routing refill request to provider for review/approval because:  Pharmacy requesting DR capsule, prescription does not match medication active on list.     Last Written Prescription Date:  4/10/21  Last Fill Quantity: 90,  # refills: 3   Last office visit provider:  12/27/21         Requested Prescriptions   Pending Prescriptions Disp Refills     omeprazole (PRILOSEC) 20 MG DR capsule [Pharmacy Med Name: OMEPRAZOLE 20MG CAPSULES] 90 capsule      Sig: TAKE 1 CAPSULE(20 MG) BY MOUTH DAILY       PPI Protocol Passed - 3/28/2022  6:02 AM        Passed - Not on Clopidogrel (unless Pantoprazole ordered)        Passed - No diagnosis of osteoporosis on record        Passed - Recent (12 mo) or future (30 days) visit within the authorizing provider's specialty     Patient has had an office visit with the authorizing provider or a provider within the authorizing providers department within the previous 12 mos or has a future within next 30 days. See \"Patient Info\" tab in inbasket, or \"Choose Columns\" in Meds & Orders section of the refill encounter.              Passed - Medication is active on med list        Passed - Patient is age 18 or older             Terra Schwartz RN 03/30/22 9:43 AM  "

## 2022-03-30 NOTE — TELEPHONE ENCOUNTER
"Pharmacy Change     Last Written Prescription Date:  3/30/22  Last Fill Quantity: 90,  # refills: 2   Last office visit provider:  12/27/21     Requested Prescriptions   Pending Prescriptions Disp Refills     omeprazole (PRILOSEC) 20 MG DR capsule [Pharmacy Med Name: OMEPRAZOLE 20MG CAPSULES] 90 capsule      Sig: TAKE 1 CAPSULE(20 MG) BY MOUTH DAILY       PPI Protocol Passed - 3/28/2022  1:14 PM        Passed - Not on Clopidogrel (unless Pantoprazole ordered)        Passed - No diagnosis of osteoporosis on record        Passed - Recent (12 mo) or future (30 days) visit within the authorizing provider's specialty     Patient has had an office visit with the authorizing provider or a provider within the authorizing providers department within the previous 12 mos or has a future within next 30 days. See \"Patient Info\" tab in inbasket, or \"Choose Columns\" in Meds & Orders section of the refill encounter.              Passed - Medication is active on med list        Passed - Patient is age 18 or older             Terra Schwartz RN 03/30/22 2:39 PM    "

## 2022-04-04 RX ORDER — SIMVASTATIN 20 MG
TABLET ORAL
Qty: 90 TABLET | OUTPATIENT
Start: 2022-04-04

## 2022-04-04 NOTE — TELEPHONE ENCOUNTER
Refill request refused - patient should have refills through 7/5/22.    Simvistatin 20mg was refilled on 7/5/2021 - qty-90, ref-3        Val Bennett RN  04/04/22 3:18 PM  [facility] Nurse Advisor

## 2022-04-05 ENCOUNTER — NURSE TRIAGE (OUTPATIENT)
Dept: NURSING | Facility: CLINIC | Age: 82
End: 2022-04-05
Payer: COMMERCIAL

## 2022-04-05 DIAGNOSIS — J98.01 ACUTE BRONCHOSPASM: ICD-10-CM

## 2022-04-05 RX ORDER — ALBUTEROL SULFATE 90 UG/1
2 AEROSOL, METERED RESPIRATORY (INHALATION) EVERY 4 HOURS PRN
Qty: 18 G | Refills: 1 | Status: SHIPPED | OUTPATIENT
Start: 2022-04-05 | End: 2022-08-30

## 2022-04-05 NOTE — TELEPHONE ENCOUNTER
Routing to covering provider, Dr. Janell Hendrickson Jr., [facility] on 4/5/2022 at 4:13 PM

## 2022-04-05 NOTE — TELEPHONE ENCOUNTER
Has covid. Tested positive 3/26/22    Wheezing and asking for refill on albuterol inhaler.    Can speak in full sentences. No chest pain or lung heaviness. No fever or HA. Cough persists.    Says he feels he is improving overall.    Uses [facility] in [facility] on [address].    Lilly BOWMAN [facility] Nurse Advisor

## 2022-04-11 ENCOUNTER — NURSE TRIAGE (OUTPATIENT)
Dept: NURSING | Facility: CLINIC | Age: 82
End: 2022-04-11
Payer: COMMERCIAL

## 2022-04-11 NOTE — TELEPHONE ENCOUNTER
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 Patent says he slept ok through the night, had breakfast as usual and dropped someone off but was feeling unlike himself. Patient says after he came back home at 11 am, he laid down until 12:30pm. Patient reports he didn't remember that morning when he woke up. Patient says he forgot about the person he dropped off until that person called to see if patient was coming back to pick him up.    Reviewed care advice with caller per RN triage protocol.  Caller was informed to be seen today.  Caller verbalized understanding.        Reason for Disposition    Brief confusion (now gone)    Additional Information    Negative: Fever > 100.5 F (38.1 C)    Negative: Patient sounds very sick or weak to the triager    Negative: Headache or vomiting    Negative: Stiff neck (can't touch chin to chest)    Negative: Very strange or paranoid behavior    Negative: Difficult to awaken or acting confused (disoriented, slurred speech) and has diabetes    Negative: Difficult to awaken or acting confused (disoriented, slurred speech) and new onset    Negative: Weakness of the face, arm, or leg on one side of the body and new onset    Negative: Numbness of the face, arm, or leg on one side of the body and new onset    Negative: Loss of speech or garbled speech and new onset    Negative: Difficulty breathing and bluish (or gray) lips or face    Negative: Shock suspected (e.g., cold/pale/clammy skin, too weak to stand, low BP, rapid pulse)    Negative: Seeing or hearing or feeling things that are not there (i.e., auditory, visual, or tactile hallucinations)    Negative: Followed a head injury    Negative: Drug overdose suspected    Negative: Sounds like a life-threatening emergency to the triager    Protocols used: CONFUSION - DELIRIUM-A-OH       I certify as stated above.

## 2022-04-11 NOTE — TELEPHONE ENCOUNTER
On 3/25 patient tested positive for covid.  Patient stayed home, started to feel better.  Patient went out.  Patient wanted to go to [facility] so he tested himself.  He was positive.  Patient wonders if his quarantining starts all over again.  Patient states he feels much better.  Does still have his lost of taste and smell.    Explained to patient that we would not recommend you retesting for 90 days after your positive test.  Patient then asked does he need to retest in 90 days.  I explained he does not unless he was exposed or has symptoms.    Patient is wondering if he can go out now.  I explained yes he can go out and done quarantine since he feels better and he is past his 10 days.    Shawna Mancia RN   04/11/22 5:00 PM  [facility] Nurse Advisor    Reason for Disposition    COVID-19 Testing, questions about    Additional Information    Negative: SEVERE difficulty breathing (e.g., struggling for each breath, speaks in single words)    Negative: Difficult to awaken or acting confused (e.g., disoriented, slurred speech)    Negative: Bluish (or gray) lips or face now    Negative: Shock suspected (e.g., cold/pale/clammy skin, too weak to stand, low BP, rapid pulse)    Negative: Sounds like a life-threatening emergency to the triager    Negative: [1] Diagnosed or suspected COVID-19 AND [2] symptoms lasting 3 or more weeks    Negative: [1] COVID-19 exposure AND [2] no symptoms    Negative: COVID-19 vaccine reaction suspected (e.g., fever, headache, muscle aches) occurring 1 to 3 days after getting vaccine    Negative: COVID-19 vaccine, questions about    Negative: [1] Lives with someone known to have influenza (flu test positive) AND [2] flu-like symptoms (e.g., cough, runny nose, sore throat, SOB; with or without fever)    Negative: [1] Adult with possible COVID-19 symptoms AND [2] triager concerned about severity of symptoms or other causes    Negative: COVID-19 and breastfeeding, questions about     Negative: SEVERE or constant chest pain or pressure  (Exception: Mild central chest pain, present only when coughing.)    Negative: MODERATE difficulty breathing (e.g., speaks in phrases, SOB even at rest, pulse 100-120)    Negative: [1] Headache AND [2] stiff neck (can't touch chin to chest)    Negative: Oxygen level (e.g., pulse oximetry) 90 percent or lower    Negative: Chest pain or pressure    Negative: Patient sounds very sick or weak to the triager    Negative: MILD difficulty breathing (e.g., minimal/no SOB at rest, SOB with walking, pulse <100)    Negative: Fever > 103 F (39.4 C)    Negative: [1] Fever > 101 F (38.3 C) AND [2] age > 60 years    Negative: [1] Fever > 100.0 F (37.8 C) AND [2] bedridden (e.g., nursing home patient, CVA, chronic illness, recovering from surgery)    Negative: HIGH RISK for severe COVID complications (e.g., weak immune system, age > 64 years, obesity with BMI > 25, pregnant, chronic lung disease or other chronic medical condition)  (Exception: Already seen by PCP and no new or worsening symptoms.)    Negative: [1] HIGH RISK patient AND [2] influenza is widespread in the community AND [3] ONE OR MORE respiratory symptoms: cough, sore throat, runny or stuffy nose    Negative: [1] HIGH RISK patient AND [2] influenza exposure within the last 7 days AND [3] ONE OR MORE respiratory symptoms: cough, sore throat, runny or stuffy nose    Negative: Oxygen level (e.g., pulse oximetry) 91 to 94 percent    Negative: Fever present > 3 days (72 hours)    Negative: [1] Fever returns after gone for over 24 hours AND [2] symptoms worse or not improved    Negative: [1] Continuous (nonstop) coughing interferes with work or school AND [2] no improvement using cough treatment per Care Advice    Negative: [1] COVID-19 infection suspected by caller or triager AND [2] mild symptoms (cough, fever, or others) AND [3] negative COVID-19 rapid test    Negative: Cough present > 3 weeks    Negative: [1]  COVID-19 diagnosed by positive lab test (e.g., PCR, rapid self-test kit) AND [2] NO symptoms (e.g., cough, fever, others)    Negative: [1] COVID-19 diagnosed by positive lab test (e.g., PCR, rapid self-test kit) AND [2] mild symptoms (e.g., cough, fever, others) AND [3] no complications or SOB    Negative: [1] COVID-19 diagnosed by doctor (or NP/PA) AND [2] mild symptoms (e.g., cough, fever, others) AND [3] no complications or SOB    Negative: [1] COVID-19 diagnosed AND [2] has mild nausea, vomiting or diarrhea    Negative: [1] COVID-19 infection suspected by caller or triager AND [2] mild symptoms (cough, fever, or others) AND [3] has not gotten tested yet    Negative: COVID-19 Home Isolation, questions about    Protocols used: CORONAVIRUS (COVID-19) DIAGNOSED OR [id number] 1.18.2022

## 2022-04-17 DIAGNOSIS — G25.0 BENIGN ESSENTIAL TREMOR: ICD-10-CM

## 2022-04-17 NOTE — LETTER
4/17/2022        RE: Wilfrid Ortez  [address]  [place] [address]          Dear Mr. Ortez,       We recently provided you with medication refills.  Many medications require routine follow-up with your doctor.    Your prescription(s) have been refilled for 30 days so you may have time for the above noted follow-up. Please call to schedule soon so we can assure you have an appointment before your next refills are needed. If you have already made a follow up appointment, please disregard this letter.         Sincerely,      [facility]     (Formerly known as [facility])

## 2022-04-18 ENCOUNTER — TELEPHONE (OUTPATIENT)
Dept: NEUROLOGY | Facility: CLINIC | Age: 82
End: 2022-04-18
Payer: COMMERCIAL

## 2022-04-18 DIAGNOSIS — G25.0 BENIGN ESSENTIAL TREMOR: ICD-10-CM

## 2022-04-18 RX ORDER — PROPRANOLOL HYDROCHLORIDE 10 MG/1
10 TABLET ORAL 2 TIMES DAILY
Qty: 60 TABLET | Refills: 0 | OUTPATIENT
Start: 2022-04-18

## 2022-04-18 RX ORDER — PROPRANOLOL HYDROCHLORIDE 10 MG/1
10 TABLET ORAL 2 TIMES DAILY
Qty: 60 TABLET | Refills: 0 | Status: SHIPPED | OUTPATIENT
Start: 2022-04-18 | End: 2022-05-17

## 2022-04-18 NOTE — TELEPHONE ENCOUNTER
Pharmacy requesting 90 day supply vs. 30 day of propranolol. Will deny as pt needs an appt.     Cathy Conrad RN on 4/18/2022 at 1:57 PM

## 2022-04-18 NOTE — TELEPHONE ENCOUNTER
Refill request for Propranolol. Pt last seen 8/23/21 and is currently due for follow up. Will send in 30 day supply with zero refills.     Cathy Conrad RN on 4/18/2022 at 11:38 AM

## 2022-04-29 ENCOUNTER — TELEPHONE (OUTPATIENT)
Dept: INTERNAL MEDICINE | Facility: CLINIC | Age: 82
End: 2022-04-29
Payer: COMMERCIAL

## 2022-04-29 DIAGNOSIS — R05.9 COUGH: Primary | ICD-10-CM

## 2022-04-29 NOTE — TELEPHONE ENCOUNTER
Patient is calling to state the still have their cough from COVID and looking for a medication request of cough syrup.    Pretty dry cough    Patient has been taking (mucinex) hasn't worked the last 4 days.    Please see note from 4/11/2022    [facility]  [address] at [facility] [address]    Call Back   [phone]

## 2022-04-29 NOTE — TELEPHONE ENCOUNTER
Patient is calling to state the still have their cough from COVID and looking for a medication request of cough syrup.     Pretty dry cough     Patient has been taking (mucinex) hasn't worked the last 4 days.     Please see note from 4/11/2022     [facility]  [address] at [facility] [address]     Call Back   [phone]

## 2022-04-29 NOTE — TELEPHONE ENCOUNTER
Patient is calling to state the still have their cough from COVID and looking for a medication request of cough syrup.     Pretty dry cough     Patient has been taking (mucinex) hasn't worked the last 4 days.     Please see note from 4/11/2022     [facility]  [address] at [facility] [address]     Call Back   [phone]

## 2022-04-30 NOTE — TELEPHONE ENCOUNTER
Please call for me patient and pharmacy okay for Cheratussin- with codeine cough syrup 240 cc 1 to 2 teaspoons 4 times a day as needed for cough with 1 refill.  Please call patient and pharmacy.

## 2022-05-03 ENCOUNTER — OFFICE VISIT (OUTPATIENT)
Dept: INTERNAL MEDICINE | Facility: CLINIC | Age: 82
End: 2022-05-03
Payer: COMMERCIAL

## 2022-05-03 VITALS
HEART RATE: 64 BPM | WEIGHT: 157 LBS | DIASTOLIC BLOOD PRESSURE: 70 MMHG | BODY MASS INDEX: 26.13 KG/M2 | SYSTOLIC BLOOD PRESSURE: 146 MMHG | RESPIRATION RATE: 18 BRPM | OXYGEN SATURATION: 96 %

## 2022-05-03 DIAGNOSIS — Z00.00 ROUTINE GENERAL MEDICAL EXAMINATION AT A HEALTH CARE FACILITY: ICD-10-CM

## 2022-05-03 DIAGNOSIS — N18.31 CHRONIC KIDNEY DISEASE, STAGE 3A (H): ICD-10-CM

## 2022-05-03 DIAGNOSIS — E11.8 TYPE 2 DIABETES MELLITUS WITH COMPLICATION, WITHOUT LONG-TERM CURRENT USE OF INSULIN (H): Primary | ICD-10-CM

## 2022-05-03 LAB
CHOLEST SERPL-MCNC: 148 MG/DL
FASTING STATUS PATIENT QL REPORTED: YES
FASTING STATUS PATIENT QL REPORTED: YES
GLUCOSE BLD-MCNC: 145 MG/DL (ref 70–125)
HBA1C MFR BLD: 6.9 % (ref 0–5.6)
HDLC SERPL-MCNC: 48 MG/DL
LDLC SERPL CALC-MCNC: 80 MG/DL
TRIGL SERPL-MCNC: 99 MG/DL

## 2022-05-03 PROCEDURE — 99214 OFFICE O/P EST MOD 30 MIN: CPT | Performed by: INTERNAL MEDICINE

## 2022-05-03 PROCEDURE — 82947 ASSAY GLUCOSE BLOOD QUANT: CPT | Performed by: INTERNAL MEDICINE

## 2022-05-03 PROCEDURE — 36415 COLL VENOUS BLD VENIPUNCTURE: CPT | Performed by: INTERNAL MEDICINE

## 2022-05-03 PROCEDURE — 80061 LIPID PANEL: CPT | Performed by: INTERNAL MEDICINE

## 2022-05-03 PROCEDURE — 83036 HEMOGLOBIN GLYCOSYLATED A1C: CPT | Performed by: INTERNAL MEDICINE

## 2022-05-03 RX ORDER — PREDNISONE 20 MG/1
20 TABLET ORAL 2 TIMES DAILY
Qty: 10 TABLET | Refills: 0 | Status: SHIPPED | OUTPATIENT
Start: 2022-05-03 | End: 2022-08-30

## 2022-05-03 RX ORDER — AZITHROMYCIN 250 MG/1
TABLET, FILM COATED ORAL
Qty: 6 TABLET | Refills: 0 | Status: SHIPPED | OUTPATIENT
Start: 2022-05-03 | End: 2022-05-08

## 2022-05-03 NOTE — TELEPHONE ENCOUNTER
Try Robitussin with codeine cough syrup 240 cc 1 to 2 teaspoons 4 times a day as needed for cough 1 refill.  My NICHELLE number is HERMELINDO [phone] please call patient and pharmacy.

## 2022-05-03 NOTE — LETTER
May 3, 2022      Murali BOWMAN [facility]  [address]   [place] [address]        Dear ,    We are writing to inform you of your test results.    Your test results fall within the expected range(s) or remain unchanged from previous results.  Please continue with current treatment plan.    Resulted Orders   Hemoglobin A1c   Result Value Ref Range    Hemoglobin A1C 6.9 (H) 0.0 - 5.6 %      Comment:      Normal <5.7%   Prediabetes 5.7-6.4%    Diabetes 6.5% or higher     Note: Adopted from ADA consensus guidelines.   Lipid panel reflex to direct LDL Fasting   Result Value Ref Range    Cholesterol 148 <=199 mg/dL    Triglycerides 99 <=149 mg/dL    Direct Measure HDL 48 >=40 mg/dL      Comment:      HDL Cholesterol Reference Range:     0-2 years:   No reference ranges established for patients under 2 years old  at [facility] for lipid analytes.    2-8 years:  Greater than 45 mg/dL     18 years and older:   Female: Greater than or equal to 50 mg/dL   Male:   Greater than or equal to 40 mg/dL    LDL Cholesterol Calculated 80 <=129 mg/dL    Patient Fasting > 8hrs? Yes    Glucose   Result Value Ref Range    Glucose 145 (H) 70 - 125 mg/dL    Patient Fasting > 8hrs? Yes        If you have any questions or concerns, please call the clinic at the number listed above.       Sincerely,      Mayito Carlos MD

## 2022-05-03 NOTE — LETTER
May 3, 2022      Murali Ortez  [address]   [place] [address]        Dear RosaNeelima,    We are writing to inform you of your test results.    Very good     Resulted Orders   Hemoglobin A1c   Result Value Ref Range    Hemoglobin A1C 6.9 (H) 0.0 - 5.6 %      Comment:      Normal <5.7%   Prediabetes 5.7-6.4%    Diabetes 6.5% or higher     Note: Adopted from ADA consensus guidelines.       If you have any questions or concerns, please call the clinic at the number listed above.       Sincerely,      Mayito Carlos MD

## 2022-05-03 NOTE — PROGRESS NOTES
Assessment/Plan:    Diabetes mellitus type 2 check A1c blood sugar lipid panel today.    Asthmatic bronchitis mild try prednisone 20 mg twice daily for 5 days with concurrent Z-Evaristo as directed.  X-ray chest x-ray not available.  Discussed with patient and he wished to defer x-ray at this time of his chest.    Hypertension controlled stable.    15 minutes spent on the date of the encounter doing chart review and patient visit     Subjective:  Wilfrid Ortez is a 81 year old male presents for the following health issues COVID illness March 2022.  Resolving has persistent cough with wheezing.  See above    ROS:  No blood in stool urine or sputum med list reviewed reconciled in the chart.    Objective:  BP (!) 146/70   Pulse 64   Resp 18   Wt 71.2 kg (157 lb)   SpO2 96%   BMI 26.13 kg/m    Few scattered sibilant rhonchi posteriorly left chest no rales not in acute distress not toxic heart tones regular rhythm no carotid bruits belly soft nontender extremities free of edema slight pallor noted overall.  No central acrocyanosis no tachypnea.    Mayito Carlos MD  Internal Medicine    Answers for HPI/ROS submitted by the patient on 5/3/2022  Frequency of checking blood sugars:: a few times a month  What time of day are you checking your blood sugars : before meals  Have you had any blood sugars above 200?: No  Have you had any blood sugars below 70?: No  Hypoglycemia symptoms:: shakiness, weakness  Diabetic concerns:: none  Paraesthesia present:: weight loss  How many servings of fruits and vegetables do you eat daily?: 0-1  On average, how many sweetened beverages do you drink each day (Examples: soda, juice, sweet tea, etc.  Do NOT count diet or artificially sweetened beverages)?: 0  How many minutes a day do you exercise enough to make your heart beat faster?: 9 or less  How many days a week do you exercise enough to make your heart beat faster?: 3 or less  How many days per week do you miss taking your  medication?: 0  What is the reason for your visit today?: 4 month follow up and ac1 blood work  How would you describe these symptoms?: Moderate  Are your symptoms:: Worsening  Have you had these symptoms before?: Yes  Have you tried or received treatment for these symptoms before?: No

## 2022-05-17 ENCOUNTER — TELEPHONE (OUTPATIENT)
Dept: NEUROLOGY | Facility: CLINIC | Age: 82
End: 2022-05-17
Payer: COMMERCIAL

## 2022-05-17 DIAGNOSIS — G25.0 BENIGN ESSENTIAL TREMOR: ICD-10-CM

## 2022-05-17 RX ORDER — PROPRANOLOL HYDROCHLORIDE 10 MG/1
10 TABLET ORAL 2 TIMES DAILY
Qty: 180 TABLET | Refills: 1 | Status: SHIPPED | OUTPATIENT
Start: 2022-05-17 | End: 2022-08-30

## 2022-05-17 NOTE — TELEPHONE ENCOUNTER
Health Call Center    Phone Message    May a detailed message be left on voicemail: yes     Reason for Call: Medication Refill Request    Has the patient contacted the pharmacy for the refill? Yes   Name of medication being requested: propranolol (INDERAL) 10 MG   Provider who prescribed the medication: Jairo Tamayo  Pharmacy: [id number] - [place], [place] - [address] AT [facility] [address]  Date medication is needed: asap   Patient is schedule for f/u on 12/13/2022 and is needing a refill of this medication. Patient has 8 tablets left and would like a 3 month refill.     Please call patient back at # [phone]      Action Taken: Message routed to:  Other: [facility]     Travel Screening: Not Applicable

## 2022-06-02 ENCOUNTER — NURSE TRIAGE (OUTPATIENT)
Dept: NURSING | Facility: CLINIC | Age: 82
End: 2022-06-02
Payer: COMMERCIAL

## 2022-06-02 NOTE — TELEPHONE ENCOUNTER
"Patient is calling and states that he has a headache and woke up with one this morning.  Cough is also present.  Patient feels like his head \"is in a fog\".  Yesterday his two fingers went numb.  Patient denies injury to head.  Patient states that he is going to phone 911.    COVID 19 Nurse Triage Plan/Patient Instructions    Please be aware that novel coronavirus (COVID-19) may be circulating in the community. If you develop symptoms such as fever, cough, or SOB or if you have concerns about the presence of another infection including coronavirus (COVID-19), please contact your health care provider or visit [email or web address].     Disposition/Instructions    ED Visit recommended. Follow protocol based instructions.     Bring Your Own Device:  Please also bring your smart device(s) (smart phones, tablets, laptops) and their charging cables for your personal use and to communicate with your care team during your visit.    Thank you for taking steps to prevent the spread of this virus.  o Limit your contact with others.  o Wear a simple mask to cover your cough.  o Wash your hands well and often.    Resources    [facility]: About COVID-19: [email or web address]    CDC: What to Do If You're Sick: www.cdc.gov/coronavirus/2019-ncov/about/steps-when-sick.html    CDC: Ending Home Isolation: www.cdc.gov/coronavirus/2019-ncov/hcp/disposition-in-home-patients.html     CDC: Caring for Someone: www.cdc.gov/coronavirus/2019-ncov/if-you-are-sick/care-for-someone.html     [facility]: Interim Guidance for Hospital Discharge to Home: www.health.[facility].[place]/diseases/coronavirus/hcp/hospdischarge.pdf    [facility] clinical trials (COVID-19 research studies): clinicalaffairs.[facility]/umn-clinical-trials     Below are the COVID-19 hotlines at the [place] Department of Health ([facility]). Interpreters are available.   o For health questions: Call 668-818-7694 or 1-165.705.5346 (7 a.m. to 7 p.m.)  o For " questions about schools and childcare: Call 507-050-7411 or 1-434.547.8170 (7 a.m. to 7 p.m.)                       Reason for Disposition    Stiff neck (can't touch chin to chest)    Additional Information    Negative: Difficult to awaken or acting confused (e.g., disoriented, slurred speech)    Negative: Weakness of the face, arm or leg on one side of the body and new onset    Negative: Numbness of the face, arm or leg on one side of the body and new onset    Negative: Loss of speech or garbled speech and new onset    Negative: Passed out (i.e., fainted, collapsed and was not responding)    Negative: Sounds like a life-threatening emergency to the triager    Negative: Unable to walk without falling    Protocols used: HEADACHE-A-OH

## 2022-07-07 ENCOUNTER — NURSE TRIAGE (OUTPATIENT)
Dept: NURSING | Facility: CLINIC | Age: 82
End: 2022-07-07

## 2022-07-07 NOTE — TELEPHONE ENCOUNTER
"Nurse Triage SBAR    Is this a 2nd Level Triage? NO    Situation: Patient calling with Medication question about taking oxycodone. .  Consent: not needed    Background: Pt calling and states he was at [facility] on Tuesday with a \"pain in his stomach on his left side\".  After being there they determined he had a kidney stone in left ureter and was sent home with Oxycodone and tylenol.  Pt states he has taken 5 of the 5 mg tablets so far and only has 3 left.  Pt states his pain is much less than it was upon discharge - Pt is wondering if he can just take tylenol or if he should finish those 3 tablets.  Writer advised that if he can manage his pain with just the tylenol, then that is his best option.      BP was 200/80 while at the hospital.  Pt asks if he could die from high  Blood pressure. Writer advised that people do pass from unmanaged high blood pressure and urged pt to make an appointment to be proactive about his blood pressure management.  Pt states he is unsure if his blood pressure is high at this time and states he is asymptomatic.   Pt takes propranolol and states he does not take his blood pressure at home.  Denies vision changes, headaches, feeling jittery, Able to walk normally.  Offered to transfer to scheduling to make an appt to be seen.   Pt declined and stated he would be going somewhere today where he could check his blood pressure and would call back if needed.        Protocol Recommended Disposition:   Home Care    Recommendation: Advised patient to do home care. Home care reviewed.  . Reviewed concerning symptoms and when to call back.     Francisca Falcon RN [facility] 7/7/2022 12:31 PM    COVID 19 Nurse Triage Plan/Patient Instructions    Please be aware that novel coronavirus (COVID-19) may be circulating in the community. If you develop symptoms such as fever, cough, or SOB or if you have concerns about the presence of another infection including coronavirus (COVID-19), " please contact your health care provider or visit [email or web address].     Disposition/Instructions    Home care recommended. Follow home care protocol based instructions.    Thank you for taking steps to prevent the spread of this virus.  o Limit your contact with others.  o Wear a simple mask to cover your cough.  o Wash your hands well and often.    Resources    [facility]: About COVID-19: [email or web address]    CDC: What to Do If You're Sick: www.cdc.gov/coronavirus/2019-ncov/about/steps-when-sick.html    CDC: Ending Home Isolation: www.cdc.gov/coronavirus/2019-ncov/hcp/disposition-in-home-patients.html     CDC: Caring for Someone: www.cdc.gov/coronavirus/2019-ncov/if-you-are-sick/care-for-someone.html     [facility]: Interim Guidance for Hospital Discharge to Home: www.[facility].mn.us/diseases/coronavirus/hcp/hospdischarge.pdf    [facility] clinical trials (COVID-19 research studies): clinicalaffairs.[facility]-clinical-trials     Below are the COVID-19 hotlines at the [place] Department of Health ([facility]). Interpreters are available.   o For health questions: Call 635-472-0950 or 1-440.208.2571 (7 a.m. to 7 p.m.)  o For questions about schools and childcare: Call 861-010-0622 or 1-559.596.9970 (7 a.m. to 7 p.m.)                         Additional Information    Information only question and nurse able to answer    Protocols used: INFORMATION ONLY CALL - NO TRIAGE-A-OH

## 2022-07-08 ENCOUNTER — NURSE TRIAGE (OUTPATIENT)
Dept: NURSING | Facility: CLINIC | Age: 82
End: 2022-07-08

## 2022-07-08 DIAGNOSIS — N20.0 KIDNEY STONE: Primary | ICD-10-CM

## 2022-07-08 RX ORDER — OXYCODONE HYDROCHLORIDE 5 MG/1
5 TABLET ORAL
COMMUNITY
Start: 2022-07-05 | End: 2022-07-08

## 2022-07-08 RX ORDER — OXYCODONE HYDROCHLORIDE 5 MG/1
5 TABLET ORAL EVERY 6 HOURS PRN
Qty: 12 TABLET | Refills: 0 | Status: SHIPPED | OUTPATIENT
Start: 2022-07-08 | End: 2022-08-30

## 2022-07-08 NOTE — TELEPHONE ENCOUNTER
"Pt calling with L side abdominal pain.    Pt states he started having increased pain on his LLQ radiating to his back this morning. He was recently (7/5) seen at [facility] [place] [facility] ER and Dx with a kidney stone. Is taking 5mg oxycodone q 6 hours as prescribed and just took 1 pill at 0630, about 10 min ago.    Says he feels bloated and like there is a lot of pressure in his stomach, but that his abdomen does not look distended. He notes that his last BM was Monday night. He took a stool softener on Tuesday, but nothing more since then.  He is able to urinate, but does not feel like he's emptying his bladder all the way. No blood in his urine. Afebrile. Says his appetite is decreased, but denies N/V.    Since pt had just taken his pain meds, agreed that RN would call back in 20 min after giving the meds some time to to effect. Upon calling the pt back at 0710, he states that his pain is now \"mild\" and he is feeling good relief.     Discussed warning signs and when he should call back. Also discussed constipation care while on narcotics. Recommend pt buy some Miralax and take it as directed, in addition to the daily stool softener he already has at home. Should take every day until his BMs are a normal consistency and as long as he continues taking the oxycodone.     Pt states that he only has 1 5mg tab of oxycodone remaining in his prescription. Will route message to PCP/care team for refill. Preferred pharmacy: [id number] - [place], [place] - [address] AT [facility] MONISHA Ontiveros, RN, BSN  [facility]   Triage Nurse Advisor      Reason for Disposition    [1] MILD-MODERATE pain AND [2] constant and [3] present < 2 hours    Additional Information    Negative: Shock suspected (e.g., cold/pale/clammy skin, too weak to stand, low BP, rapid pulse)    Negative: Difficult to awaken or acting confused (e.g., disoriented, slurred speech)    Negative: Passed out (i.e., lost consciousness, collapsed and " "was not responding)    Negative: Sounds like a life-threatening emergency to the triager    Negative: Chest pain    Negative: Pain is mainly in upper abdomen  (if needed ask: \"is it mainly above the belly button?\")    Negative: Followed an abdomen (stomach) injury    Negative: [1] SEVERE pain (e.g., excruciating) AND [2] present > 1 hour    Negative: [1] SEVERE pain AND [2] age > 60    Negative: [1] Vomiting AND [2] contains red blood or black (\"coffee ground\") material  (Exception: few red streaks in vomit that only happened once)    Negative: Blood in bowel movements  (Exception: Blood on surface of BM with constipation)    Negative: Black or tarry bowel movements  (Exception: chronic-unchanged  black-grey bowel movements AND is taking iron pills or Pepto-bismol)    Negative: [1] Unable to urinate (or only a few drops) > 4 hours AND [2] bladder feels very full (e.g., palpable bladder or strong urge to urinate)    Negative: [1] Pain in the scrotum or testicle AND [2] present > 1 hour    Negative: Patient sounds very sick or weak to the triager    Negative: [1] MILD-MODERATE pain AND [2] constant AND [3] present > 2 hours    Negative: [1] Vomiting AND [2] abdomen looks much more swollen than usual    Negative: [1] Vomiting AND [2] contains bile (green color)    Negative: White of the eyes have turned yellow (i.e., jaundice)    Negative: Fever > 103 F (39.4 C)    Negative: [1] Fever > 101 F (38.3 C) AND [2] age > 60    Negative: [1] Fever > 100.0 F (37.8 C) AND [2] bedridden (e.g., nursing home patient, CVA, chronic illness, recovering from surgery)    Negative: [1] Fever > 100.0 F (37.8 C) AND [2] diabetes mellitus or weak immune system (e.g., HIV positive, cancer chemo, splenectomy, organ transplant, chronic steroids)    Negative: [1] SEVERE pain AND [2] present < 1 hour    Negative: [1] MODERATE pain (e.g., interferes with normal activities) AND [2] pain comes and goes (cramps) AND [3] present > 24 hours  " (Exception: pain with Vomiting or Diarrhea - see that Guideline)    Negative: [1] MILD pain (e.g., does not interfere with normal activities) AND [2] pain comes and goes (cramps) [3] present > 48 hours    Negative: Age > 60 years    Negative: Blood in urine (red, pink, or tea-colored)    Negative: Abdominal pain is a chronic symptom (recurrent or ongoing AND present > 4 weeks)    Protocols used: ABDOMINAL PAIN - MALE-A-AH

## 2022-07-08 NOTE — TELEPHONE ENCOUNTER
Called and spoke with patient.     Message below from provider relayed.     Patient declined referral to kidney stone specialist for now.  Patient states he will wait and discuss with PCP on 7/11.

## 2022-07-08 NOTE — TELEPHONE ENCOUNTER
We sent a a #12 pill refill of the oxycodone.    Please schedule the patient for a virtual visit to follow up on this with Dr. Mayito Carlos on Monday.    Please see if he would like a referral to our kidney stone institute at [facility] to follow up on the kidney stones and see if any other intervention is needed.    He should go back to the emergency room (ER) over the weekend if he is not doing well.    Desean Reardon MD  General Internal Medicine  [facility]  7/8/2022, 9:48 AM

## 2022-07-08 NOTE — TELEPHONE ENCOUNTER
Pending Prescriptions:                       Disp   Refills    oxyCODONE (ROXICODONE) 5 MG tablet                            Sig: Take 1 tablet (5 mg) by mouth every 5 hours    Signed Prescriptions:                        Disp   Refills    oxyCODONE (ROXICODONE) 5 MG tablet                         Sig: Take 5 mg by mouth every 5 hours  Authorizing Provider: PATIENT REPORTED  Ordering User: SYLVESTER MILLIGAN    Please see below message. Ok to refill?     Sylvester Milligan CMA on 7/8/2022 at 8:32 AM

## 2022-07-11 ENCOUNTER — VIRTUAL VISIT (OUTPATIENT)
Dept: INTERNAL MEDICINE | Facility: CLINIC | Age: 82
End: 2022-07-11
Payer: COMMERCIAL

## 2022-07-11 DIAGNOSIS — N20.0 NEPHROLITHIASIS: Primary | ICD-10-CM

## 2022-07-11 PROCEDURE — 99213 OFFICE O/P EST LOW 20 MIN: CPT | Mod: TEL | Performed by: INTERNAL MEDICINE

## 2022-07-11 NOTE — PROGRESS NOTES
Wilfrid Ortez is a 81 year oldwho is being evaluated via a billable telephone visit.       What phone number would you like to be contacted at? [phone]      Assessment & Plan  Nephrolithiasis left side [facility] CT scan positive for stone small.  Push fluids rest arthritis strength Tylenol 2 tablets 3 times a day as needed plus kidney stone [place] referral Dr. Hayden Cortes presiding.    Hypertension labile.  Needs recheck blood pressure elevated at time of kidney stone diagnosis.  [place].  On Inderal and history of type 2 diabetes.  Needs close control recheck patient in 1 month.   No restriction regular exercise advised.    11 minutes spent on the date of the encounter doing chart review, patient visit and documentation and I spoke with the patient 5 minutes on the phone directly.        Subjective   History as above.     Review of Systems   No blood in stool urine or sputum medication list reviewed reconciled.       Mayito Carlos MD

## 2022-07-12 ENCOUNTER — TELEPHONE (OUTPATIENT)
Dept: UROLOGY | Facility: CLINIC | Age: 82
End: 2022-07-12

## 2022-07-12 NOTE — TELEPHONE ENCOUNTER
Message left for patient to call clinic to set up an appointment for kidney stone follow-up.  Danielle Nunez RN

## 2022-07-13 ENCOUNTER — TELEPHONE (OUTPATIENT)
Dept: UROLOGY | Facility: CLINIC | Age: 82
End: 2022-07-13

## 2022-07-18 ENCOUNTER — VIRTUAL VISIT (OUTPATIENT)
Dept: UROLOGY | Facility: CLINIC | Age: 82
End: 2022-07-18
Payer: COMMERCIAL

## 2022-07-18 DIAGNOSIS — N20.0 NEPHROLITHIASIS: ICD-10-CM

## 2022-07-18 DIAGNOSIS — N20.0 CALCULUS OF KIDNEY: ICD-10-CM

## 2022-07-18 DIAGNOSIS — N20.1 CALCULUS OF URETER: Primary | ICD-10-CM

## 2022-07-18 PROCEDURE — 99204 OFFICE O/P NEW MOD 45 MIN: CPT | Mod: GT | Performed by: UROLOGY

## 2022-07-18 ASSESSMENT — PAIN SCALES - GENERAL: PAINLEVEL: NO PAIN (0)

## 2022-07-18 NOTE — PROGRESS NOTES
Assessment/Plan:    Assessment & Plan   Wilfrid was seen today for new patient.    Diagnoses and all orders for this visit:    Calculus of ureter  -     Patient Stated Goal: Pass my stone  -     CT Abdomen Pelvis w/o Contrast; Future    Nephrolithiasis  -     Adult Urology Referral    Calculus of kidney  -     CT Abdomen Pelvis w/o Contrast; Future        Stone Management Plan  Stone Management 7/18/2022   Urinary Tract Infection No suspicion of infection   Renal Colic Asymptomatic at this time   Renal Failure No suspicion of renal failure   Current CT date 7/5/2022   Right sided stones? No   R Stone Event No current event   Left sided stones? Yes   L Number of ureteral stones 1   L GSD of ureteral stones 3   L Location of ureteral stone Proximal   L Number of kidney stones  1   L GSD of kidney stones 2 - 4   L Hydronephrosis Mild   L Stone Event New event   Diagnosis date 7/5/2022   Initial location of primary symptomatic stone Proximal   Initial GSD of primary symptomatic stone 3   L MET Status Initiation   L Current Plan MET           PLAN    Will proceed with medical expulsive therapy. Risks and benefits were detailed of medical expulsive therapy including probability of stone passage, recurrent renal colic, and requirement of emergency medical and/or surgical care and further imaging. Patient verbalized understanding. Patient agrees with plan as discussed. He will return in 1 weeks with low dose CT scan.    Phone call duration: 10 minutes  15 minutes spent on the date of the encounter doing chart review, history and exam, documentation and further activities per the note    RAMAN NY MD  [facility]    Subjective:     HPI  Mr. Wilfrid Ortez is a 81 year old  male who is being evaluated via a billable telephone visit by [facility] ureteral stone referred by Dr Carlos.    He is a first time unidentified composition stone  former who has not required stone clearance procedures. He has not previously participated in stone risk evaluation. He has no identified modifiable stone risk factors. He has identified non-modifiable stone risks including:  multiple stones at presentation.    He resented to [facility] with severe, sudden onset left flak pain. Good control since. No fever or chills. No changes in voiding pattern.     CT scan is personally reviewed and demonstrates a 3 mm left proximal ureteral stone and a 3 mm left renal stone..    Will confirm suspected passage with Ct in one week.    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      Sleep apnea     CPAP, hasn't used in a year       Past Surgical History:   Procedure Laterality Date     TRANSRECTAL ULTRASONIC, TRANSURETHRAL RESECTION (TUR) OF PROSTATE CYST       ZZC TOTAL HIP ARTHROPLASTY Right 5/4/2021    Procedure: RIGHT TOTAL HIP ARTHROPLASTY;  Surgeon: Frandy Clinton MD;  Location: [facility];  Service: Orthopedics       Current Outpatient Medications   Medication Sig Dispense Refill     albuterol (PROAIR HFA/PROVENTIL HFA/VENTOLIN HFA) 108 (90 Base) MCG/ACT inhaler Inhale 2 puffs into the lungs every 4 hours as needed for shortness of breath / dyspnea or wheezing 18 g 1     aspirin (ASA) 325 MG EC tablet Take 325 mg by mouth daily       codeine-guaiFENesin 200-10 MG/5ML LIQD 1 to 2 teaspoons 4 times per day as needed for cough 473 mL 0     gabapentin (NEURONTIN) 300 MG capsule 300mg TID 90 capsule 1     latanoprost (XALATAN) 0.005 % ophthalmic solution 1 drop        LORazepam (ATIVAN) 0.5 MG tablet Take 0.5 mg by mouth every 6 hours as needed for anxiety Patient takes 0.5 mg tab at nigjht       meclizine (ANTIVERT) 25 MG tablet Take 1 tablet (25 mg) by mouth 3 times daily as needed for dizziness 90 tablet 11     omeprazole (PRILOSEC) 20 MG DR capsule Take 1 capsule (20 mg) by mouth daily 90 capsule 2     omeprazole (PRILOSEC) 20 MG DR capsule TAKE 1 CAPSULE(20 MG) BY MOUTH DAILY (Patient not taking: No sig reported) 90 capsule 2     oxyCODONE (ROXICODONE) 5 MG tablet Take 1 tablet (5 mg) by mouth every 6 hours as needed for severe pain 12 tablet 0     PARoxetine (PAXIL) 10 MG tablet Take 10 mg by mouth At Bedtime        PARoxetine (PAXIL) 40 MG tablet Take 40 mg by mouth every morning        predniSONE (DELTASONE) 20 MG tablet Take 1 tablet (20 mg) by mouth 2 times daily (Patient not taking: Reported on 7/11/2022) 10 tablet 0     propranolol (INDERAL) 10 MG tablet Take 1 tablet (10 mg) by mouth 2 times daily 180 tablet 1     simvastatin (ZOCOR) 20 MG tablet Take 20 mg by mouth At Bedtime         Allergies   Allergen Reactions     Ibuprofen      nausea       Social History     Socioeconomic History     Marital status: Single     Spouse name: Not on file     Number of children: Not on file     Years of education: Not on file     Highest education level: Not on file   Occupational History     Not on file   Tobacco Use     Smoking status: Never Smoker     Smokeless tobacco: Never Used   Substance and Sexual Activity     Alcohol use: Not Currently     Alcohol/week: 1.0 standard drink     Comment: Alcoholic Drinks/day: rare     Drug use: No     Sexual activity: Not on file   Other Topics Concern     Parent/sibling w/ CABG, MI or angioplasty before 65F 55M? Not Asked   Social History Narrative     Not on file     Social Determinants of Health     Financial Resource Strain: Not on file   Food Insecurity: Not on file   Transportation Needs: Not on file   Physical Activity: Not on  file   Stress: Not on file   Social Connections: Not on file   Intimate Partner Violence: Not on file   Housing Stability: Not on file       Family History   Problem Relation Age of Onset     Diabetes Brother      Throat cancer Mother      Myocardial Infarction Father      Alcoholism Brother      Cancer Mother         throat     Diabetes Mother      Heart Disease Father        Objective:     No vitals or physical exam obtained due to virtual visit    Labs  Most Recent 3 CBC's:Recent Labs   Lab Test 10/28/21  0848 05/06/21  0603 05/05/21  0611 04/20/21  0927 04/01/21  1946   WBC 9.2  --   --  7.6 6.5   HGB 13.5 9.3* 11.3* 14.6 14.5   MCV 87  --   --  88 89   *  --   --  158 158     Most Recent 3 BMP's:Recent Labs   Lab Test 05/03/22  1144 12/27/21  1006 10/28/21  0848 05/04/21  0913 04/20/21  0927 04/01/21  1946   NA  --   --  138  --  137 140   POTASSIUM  --   --  4.0  --  4.6 4.3   CHLORIDE  --   --  100  --  98 102   CO2  --   --  29  --  30 31   BUN  --   --  13  --  19 18   CR  --   --  1.21  --  1.23 1.20   ANIONGAP  --   --  9  --  9 7   BRYANNA  --   --  9.5  --  9.4 9.1   * 119 149*   < > 135* 107    < > = values in this interval not displayed.     Most Recent Urinalysis:Recent Labs   Lab Test 04/20/21  0938   COLOR Yellow   APPEARANCE Clear   URINEGLC 100 mg/dL*   URINEBILI Negative   URINEKETONE Negative   SG 1.020   UBLD Negative   URINEPH 6.0   PROTEIN Negative   UROBILINOGEN 2.0 E.U./dL*   NITRITE Negative   LEUKEST Negative     Acute Labs Urine Culture  No results found for: CULTURE      none

## 2022-07-18 NOTE — PATIENT INSTRUCTIONS
Patient Stated Goal: Pass my stone  Symptom Control While Passing A Stone    The goal of [facility] is to let a smaller kidney stone (less than 4 to 5 mm) pass without intervention if possible. Giving your body a chance to clear the stone may take a few hours up to a few weeks.  Keeping you well-informed, safe and fairly comfortable is important.    Drink to thirst  Do not attempt to  flush out  your stone by drinking too much fluid. This does not work and may increase nausea. Drink enough to satisfy your body s thirst. Eating your normal diet is fine.   Medications (that may be suggested or prescribed)    Ibuprofen (Advil or Motrin) Available over the counter  o Take two (200mg) tablets every six hours until the stone passes.  o Prevents spasm of the ureter.    o Decreases pain.      Dramamine* (drowsy version, non-generic formulation) Available over the counter  o Take 50mg at bedtime  o Decreases spasms of the ureter  o Decreases nausea  o Decreases acute pain  o Decreases recurrence of pain for 24 hours  o Will help you sleep  *This medication will cause increase drowsiness, do not drive or operate machinery for 6 hours.      Narcotics (Percocet, Vicodin, Dilaudid) Take as prescribed for severe pain unrelieved by ibuprofen and Dramamine  o Narcotics have significant side effects and only  cover-up  pain. They have no effect on the cause of pain.  o Common side effects  - Confusion, disorientation and sedation - DO NOT DRIVE OR OPERATE MACHINERY WITHIN 24 HOURS  - Nausea - take Dramamine or Zofran or Haldol to help control  - Constipation  - Sleep disturbances      Ondansetron (Zofran) Take as prescribed  o Reserve for severe nausea  o May cause constipation, start over the counter Miralax if needed      Second Line Anti-Nausea Medication: Adding a different anti-nausea medication maybe helpful for persistent nausea.  The combined effect of different types of anti-nausea medications maybe more  effective than either medication by itself, even in higher doses.  o Compazine: Take as prescribed      Information about kidney stones    Crystals can form if chemicals are too concentrated in your urine. If the crystal grows over time, a stone may form. A stone usually isn t painful while it is still in the kidney.    As the stone begins to leave the kidney, you may experience episodes of flank pain as the kidney stone approaches the entrance to the ureter. Some people feel a vague ache in the side.    Kidney stones may fall into the ureter. Some stones are tiny and pass through without causing symptoms. The ureter is a small tube (approximately 1/8 of an inch wide). A kidney stone can get stuck and block the ureter. If this happens, urine backs up and flows back to the kidney. Back pressure on the kidney can cause:  o Severe flank pain radiating to the groin.  o Severe nausea and vomiting.  o The pain can occur in the lower back, side, groin or all three.      When the stone reaches the lower ureter, this can irritate the bladder and sensations of feeling the urge to urinate frequently and urgently may occur.      Once the stone passes out of your ureter and into your bladder, the symptoms of urgency and frequency will often disappear. Sometimes pain will come back for a short period and will not be as severe as before. The passage of the stone from your bladder and out of your body is usually not a problem. The urethra is at least twice as wide as the normal ureter, so the stone doesn t usually block it.    Strain all urine  If you pass the stone, save it. Place it in the container we have provided and bring it to the [facility] within a week of passing it. Your stone will then be sent for analysis which takes about a month.     Signs and symptoms you might experience    Nausea    Decreased appetite    Urinary frequency    Bloody urine     Chills    Fatigue    When to call [facility] or  go to the Emergency Room    Fever with a temperature greater than 100.1    Severe pain    Persistent nausea/vomiting    If the pain worsens or nausea/vomiting is uncontrolled with medications, STOP eating & drinking. You need to have an empty stomach for 8 hours prior to surgery. Call the [facility] immediately at [phone].           Follow-up    Low dose CT scan with doctor visit 1-2 weeks after initial clinic visit per doctor s instructions    Please cancel the CT scan visit if you pass a stone. Reschedule for a one month follow-up with doctor to discuss stone composition and future prevention.    Preventing future stones    Approximately a month after your stone is sent out for analysis, a prevention visit will occur with your provider, to discuss an individualized plan for prevention of new stones and to discuss managing stones that you may still have. Along with the analysis of the kidney stone, blood and urine tests may be indicated to develop this plan. Knowing the type of kidney stones you make, and why, allows the providers at the [facility] to recommend specific ways to prevent them.    Follow-up visits are an important part of monitoring and preventing future re-occurrences.    The [facility] is available for questions or concerns 24 hours a day at [phone]

## 2022-07-18 NOTE — PROGRESS NOTES
Patient is roomed via telephone for a virtual visit.  Patient confirmed he is in the [facility] at the time of this appointment.  Patient understands that this virtual visit is billable and agree to proceed with appointment.

## 2022-07-25 ENCOUNTER — HOSPITAL ENCOUNTER (OUTPATIENT)
Dept: CT IMAGING | Facility: CLINIC | Age: 82
Discharge: HOME | End: 2022-07-25
Attending: UROLOGY | Admitting: UROLOGY
Payer: COMMERCIAL

## 2022-07-25 ENCOUNTER — VIRTUAL VISIT (OUTPATIENT)
Dept: UROLOGY | Facility: CLINIC | Age: 82
End: 2022-07-25
Payer: COMMERCIAL

## 2022-07-25 DIAGNOSIS — N20.1 CALCULUS OF URETER: ICD-10-CM

## 2022-07-25 DIAGNOSIS — N20.1 CALCULUS OF URETER: Primary | ICD-10-CM

## 2022-07-25 DIAGNOSIS — N20.0 CALCULUS OF KIDNEY: ICD-10-CM

## 2022-07-25 PROCEDURE — 99213 OFFICE O/P EST LOW 20 MIN: CPT | Mod: GT | Performed by: UROLOGY

## 2022-07-25 PROCEDURE — 74176 CT ABD & PELVIS W/O CONTRAST: CPT

## 2022-07-25 ASSESSMENT — PAIN SCALES - GENERAL: PAINLEVEL: NO PAIN (0)

## 2022-07-25 NOTE — PROGRESS NOTES
Assessment/Plan:    Assessment & Plan   Wilfrid was seen today for follow up.    Diagnoses and all orders for this visit:    Calculus of ureter  -     Patient Stated Goal: Prevent further stones    Calculus of kidney  -     Patient Stated Goal: Prevent further stones        Stone Management Plan  Stone Management 7/18/2022 7/25/2022   Urinary Tract Infection No suspicion of infection No suspicion of infection   Renal Colic Asymptomatic at this time Asymptomatic at this time   Renal Failure No suspicion of renal failure No suspicion of renal failure   Current CT date 7/5/2022 7/25/2022   Right sided stones? No No   R Stone Event No current event No current event   Left sided stones? Yes No   L Number of ureteral stones 1 -   L GSD of ureteral stones 3 -   L Location of ureteral stone Proximal -   L Number of kidney stones  1 -   L GSD of kidney stones 2 - 4 -   L Hydronephrosis Mild -   L Stone Event New event Resolved event   Diagnosis date 7/5/2022 -   Initial location of primary symptomatic stone Proximal -   Initial GSD of primary symptomatic stone 3 -   Resolved date - 7/25/2022   L MET Status Initiation -   L Current Plan MET -           PLAN      Phone call duration: 8 minutes  13 minutes spent on the date of the encounter doing chart review, history and exam, documentation and further activities per the note    RAMAN YN MD  [facility]    Subjective:     HPI  Mr. Wilfrid Ortez is a 81 year old  male who is being evaluated via a billable telephone visit by [facility] for medical expulsive therapy follow up.     On last encounter, his 3 mm stone was in left proximal ureter with Mild hydronephrosis. He has had no unanticipated events.    Symptoms have been minimal . He is asymptomatic at present. He denies symptoms of fever, chills, flank pain, nausea, vomiting, urinary frequency and dysuria.     New CT scan was personally  reviewed and demonstrates passage of both ureteral and renal stones with no hydronephrosis.     He is congratulated on passing his stone and will not proceed with stone risk evaluation .         ROS   Review of systems is negative except for HPI.    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      Sleep apnea     CPAP, hasn't used in a year       Past Surgical History:   Procedure Laterality Date     TRANSRECTAL ULTRASONIC, TRANSURETHRAL RESECTION (TUR) OF PROSTATE CYST       ZZC TOTAL HIP ARTHROPLASTY Right 5/4/2021    Procedure: RIGHT TOTAL HIP ARTHROPLASTY;  Surgeon: Frandy Clinton MD;  Location: [facility];  Service: Orthopedics       Current Outpatient Medications   Medication Sig Dispense Refill     albuterol (PROAIR HFA/PROVENTIL HFA/VENTOLIN HFA) 108 (90 Base) MCG/ACT inhaler Inhale 2 puffs into the lungs every 4 hours as needed for shortness of breath / dyspnea or wheezing 18 g 1     aspirin (ASA) 325 MG EC tablet Take 325 mg by mouth daily       codeine-guaiFENesin 200-10 MG/5ML LIQD 1 to 2 teaspoons 4 times per day as needed for cough 473 mL 0     gabapentin (NEURONTIN) 300 MG capsule 300mg TID 90 capsule 1     latanoprost (XALATAN) 0.005 % ophthalmic solution 1 drop       LORazepam (ATIVAN) 0.5 MG tablet Take 0.5 mg by mouth every 6 hours as needed for anxiety Patient takes 0.5 mg tab at nigj       meclizine (ANTIVERT) 25 MG tablet Take 1 tablet (25 mg) by mouth 3 times daily as needed for dizziness 90 tablet 11     omeprazole (PRILOSEC) 20 MG DR capsule Take 1 capsule (20 mg) by mouth daily 90 capsule 2     omeprazole (PRILOSEC) 20 MG DR  capsule TAKE 1 CAPSULE(20 MG) BY MOUTH DAILY (Patient not taking: No sig reported) 90 capsule 2     oxyCODONE (ROXICODONE) 5 MG tablet Take 1 tablet (5 mg) by mouth every 6 hours as needed for severe pain 12 tablet 0     PARoxetine (PAXIL) 10 MG tablet Take 10 mg by mouth At Bedtime        PARoxetine (PAXIL) 40 MG tablet Take 40 mg by mouth every morning        predniSONE (DELTASONE) 20 MG tablet Take 1 tablet (20 mg) by mouth 2 times daily (Patient not taking: Reported on 7/11/2022) 10 tablet 0     propranolol (INDERAL) 10 MG tablet Take 1 tablet (10 mg) by mouth 2 times daily 180 tablet 1     simvastatin (ZOCOR) 20 MG tablet Take 20 mg by mouth At Bedtime         Allergies   Allergen Reactions     Ibuprofen      nausea       Social History     Socioeconomic History     Marital status: Single     Spouse name: Not on file     Number of children: Not on file     Years of education: Not on file     Highest education level: Not on file   Occupational History     Not on file   Tobacco Use     Smoking status: Never Smoker     Smokeless tobacco: Never Used   Substance and Sexual Activity     Alcohol use: Not Currently     Alcohol/week: 1.0 standard drink     Comment: Alcoholic Drinks/day: rare     Drug use: No     Sexual activity: Not on file   Other Topics Concern     Parent/sibling w/ CABG, MI or angioplasty before 65F 55M? Not Asked   Social History Narrative     Not on file     Social Determinants of Health     Financial Resource Strain: Not on file   Food Insecurity: Not on file   Transportation Needs: Not on file   Physical Activity: Not on file   Stress: Not on file   Social Connections: Not on file   Intimate Partner Violence: Not on file   Housing Stability: Not on file       Family History   Problem Relation Age of Onset     Diabetes Brother      Throat cancer Mother      Myocardial Infarction Father      Alcoholism Brother      Cancer Mother         throat     Diabetes Mother      Heart Disease Father         Objective:     No vitals or physical exam obtained due to virtual visit  Labs     Most Recent 3 CBC's:Recent Labs   Lab Test 10/28/21  0848 05/06/21  0603 05/05/21  0611 04/20/21 0927 04/01/21 1946   WBC 9.2  --   --  7.6 6.5   HGB 13.5 9.3* 11.3* 14.6 14.5   MCV 87  --   --  88 89   *  --   --  158 158     Most Recent 3 BMP's:Recent Labs   Lab Test 05/03/22  1144 12/27/21  1006 10/28/21  0848 05/04/21  0913 04/20/21 0927 04/01/21  1946   NA  --   --  138  --  137 140   POTASSIUM  --   --  4.0  --  4.6 4.3   CHLORIDE  --   --  100  --  98 102   CO2  --   --  29  --  30 31   BUN  --   --  13  --  19 18   CR  --   --  1.21  --  1.23 1.20   ANIONGAP  --   --  9  --  9 7   BRYANNA  --   --  9.5  --  9.4 9.1   * 119 149*   < > 135* 107    < > = values in this interval not displayed.     Most Recent Urinalysis:Recent Labs   Lab Test 04/20/21  0938   COLOR Yellow   APPEARANCE Clear   URINEGLC 100 mg/dL*   URINEBILI Negative   URINEKETONE Negative   SG 1.020   UBLD Negative   URINEPH 6.0   PROTEIN Negative   UROBILINOGEN 2.0 E.U./dL*   NITRITE Negative   LEUKEST Negative     Acute Labs Urine Culture  No results found for: CULTURE

## 2022-07-25 NOTE — PATIENT INSTRUCTIONS
Patient Stated Goal: Prevent further stones  Calcium Oxalate Stone Prevention Self Management    Drink more fluids:    Drinking more liquids is the best way you can help prevent future stones. Stones can form when substances in the urine are too concentrated. The more you drink, the more urine you will make. This means all substances in the urine will be less concentrated.    How much urine should I be producing?    The usual recommended daily urine production is about 2 to 3 quarts (3227-4738 ml). If you are producing more than 3 quarts of urine on a regular basis, it is possible to deplete important minerals stored in the body.    To measure the amount of urine you produce in a day, you can either:    Collect all urine in a container and measure at the end of the day     Use a measuring cup each time you urinate and add up the amounts at the end of the day     Observe    Color - Dark osbaldo urine is concentrated. Light straw color or lighter is dilute and desirable     Odor - Concentrated urine tends to smell stronger. Dilute urine is nearly odorless    Ways to increase your fluid intake    Increasing the amount of fluid you drink is effective for all types of kidney stones. While water is commonly recommended, all fluids are effective for increasing the amount of urine your body produces.    Focus on starting a lifelong habit, rather than a short-term solution.     Keep liquids on hand that you like. Crystal Light is a low calorie appropriate choice.    Drink out of larger glasses. You'll tend to drink more with each serving.     Have an additional glass of fluid with each meal.     Keep a water or drink bottle at work and fill it regularly.     *If you are prone to fluid retention, consult your doctor before making changes to your fluid habits.    Low Oxalate Diet:    Avoid excess amounts or daily consumption of these foods:    All nuts and nut products including peanuts, almonds, pecans, peanut butter, almond  milk    Rhubarb    Chocolate    Soybeans and soy products     Spinach    Wheat Germ    Beets    Maintain a normal calcium diet:    Researches have found that people with low calcium intakes tend to have more stones. Foods with high calcium content are acceptable and include:    Dairy products (including milk, cheese and yogurt)    Meat and fish    Enriched cereals    Dark green vegetables    What about calcium supplements?     Many people take calcium supplements, either on their own or as prescribed by a doctor. Research has indicated that calcium supplements do not usually pose a risk for stone formation.  Calcium citrate is a better choice for a supplement.    Avoid excess salt:    Salt (sodium chloride) is found in abundance in many foods. High sodium levels in the urine can interfere with the kidney's handling of calcium.     Tips for reducing the salt in your diet:    Don't use salt at the table    Reduce the salt used in food preparation. Try 1/2 teaspoon when recipes call for 1 teaspoon.    Use herbs and spices for flavoring instead of salt.    Avoid salty foods.    Check the label before you buy or use a product. Note sodium and portion size information.    Try to consume less than 2,000 mg/day. (1 teaspoon = 2,000 mg)    Foods with high sodium content include:    Processed meat (including luncheon meats, sausage)     Crackers     Instant cereal     Processed cheese     Canned soups     Chips and snack foods     Soy sauce    The [facility] can respond to your questions or concerns 24 hours a day at [phone].

## 2022-07-25 NOTE — PROGRESS NOTES
Patient is roomed via telephone for a virtual visit.  Patient confirmed he is in the [facility] at the time of this appointment.  Patient understands that this virtual visit is billable and agree to proceed with appointment.

## 2022-08-04 ENCOUNTER — OFFICE VISIT (OUTPATIENT)
Dept: INTERNAL MEDICINE | Facility: CLINIC | Age: 82
End: 2022-08-04
Payer: COMMERCIAL

## 2022-08-04 VITALS
OXYGEN SATURATION: 98 % | WEIGHT: 150 LBS | RESPIRATION RATE: 17 BRPM | TEMPERATURE: 97.8 F | BODY MASS INDEX: 24.99 KG/M2 | DIASTOLIC BLOOD PRESSURE: 76 MMHG | HEART RATE: 62 BPM | HEIGHT: 65 IN | SYSTOLIC BLOOD PRESSURE: 130 MMHG

## 2022-08-04 DIAGNOSIS — E11.8 TYPE 2 DIABETES MELLITUS WITH COMPLICATION, WITHOUT LONG-TERM CURRENT USE OF INSULIN (H): Primary | ICD-10-CM

## 2022-08-04 LAB
CHOLEST SERPL-MCNC: 144 MG/DL
FASTING STATUS PATIENT QL REPORTED: ABNORMAL
GLUCOSE SERPL-MCNC: 175 MG/DL (ref 70–99)
HBA1C MFR BLD: 7.5 % (ref 0–5.6)
HDLC SERPL-MCNC: 50 MG/DL
HIV 1+2 AB+HIV1 P24 AG SERPL QL IA: NONREACTIVE
LDLC SERPL CALC-MCNC: 70 MG/DL
NONHDLC SERPL-MCNC: 94 MG/DL
TRIGL SERPL-MCNC: 120 MG/DL

## 2022-08-04 PROCEDURE — 99213 OFFICE O/P EST LOW 20 MIN: CPT | Performed by: INTERNAL MEDICINE

## 2022-08-04 PROCEDURE — 80061 LIPID PANEL: CPT | Performed by: INTERNAL MEDICINE

## 2022-08-04 PROCEDURE — 36415 COLL VENOUS BLD VENIPUNCTURE: CPT | Performed by: INTERNAL MEDICINE

## 2022-08-04 PROCEDURE — 83036 HEMOGLOBIN GLYCOSYLATED A1C: CPT | Performed by: INTERNAL MEDICINE

## 2022-08-04 PROCEDURE — 82947 ASSAY GLUCOSE BLOOD QUANT: CPT | Performed by: INTERNAL MEDICINE

## 2022-08-04 PROCEDURE — 87389 HIV-1 AG W/HIV-1&-2 AB AG IA: CPT | Performed by: INTERNAL MEDICINE

## 2022-08-04 RX ORDER — ACETAMINOPHEN 500 MG
500-1000 TABLET ORAL
COMMUNITY
Start: 2022-07-05 | End: 2023-08-30

## 2022-08-04 ASSESSMENT — PATIENT HEALTH QUESTIONNAIRE - PHQ9
SUM OF ALL RESPONSES TO PHQ QUESTIONS 1-9: 10
10. IF YOU CHECKED OFF ANY PROBLEMS, HOW DIFFICULT HAVE THESE PROBLEMS MADE IT FOR YOU TO DO YOUR WORK, TAKE CARE OF THINGS AT HOME, OR GET ALONG WITH OTHER PEOPLE: SOMEWHAT DIFFICULT
SUM OF ALL RESPONSES TO PHQ QUESTIONS 1-9: 10

## 2022-08-04 ASSESSMENT — PAIN SCALES - GENERAL: PAINLEVEL: NO PAIN (0)

## 2022-08-04 NOTE — LETTER
August 5, 2022      Murali Ortez  [address]   [place] [address]        Dear ,    We are writing to inform you of your test results.    Ok on the blood sugar and excellent lipids!     Resulted Orders   Glucose   Result Value Ref Range    Glucose 175 (H) 70 - 99 mg/dL    Patient Fasting > 8hrs? Unknown    Hemoglobin A1c   Result Value Ref Range    Hemoglobin A1C 7.5 (H) 0.0 - 5.6 %      Comment:      Normal <5.7%   Prediabetes 5.7-6.4%    Diabetes 6.5% or higher     Note: Adopted from ADA consensus guidelines.   Lipid panel reflex to direct LDL Fasting   Result Value Ref Range    Cholesterol 144 <200 mg/dL    Triglycerides 120 <150 mg/dL    Direct Measure HDL 50 >=40 mg/dL    LDL Cholesterol Calculated 70 <=100 mg/dL    Non HDL Cholesterol 94 <130 mg/dL    Narrative    Cholesterol  Desirable:  <200 mg/dL    Triglycerides  Normal:  Less than 150 mg/dL  Borderline High:  150-199 mg/dL  High:  200-499 mg/dL  Very High:  Greater than or equal to 500 mg/dL    Direct Measure HDL  Female:  Greater than or equal to 50 mg/dL   Male:  Greater than or equal to 40 mg/dL    LDL Cholesterol  Desirable:  <100mg/dL  Above Desirable:  100-129 mg/dL   Borderline High:  130-159 mg/dL   High:  160-189 mg/dL   Very High:  >= 190 mg/dL    Non HDL Cholesterol  Desirable:  130 mg/dL  Above Desirable:  130-159 mg/dL  Borderline High:  160-189 mg/dL  High:  190-219 mg/dL  Very High:  Greater than or equal to 220 mg/dL   HIV Antigen Antibody Combo   Result Value Ref Range    HIV Antigen Antibody Combo Nonreactive Nonreactive      Comment:      HIV-1 p24 Ag & HIV-1/HIV-2 Ab Not Detected       If you have any questions or concerns, please call the clinic at the number listed above.       Sincerely,      Mayito Carlos MD
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
## 2022-08-09 ENCOUNTER — NURSE TRIAGE (OUTPATIENT)
Dept: NURSING | Facility: CLINIC | Age: 82
End: 2022-08-09

## 2022-08-09 NOTE — TELEPHONE ENCOUNTER
Pt is calling.    Pt was seen at [facility] today and had a procedure on his back. They told him that he has bed bugs. They found them in the room that he was in.  A friend was with him in the room. He took something out of his hair. He put it in the sink. They found it there, along with 10 other bugs in the room, in the area where he was.  They told him to call his PCP.  No rash seen. He is itching on his hands and legs only, but only after getting home today and hearing that he had bed bugs.   He did see some bed bugs in his bed and he did call a pest control service as well.     Care advice reviewed. Wash everything in hot, and/or put in the dryer on hot for at least 20 min.  Shower daily.  Only wear clothes after they go through the dryer and washer.  No treatment needed for him at this time.  He is aware and agreed to follow advice given.      Jelena Landers RN  [facility] Nurse Advisor  8/9/2022 at 6:23 PM          Reason for Disposition    Eliminating bed bugs from the home, questions about    Additional Information    Negative: Anaphylactic reaction suspected (e.g., sudden onset of difficulty breathing, difficulty swallowing, wheezing following bite)    Negative: Sounds like a life-threatening emergency to the triager    Negative: Widespread hives    Negative: Impetigo suspected (i.e., painless infected superficial small sores, < 1 inch or 2.5 cm, often covered by a soft, yellow-brown scab or crust; sometimes occurring near nasal openings)    Negative: Other insect bite suspected    Negative: Doesn't match the SYMPTOMS of bed bug bites    Negative: Patient sounds very sick or weak to the triager    Negative: [1] Fever AND [2] red area    Negative: [1] Fever AND [2] area is very tender to touch    Negative: [1] Red streak or red line AND [2] length > 2 inches (5 cm)    Negative: [1] Red or very tender (to touch) area AND [2] started over 24 hours after the bite    Negative: [1] Red or very tender (to  touch) area AND [2] getting larger over 48 hours after the bite    Negative: [1] SEVERE local itching (i.e., interferes with work, school, sleep) AND [2] not improved after 24 hours of hydrocortisone cream    Negative: [1] After 7 days AND [2] rash from bed bug bites is not improving    Negative: [1] After 14 days AND [2] bed bug bite isn't healed    Negative: Diagnosis of bed bug bites is uncertain    Negative: [1] Scab is present AND [2] it drains pus or increases in size AND [3] not improved after applying antibiotic ointment for 2 days    Negative: [1] Scab is present AND [2] it drains pus or increases in size    Negative: Bed bug bites    Negative: Bed bugs, questions about    Protocols used: BED BUG BITE-A-

## 2022-08-10 ENCOUNTER — TELEPHONE (OUTPATIENT)
Dept: NURSING | Facility: CLINIC | Age: 82
End: 2022-08-10

## 2022-08-10 ENCOUNTER — TELEPHONE (OUTPATIENT)
Dept: INTERNAL MEDICINE | Facility: CLINIC | Age: 82
End: 2022-08-10

## 2022-08-10 NOTE — TELEPHONE ENCOUNTER
"Bed bug situation at his apartment.  He spoke to Jelena last night.  Was told if he has other problems to call back.    Triage note from last night:    \"MidkiffJelena contreras [place]    8/9/22 6:26 PM  Note     Pt is calling.     Pt was seen at [facility] today and had a procedure on his back. They told him that he has bed bugs. They found them in the room that he was in.  A friend was with him in the room. He took something out of his hair. He put it in the sink. They found it there, along with 10 other bugs in the room, in the area where he was.  They told him to call his PCP.  No rash seen. He is itching on his hands and legs only, but only after getting home today and hearing that he had bed bugs.   He did see some bed bugs in his bed and he did call a pest control service as well.      Care advice reviewed. Wash everything in hot, and/or put in the dryer on hot for at least 20 min.  Shower daily.  Only wear clothes after they go through the dryer and washer.  No treatment needed for him at this time.  He is aware and agreed to follow advice given.        Jelena Landers RN  [facility] Nurse Advisor  8/9/2022 at 6:23 PM            Now has bed bug bites on body.  Fingers are turning orange.    Said a cream was mentioned that may help him with his bites. He's asking to have this prescribed asap.    Pharmacy: [facility] in [place] on [address].      Patient would like to be called once this has been addressed.[phone].    Routing high priority to pcp. Please have another provider address this asap if Dr Carlos unavailable.    Ruth SHORE RN [place] [facility]         "

## 2022-08-29 NOTE — PROGRESS NOTES
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  TGA        April 1, 2021        Since last seen no further episodes of \"amnesia\"         EEG 8/23/2021, shows a little bit of mild 5-6 Hz theta disorganization on the right hemisphere rarely         EEG 2016 showed a little bit of the left theta disorganization 5-6 Hz        This is a fairly nonspecific finding prefer not to add any antiepileptic medications unless he had stereotypical episodes with a discrete onset and offset.    MoCA  4/21/2021,        24 out of 30  8/30/2021,        27 out of 30    Patient thought that his memory was maybe a little bit worse but when we did actual testing he did well seems to be stable  Talked about good routines  Talked about exercise  Talked about reasonable nutrition  Talked about good sleep cycle  Also talked about music he is a  does have a keyboard and may be doing that a little bit would help stimulate the brain        B.  Essential tremor        Head to the [place] is also left-handed and has some left hand tremor        In regards to his essential tremor he is moved into an apartment        He is not out in about quite as much       He uses propranolol 10 mg twice daily and sometimes 3 times daily in the past        Has more trouble when he is trying to eat dinner due to the action component        Talked about some of the newer treatments that are out but I do not feel this tremor is bad enough to warrant those he agreed    C.  Vertigo         patient was in the ER on June 24, 2021 with vertigo       Patient blamed it on actually missing his morning medicines that day which " "include Paxil but sometimes an abrupt change in dose can cause unsteadiness/dizziness       Seem to respond to treatment with meclizine and some exercises       Work-up showed a CTA with questionable changes on the intracranial vessels but there was motion artifact       Follow-up MRA showed no significant intracranial stenosis        Laboratory data reviewed    D.  Kidney stones 7/5/2022 passed them without intervention              Past neurologic history review:  April 1, 2021 had difficulty with amnesia  He had woke up in the afternoon at about 12:30 PM on 4/1/2021.  He seemed to be forgetting events of the morning.  It seemed that the memory difficulty or amnesia was transient  He was alone at the time that this was occurring  He went on to have an MRI scan of the brain that was unremarkable    Patient had seen a psychiatrist on March 18, 2021  Has had 8 panic attacks over 2 months.  More than usual    Patient states that on April 1, 2021 he had gotten up in the morning had breakfast  He then picked up a friend and took his friend from the [facility] over to the [facility] to drive him off for an appointment  He felt awful he was driving over there  He dropped him off and then left him  When he came home he talk to his partner stated that he was not feeling well  He laid down  He then awoke later and seemed to have forgotten what had happened over at least an hour to couple hours    10 to 15 years ago he was up at [facility] with his brother he was out on a boat  He supposedly \"passed out\" in [facility] 7 PM in the evening  Did not have any recollection  His 81 mg of aspirin was increased to full aspirin possible TIA  Unclear if that was an amnestic spell    He is on some propranolol 10 mg twice daily for his tremor cannot remember the middle of the day dose so he only takes it twice a day  Does have some inhalers but does not get exacerbated by his propranolol    Formal [place] testing today " was 24 out of 30    The patient does have difficulty with some head tremor, some voice tremor, and some hand tremor.    The patient notices the tremor more when he is more stressed or anxious.    He does have a past history of panic attacks, which are variable and uses lorazepam for this.        Past medical history    1. History of action tremor going on for five plus years evaluated by Dr. Michi Alanis in the past in 2016.  2. 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  3. Has panic attacks, is on lorazepam, which may give some of the fatigue.  4. Has a past note mentioning some mild memory loss.  5. Hyperlipidemia.  6. Obstructive sleep apnea going on for greater than six plus years.  7. Some visual changes with loss of vision in the left eye and droopiness of the right eye.  8.  Left carpal tunnel syndrome  9.  History of panic attacks with dizziness and off-balance sensation  10.  Diabetes mellitus.  Hemoglobin A1c's 6.9%, 6.6%, 7.3%    Past medical history  Anxiety/panic attacks  Hyperlipidemia  Diabetes  Benign prostatic hypertrophy  Hiatal hernia  Obstructive sleep apnea  Blepharospasm      Habits  Does not smoke  Does not drink alcohol  Patient is left-handed  Goals right-handed  Goals left-handed  Mouse on computer uses the right hand      Family history  Father with heart disease  at 57 also had rheumatic fever  Mother with cancer of the throat  at 72 also had diabetes mellitus.   Brother with epilepsy, DM  No family history of tremor    Past work-up  1. MRI scan of the head December 15, 2016 small vessel changes, small encephalomalacia in the posterior body of  the corpus callosum.  2. EEG on December 15, 2016, dysrhythmia grade II read by Dr. Taylor with some 8 Hz posterior dominant rhythm, some 5 to 6 Hz theta, a little bit of left temporal slowing.    Recent work-up  MRI scan brain 2021  A.  No acute stroke mass or bleed  B.  Mild to moderate  atrophy and chronic small vessel changes  C.  Mild to moderate cerebellar atrophy   laboratory data April 1, 2021  Sodium 140 potassium 4.3  BUN 18 creatinine 1.2  Glucose 107  White blood count 6.5, hemoglobin 14.5, platelets 154,000  [place] cognitive assessment score April 2, 2021,24 out of 30  Previous writing samples in the chart.    CT scan head 6/24/2021  A. No CT evidence for acute intracranial process.   B. Brain atrophy and presumed chronic microvascular ischemic changes as above.   HEAD CTA: 6/24/2021  1.  Apparent loss of contrast opacification involving a diminutive in size, proximal to mid left M2 anterior temporal division middle cerebral artery with distal reconstitution. There is motion artifact in this region and this finding could be artifactual, however, a high-grade stenosis versus short segment occlusion with distal reconstitution are other considerations. Correlation for left MCA territory neurologic deficit is recommended consider MRI for further assessment.   2.  Short segment moderate to severe stenosis versus motion artifact of a mid right M2 ascending division middle cerebral artery.   3.  No proximal large vessel occlusion, aneurysm or high flow vascular malformation.   NECK CTA: 6/24/2021  1.  No significant stenosis or dissection.    MRI scan head 6/25/2021  A.  No acute findings.   B.  Mild age-related changes.   HEAD MRA: 6/25/2021  No stenosis/occlusion, aneurysm, or high flow vascular malformation.   NECK MRA:   No measurable stenosis or dissection.  Hemoglobin A1c 6.4% (7/6/2021)  HDL 50/, (7/6/2021)  EEG 8/23/2021, 8 Hz predominant rhythm, rare 5 to 6 Hz theta disorganization of the right hemisphere no specific epileptiform discharges      MoCA    4/21/2021,        24 out of 30  8/30/2021,        27 out of 30         Review of Systems   No headache no chest pain no shortness of breath no nausea vomiting no diarrhea no fever chills  No cough  No diplopia dysarthria  "dysphagia    Chronic tremor    Memory stabilized    No chest pain.   No abdominal pain  No recent illness    General exam  Blood pressure 128/86, pulse 93  HEENT normal  Lungs clear no wheezing  Heart rate regular  Abdomen soft  Symmetrical pulses  No edema the feet      Neurologic exam  Alert oriented x3  Normal prosody speech  Normal naming  Normal repetition  Normal comprehension  No neglect  Memory okay in the past has had some very subtle difficulty with memory    Formal [place] score       27 out of 30, 8/30/2022    Cranials 2 through 12  No ophthalmoplegia  No nystagmus  Face symmetrical  Decreased visual acuity left eye chronic  No visual field cut  Tongue twisters okay  Hypophonic voice      Parkinsonian features  Slight decrease in blink  Soft voice  Action tremor  Slight voice tremor  Not much head to titubation  Left eyelid heavier than right chronic      Upper extremities  No drift  Mild action tremor  Finger-nose okay    Lower extremities  Test in the seated position good strength    Gait  Got up easier today than last visit  Has a click or problem with his hip  Actually did fairly well getting up from the chair today compared to before  Ambulated without a assistive device up and down the hallway    Reflexes symmetrical  No Swift reflex        Assessment/Plan       1.   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        EEG 2016 mild left theta disorganization rare        EEG 2021 mild right theta disorganization rare        Spell is not frequent enough or stereotypical enough to warrant trial of medication discussed with patient    2.  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  CTS (carpal tunnel syndrome) " (G56.02)        Patient is left-handed       Patient does play piano for most of his life       EMG September 11, 2019 shows moderate left CTS with active and chronic denervation      Complains of left shoulder pain radicular EMG 2019 negative for radicular changes        4.  Essential tremor (G25.0)        Essential tremor going on as far back as high school.       Anxiety disorder that exacerbates the tremor, which involves his head, speech, arms, right greater than left.         Continue propranolol 10 mg tablet 2 times per day as needed         Patient feels is helping no wheezing      Discussed multiple issues as above  Follow-up and 1 year    32 minutes total care time today        As part of visit today  Review primarily notes 8/4/2022, diabetes  Reviewed ER notes 7/5/2022, kidney stones  Reviewed ER visit 7/5/2022

## 2022-08-30 ENCOUNTER — OFFICE VISIT (OUTPATIENT)
Dept: NEUROLOGY | Facility: CLINIC | Age: 82
End: 2022-08-30
Payer: COMMERCIAL

## 2022-08-30 VITALS
HEIGHT: 65 IN | HEART RATE: 93 BPM | DIASTOLIC BLOOD PRESSURE: 86 MMHG | WEIGHT: 148 LBS | BODY MASS INDEX: 24.66 KG/M2 | SYSTOLIC BLOOD PRESSURE: 128 MMHG

## 2022-08-30 DIAGNOSIS — R42 VERTIGO: ICD-10-CM

## 2022-08-30 DIAGNOSIS — G25.0 BENIGN ESSENTIAL TREMOR: ICD-10-CM

## 2022-08-30 DIAGNOSIS — G45.4 TRANSIENT GLOBAL AMNESIA: Primary | ICD-10-CM

## 2022-08-30 PROCEDURE — 99214 OFFICE O/P EST MOD 30 MIN: CPT | Performed by: PSYCHIATRY & NEUROLOGY

## 2022-08-30 RX ORDER — PROPRANOLOL HYDROCHLORIDE 10 MG/1
10 TABLET ORAL 2 TIMES DAILY
Qty: 180 TABLET | Refills: 3 | Status: SHIPPED | OUTPATIENT
Start: 2022-08-30 | End: 2023-04-13

## 2022-08-30 ASSESSMENT — MONTREAL COGNITIVE ASSESSMENT (MOCA)
9. REPEAT EACH SENTENCE: 1
7. [VIGILENCE] TAP WHEN HEARING DESIGNATED LETTER: 1
12. MEMORY INDEX SCORE: 5
WHAT LEVEL OF EDUCATION WAS ATTAINED: 0
4. NAME EACH OF THE THREE ANIMALS SHOWN: 2
6. READ LIST OF DIGITS [FORWARD/BACKWARD]: 2
VISUOSPATIAL/EXECUTIVE SUBSCORE: 4
10. [FLUENCY] NAME WORDS STARTING WITH DESIGNATED LETTER: 1
8. SERIAL SUBTRACTION OF 7S: 3
WHAT IS THE TOTAL SCORE (OUT OF 30): 27
13. ORIENTATION SUBSCORE: 6
11. FOR EACH PAIR OF WORDS, WHAT CATEGORY DO THEY BELONG TO (OUT OF 2): 2

## 2022-08-30 NOTE — NURSING NOTE
Chief Complaint   Patient presents with     Transient global amnesia     Annual follow up. Pt states memory has worsened.     Ariadna Vincent LPN on 8/30/2022 at 10:32 AM

## 2022-08-30 NOTE — LETTER
"    8/30/2022         RE: Wilfrid Ortez  [address]  [place] [address]        Dear Colleague,    Thank you for referring your patient, Wilfrid Ortez, to the [facility] [place]. Please see a copy of my visit note below.    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  TGA        April 1, 2021        Since last seen no further episodes of \"amnesia\"         EEG 8/23/2021, shows a little bit of mild 5-6 Hz theta disorganization on the right hemisphere rarely         EEG 2016 showed a little bit of the left theta disorganization 5-6 Hz        This is a fairly nonspecific finding prefer not to add any antiepileptic medications unless he had stereotypical episodes with a discrete onset and offset.    MoCA  4/21/2021,        24 out of 30  8/30/2021,        27 out of 30    Patient thought that his memory was maybe a little bit worse but when we did actual testing he did well seems to be stable  Talked about good routines  Talked about exercise  Talked about reasonable nutrition  Talked about good sleep cycle  Also talked about music he is a  does have a keyboard and may be doing that a little bit would help stimulate the brain        B.  Essential tremor        Head to the [place] is also left-handed and has some left hand tremor        In regards to his essential tremor he is moved into an apartment        He is not out in about quite as much       He uses propranolol 10 mg twice daily and sometimes 3 times daily in the past        Has more trouble when he is trying to eat dinner due to the action component        Talked " "about some of the newer treatments that are out but I do not feel this tremor is bad enough to warrant those he agreed    C.  Vertigo         patient was in the ER on June 24, 2021 with vertigo       Patient blamed it on actually missing his morning medicines that day which include Paxil but sometimes an abrupt change in dose can cause unsteadiness/dizziness       Seem to respond to treatment with meclizine and some exercises       Work-up showed a CTA with questionable changes on the intracranial vessels but there was motion artifact       Follow-up MRA showed no significant intracranial stenosis        Laboratory data reviewed    D.  Kidney stones 7/5/2022 passed them without intervention              Past neurologic history review:  April 1, 2021 had difficulty with amnesia  He had woke up in the afternoon at about 12:30 PM on 4/1/2021.  He seemed to be forgetting events of the morning.  It seemed that the memory difficulty or amnesia was transient  He was alone at the time that this was occurring  He went on to have an MRI scan of the brain that was unremarkable    Patient had seen a psychiatrist on March 18, 2021  Has had 8 panic attacks over 2 months.  More than usual    Patient states that on April 1, 2021 he had gotten up in the morning had breakfast  He then picked up a friend and took his friend from the [facility] over to the [facility] to drive him off for an appointment  He felt awful he was driving over there  He dropped him off and then left him  When he came home he talk to his partner stated that he was not feeling well  He laid down  He then awoke later and seemed to have forgotten what had happened over at least an hour to couple hours    10 to 15 years ago he was up at [facility] with his brother he was out on a boat  He supposedly \"passed out\" in [facility] 7 PM in the evening  Did not have any recollection  His 81 mg of aspirin was increased to full aspirin possible " TIA  Unclear if that was an amnestic spell    He is on some propranolol 10 mg twice daily for his tremor cannot remember the middle of the day dose so he only takes it twice a day  Does have some inhalers but does not get exacerbated by his propranolol    Formal [place] testing today was 24 out of 30    The patient does have difficulty with some head tremor, some voice tremor, and some hand tremor.    The patient notices the tremor more when he is more stressed or anxious.    He does have a past history of panic attacks, which are variable and uses lorazepam for this.        Past medical history    1. History of action tremor going on for five plus years evaluated by Dr. Michi Alanis in the past in 2016.  2. 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  3. Has panic attacks, is on lorazepam, which may give some of the fatigue.  4. Has a past note mentioning some mild memory loss.  5. Hyperlipidemia.  6. Obstructive sleep apnea going on for greater than six plus years.  7. Some visual changes with loss of vision in the left eye and droopiness of the right eye.  8.  Left carpal tunnel syndrome  9.  History of panic attacks with dizziness and off-balance sensation  10.  Diabetes mellitus.  Hemoglobin A1c's 6.9%, 6.6%, 7.3%    Past medical history  Anxiety/panic attacks  Hyperlipidemia  Diabetes  Benign prostatic hypertrophy  Hiatal hernia  Obstructive sleep apnea  Blepharospasm      Habits  Does not smoke  Does not drink alcohol  Patient is left-handed  Goals right-handed  Goals left-handed  Mouse on computer uses the right hand      Family history  Father with heart disease  at 57 also had rheumatic fever  Mother with cancer of the throat  at 72 also had diabetes mellitus.   Brother with epilepsy, DM  No family history of tremor    Past work-up  1. MRI scan of the head December 15, 2016 small vessel changes, small encephalomalacia in the posterior body of  the corpus  callosum.  2. EEG on December 15, 2016, dysrhythmia grade II read by Dr. Taylor with some 8 Hz posterior dominant rhythm, some 5 to 6 Hz theta, a little bit of left temporal slowing.    Recent work-up  MRI scan brain April 1, 2021  A.  No acute stroke mass or bleed  B.  Mild to moderate atrophy and chronic small vessel changes  C.  Mild to moderate cerebellar atrophy   laboratory data April 1, 2021  Sodium 140 potassium 4.3  BUN 18 creatinine 1.2  Glucose 107  White blood count 6.5, hemoglobin 14.5, platelets 154,000  L[place]oy cognitive assessment score April 2, 2021,24 out of 30  Previous writing samples in the chart.    CT scan head 6/24/2021  A. No CT evidence for acute intracranial process.   B. Brain atrophy and presumed chronic microvascular ischemic changes as above.   HEAD CTA: 6/24/2021  1.  Apparent loss of contrast opacification involving a diminutive in size, proximal to mid left M2 anterior temporal division middle cerebral artery with distal reconstitution. There is motion artifact in this region and this finding could be artifactual, however, a high-grade stenosis versus short segment occlusion with distal reconstitution are other considerations. Correlation for left MCA territory neurologic deficit is recommended consider MRI for further assessment.   2.  Short segment moderate to severe stenosis versus motion artifact of a mid right M2 ascending division middle cerebral artery.   3.  No proximal large vessel occlusion, aneurysm or high flow vascular malformation.   NECK CTA: 6/24/2021  1.  No significant stenosis or dissection.    MRI scan head 6/25/2021  A.  No acute findings.   B.  Mild age-related changes.   HEAD MRA: 6/25/2021  No stenosis/occlusion, aneurysm, or high flow vascular malformation.   NECK MRA:   No measurable stenosis or dissection.  Hemoglobin A1c 6.4% (7/6/2021)  HDL 50/, (7/6/2021)  EEG 8/23/2021, 8 Hz predominant rhythm, rare 5 to 6 Hz theta disorganization of the right  "hemisphere no specific epileptiform discharges      MoCA    4/21/2021,        24 out of 30  8/30/2021,        27 out of 30         Review of Systems   No headache no chest pain no shortness of breath no nausea vomiting no diarrhea no fever chills  No cough  No diplopia dysarthria dysphagia    Chronic tremor    Memory stabilized    No chest pain.   No abdominal pain  No recent illness    General exam  Blood pressure 128/86, pulse 93  HEENT normal  Lungs clear no wheezing  Heart rate regular  Abdomen soft  Symmetrical pulses  No edema the feet      Neurologic exam  Alert oriented x3  Normal prosody speech  Normal naming  Normal repetition  Normal comprehension  No neglect  Memory okay in the past has had some very subtle difficulty with memory    Formal [place] score       27 out of 30, 8/30/2022    Cranials 2 through 12  No ophthalmoplegia  No nystagmus  Face symmetrical  Decreased visual acuity left eye chronic  No visual field cut  Tongue twisters okay  Hypophonic voice      Parkinsonian features  Slight decrease in blink  Soft voice  Action tremor  Slight voice tremor  Not much head to titubation  Left eyelid heavier than right chronic      Upper extremities  No drift  Mild action tremor  Finger-nose okay    Lower extremities  Test in the seated position good strength    Gait  Got up easier today than last visit  Has a click or problem with his hip  Actually did fairly well getting up from the chair today compared to before  Ambulated without a assistive device up and down the hallway    Reflexes symmetrical  No Swift reflex        Assessment/Plan       1.   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        EEG 2016 mild left theta disorganization rare        EEG 2021 mild right theta disorganization rare        Spell is not " frequent enough or stereotypical enough to warrant trial of medication discussed with patient    2.  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  CTS (carpal tunnel syndrome) (G56.02)        Patient is left-handed       Patient does play piano for most of his life       EMG September 11, 2019 shows moderate left CTS with active and chronic denervation      Complains of left shoulder pain radicular EMG 2019 negative for radicular changes        4.  Essential tremor (G25.0)        Essential tremor going on as far back as high school.       Anxiety disorder that exacerbates the tremor, which involves his head, speech, arms, right greater than left.         Continue propranolol 10 mg tablet 2 times per day as needed         Patient feels is helping no wheezing      Discussed multiple issues as above  Follow-up and 1 year    32 minutes total care time today        As part of visit today  Review primarily notes 8/4/2022, diabetes  Reviewed ER notes 7/5/2022, kidney stones  Reviewed ER visit 7/5/2022        Again, thank you for allowing me to participate in the care of your patient.        Sincerely,        Jairo Tamayo MD

## 2022-09-01 ENCOUNTER — TRANSFERRED RECORDS (OUTPATIENT)
Dept: HEALTH INFORMATION MANAGEMENT | Facility: CLINIC | Age: 82
End: 2022-09-01

## 2022-09-01 LAB — RETINOPATHY: NEGATIVE

## 2022-10-07 DIAGNOSIS — I10 ESSENTIAL HYPERTENSION: Primary | ICD-10-CM

## 2022-10-10 RX ORDER — SIMVASTATIN 20 MG
20 TABLET ORAL AT BEDTIME
Qty: 90 TABLET | Refills: 11 | Status: SHIPPED | OUTPATIENT
Start: 2022-10-10 | End: 2023-07-03

## 2022-10-30 ENCOUNTER — HEALTH MAINTENANCE LETTER (OUTPATIENT)
Age: 82
End: 2022-10-30

## 2022-12-05 ENCOUNTER — OFFICE VISIT (OUTPATIENT)
Dept: INTERNAL MEDICINE | Facility: CLINIC | Age: 82
End: 2022-12-05
Payer: COMMERCIAL

## 2022-12-05 VITALS
TEMPERATURE: 98.2 F | BODY MASS INDEX: 25.16 KG/M2 | SYSTOLIC BLOOD PRESSURE: 124 MMHG | HEIGHT: 65 IN | HEART RATE: 70 BPM | DIASTOLIC BLOOD PRESSURE: 62 MMHG | WEIGHT: 151 LBS

## 2022-12-05 DIAGNOSIS — N18.31 CHRONIC KIDNEY DISEASE, STAGE 3A (H): ICD-10-CM

## 2022-12-05 DIAGNOSIS — E11.8 TYPE 2 DIABETES MELLITUS WITH COMPLICATION, WITHOUT LONG-TERM CURRENT USE OF INSULIN (H): Primary | ICD-10-CM

## 2022-12-05 LAB
CHOLEST SERPL-MCNC: 155 MG/DL
CREAT UR-MCNC: 172 MG/DL
FASTING STATUS PATIENT QL REPORTED: YES
GLUCOSE SERPL-MCNC: 154 MG/DL (ref 70–99)
HBA1C MFR BLD: 6.8 % (ref 0–5.6)
HDLC SERPL-MCNC: 51 MG/DL
LDLC SERPL CALC-MCNC: 72 MG/DL
MICROALBUMIN UR-MCNC: <12 MG/L
MICROALBUMIN/CREAT UR: NORMAL MG/G{CREAT}
NONHDLC SERPL-MCNC: 104 MG/DL
TRIGL SERPL-MCNC: 158 MG/DL

## 2022-12-05 PROCEDURE — 80061 LIPID PANEL: CPT | Performed by: INTERNAL MEDICINE

## 2022-12-05 PROCEDURE — 83036 HEMOGLOBIN GLYCOSYLATED A1C: CPT | Performed by: INTERNAL MEDICINE

## 2022-12-05 PROCEDURE — 82947 ASSAY GLUCOSE BLOOD QUANT: CPT | Performed by: INTERNAL MEDICINE

## 2022-12-05 PROCEDURE — 82043 UR ALBUMIN QUANTITATIVE: CPT | Performed by: INTERNAL MEDICINE

## 2022-12-05 PROCEDURE — 99214 OFFICE O/P EST MOD 30 MIN: CPT | Performed by: INTERNAL MEDICINE

## 2022-12-05 PROCEDURE — 36415 COLL VENOUS BLD VENIPUNCTURE: CPT | Performed by: INTERNAL MEDICINE

## 2022-12-05 ASSESSMENT — PATIENT HEALTH QUESTIONNAIRE - PHQ9
SUM OF ALL RESPONSES TO PHQ QUESTIONS 1-9: 11
10. IF YOU CHECKED OFF ANY PROBLEMS, HOW DIFFICULT HAVE THESE PROBLEMS MADE IT FOR YOU TO DO YOUR WORK, TAKE CARE OF THINGS AT HOME, OR GET ALONG WITH OTHER PEOPLE: SOMEWHAT DIFFICULT
SUM OF ALL RESPONSES TO PHQ QUESTIONS 1-9: 11

## 2022-12-05 NOTE — PROGRESS NOTES
"  {PROVIDER CHARTING PREFERENCE:228010}    Gaby Carrion is a 82 year old, presenting for the following health issues:  Follow Up      History of Present Illness       Reason for visit:  Lab    He eats 0-1 servings of fruits and vegetables daily.He consumes 3 sweetened beverage(s) daily.He exercises with enough effort to increase his heart rate 30 to 60 minutes per day.  He exercises with enough effort to increase his heart rate 3 or less days per week.   He is taking medications regularly.    Today's PHQ-9         PHQ-9 Total Score: 11    PHQ-9 Q9 Thoughts of better off dead/self-harm past 2 weeks :   Not at all    How difficult have these problems made it for you to do your work, take care of things at home, or get along with other people: Somewhat difficult         Objective    /62 (BP Location: Right arm, Patient Position: Sitting, Cuff Size: Adult Regular)   Pulse 70   Temp 98.2  F (36.8  C) (Oral)   Ht 1.651 m (5' 5\")   Wt 68.5 kg (151 lb)   BMI 25.13 kg/m    Body mass index is 25.13 kg/m .  Physical Exam   {Exam List (Optional):608672}    {Diagnostic Test Results (Optional):744770}    {AMBULATORY ATTESTATION (Optional):263121}            "

## 2022-12-26 NOTE — LETTER
Letter by Mayito Carlos MD at      Author: Mayito Carlos MD Service: -- Author Type: --    Filed:  Encounter Date: 3/22/2021 Status: (Other)         Wilfrid BOWMAN Tedtori  [address]  [place] [address]             March 22, 2021         Dear Mr. Ortez,    Below are the results from your recent visit:    Resulted Orders   Glycosylated Hemoglobin A1c   Result Value Ref Range    Hemoglobin A1c 6.9 (H) <=5.6 %   Glucose   Result Value Ref Range    Glucose 134 (H) 70 - 125 mg/dL    Patient Fasting > 8hrs? Yes     Narrative    Fasting Glucose reference range is 70-99 mg/dL per  American Diabetes Association (ADA) guidelines.   Lipid Cascade   Result Value Ref Range    Cholesterol 150 <=199 mg/dL    Triglycerides 84 <=149 mg/dL    HDL Cholesterol 52 >=40 mg/dL    LDL Calculated 81 <=129 mg/dL    Patient Fasting > 8hrs? Yes    Potassium   Result Value Ref Range    Potassium 4.4 3.5 - 5.0 mmol/L       All very good results    Please call with questions or contact us using [employer].    Sincerely,        Electronically signed by Mayito Carlos MD        50% of the time/able to follow single-step instructions

## 2023-01-20 NOTE — PROGRESS NOTES
"Assessment/Plan:    Diabetes mellitus type 2 check A1c plus blood sugar lipid panel today and urine for microalbumin.    Chronic anxiety neurosis with depressive features no suicidal ideations continue high-dose paroxetine Paxil 40 mg daily.    Hypertension controlled 124/62 stable.    Chronic kidney disease.  The best way to preserve kidney function is to stay well-hydrated and to normalize blood pressure.  I did advise the patient to avoid nonsteroidal anti-inflammatory drugs available over-the-counter such as ibuprofen and naproxen sodium.    Hyperlipidemia on statin therapy no history of target organ damage related to same.    Malnutrition.  Patient needs PCA cooking cooking care for him.  This is helped him most recently and I would advise him to continue same.    25 minutes spent on the date of the encounter doing chart review, patient visit and documentation     Subjective:  Wilfrid Ortez is a 82 year old male presents for the following health issues no blood in stool or urine no chest pain or shortness of breath medication list reviewed reconciled in the chart.  Generally doing quite well.  Has a PCA type worker living with him who cooks cleans the house for him and is very supportive he needs this.  He was suffering from malnutrition which compounds his other medical conditions including and most importantly type 2 diabetes with chronic kidney disease.  I advised him to get a PCA status for his living worker.    ROS:  No blood in stool or urine denies chest pain shortness of breath medication reviewed and up-to-date and reconciled in the chart.    Objective:  /62 (BP Location: Right arm, Patient Position: Sitting, Cuff Size: Adult Regular)   Pulse 70   Temp 98.2  F (36.8  C) (Oral)   Ht 1.651 m (5' 5\")   Wt 68.5 kg (151 lb)   BMI 25.13 kg/m    Neck veins are nondistended there are no carotid bruits the back was straight and somewhat disheveled in appearance his personal hygiene questionable. "  Lungs are clear posteriorly no rales rhonchi or wheezes heart tones regular rhythm without murmur rub or gallop neck veins are nondistended no carotid bruits belly was slightly distended from adipose tissue there is no liver spleen tip palpable bowel sounds present hypoactive.  No areas of abdominal tenderness or rebound noted extremities are free of edema cyanosis or clubbing.    Mayito Carlos MD  Internal Medicine    Answers for HPI/ROS submitted by the patient on 12/5/2022  If you checked off any problems, how difficult have these problems made it for you to do your work, take care of things at home, or get along with other people?: Somewhat difficult  PHQ9 TOTAL SCORE: 11  What is the reason for your visit today? : Lab  How many servings of fruits and vegetables do you eat daily?: 0-1  On average, how many sweetened beverages do you drink each day (Examples: soda, juice, sweet tea, etc.  Do NOT count diet or artificially sweetened beverages)?: 3  How many minutes a day do you exercise enough to make your heart beat faster?: 30 to 60  How many days a week do you exercise enough to make your heart beat faster?: 3 or less  How many days per week do you miss taking your medication?: 0       No

## 2023-03-02 ENCOUNTER — TRANSFERRED RECORDS (OUTPATIENT)
Dept: HEALTH INFORMATION MANAGEMENT | Facility: CLINIC | Age: 83
End: 2023-03-02

## 2023-03-02 LAB — RETINOPATHY: NEGATIVE

## 2023-03-20 ENCOUNTER — TRANSFERRED RECORDS (OUTPATIENT)
Dept: HEALTH INFORMATION MANAGEMENT | Facility: CLINIC | Age: 83
End: 2023-03-20

## 2023-03-27 ENCOUNTER — APPOINTMENT (OUTPATIENT)
Dept: CT IMAGING | Facility: CLINIC | Age: 83
End: 2023-03-27
Attending: EMERGENCY MEDICINE
Payer: COMMERCIAL

## 2023-03-27 ENCOUNTER — APPOINTMENT (OUTPATIENT)
Dept: MRI IMAGING | Facility: CLINIC | Age: 83
End: 2023-03-27
Attending: EMERGENCY MEDICINE
Payer: COMMERCIAL

## 2023-03-27 ENCOUNTER — HOSPITAL ENCOUNTER (EMERGENCY)
Facility: CLINIC | Age: 83
Discharge: HOME | End: 2023-03-28
Attending: EMERGENCY MEDICINE | Admitting: EMERGENCY MEDICINE
Payer: COMMERCIAL

## 2023-03-27 ENCOUNTER — NURSE TRIAGE (OUTPATIENT)
Dept: NURSING | Facility: CLINIC | Age: 83
End: 2023-03-27
Payer: COMMERCIAL

## 2023-03-27 DIAGNOSIS — R42 VERTIGO: ICD-10-CM

## 2023-03-27 PROBLEM — R41.3 MEMORY IMPAIRMENT: Status: ACTIVE | Noted: 2023-03-27

## 2023-03-27 PROBLEM — G56.00 CARPAL TUNNEL SYNDROME: Status: ACTIVE | Noted: 2023-03-27

## 2023-03-27 PROBLEM — M50.10 CERVICAL DISC DISORDER WITH RADICULOPATHY: Status: ACTIVE | Noted: 2023-03-27

## 2023-03-27 LAB
ANION GAP SERPL CALCULATED.3IONS-SCNC: 7 MMOL/L (ref 5–18)
ATRIAL RATE - MUSE: 85 BPM
BUN SERPL-MCNC: 19 MG/DL (ref 8–28)
CALCIUM SERPL-MCNC: 9.2 MG/DL (ref 8.5–10.5)
CHLORIDE BLD-SCNC: 104 MMOL/L (ref 98–107)
CO2 SERPL-SCNC: 30 MMOL/L (ref 22–31)
CREAT SERPL-MCNC: 1.15 MG/DL (ref 0.7–1.3)
DIASTOLIC BLOOD PRESSURE - MUSE: NORMAL MMHG
ERYTHROCYTE [DISTWIDTH] IN BLOOD BY AUTOMATED COUNT: 13.4 % (ref 10–15)
GFR SERPL CREATININE-BSD FRML MDRD: 64 ML/MIN/1.73M2
GLUCOSE BLD-MCNC: 140 MG/DL (ref 70–125)
HCT VFR BLD AUTO: 40.8 % (ref 40–53)
HGB BLD-MCNC: 14.3 G/DL (ref 13.3–17.7)
HOLD SPECIMEN: NORMAL
INTERPRETATION ECG - MUSE: NORMAL
MCH RBC QN AUTO: 30.2 PG (ref 26.5–33)
MCHC RBC AUTO-ENTMCNC: 35 G/DL (ref 31.5–36.5)
MCV RBC AUTO: 86 FL (ref 78–100)
P AXIS - MUSE: 56 DEGREES
PLATELET # BLD AUTO: 150 10E3/UL (ref 150–450)
POTASSIUM BLD-SCNC: 3.8 MMOL/L (ref 3.5–5)
PR INTERVAL - MUSE: 174 MS
QRS DURATION - MUSE: 84 MS
QT - MUSE: 368 MS
QTC - MUSE: 437 MS
R AXIS - MUSE: 30 DEGREES
RBC # BLD AUTO: 4.73 10E6/UL (ref 4.4–5.9)
SODIUM SERPL-SCNC: 141 MMOL/L (ref 136–145)
SYSTOLIC BLOOD PRESSURE - MUSE: NORMAL MMHG
T AXIS - MUSE: 41 DEGREES
TROPONIN I SERPL-MCNC: <0.01 NG/ML (ref 0–0.29)
VENTRICULAR RATE- MUSE: 85 BPM
WBC # BLD AUTO: 5.6 10E3/UL (ref 4–11)

## 2023-03-27 PROCEDURE — 70496 CT ANGIOGRAPHY HEAD: CPT

## 2023-03-27 PROCEDURE — 85027 COMPLETE CBC AUTOMATED: CPT | Performed by: EMERGENCY MEDICINE

## 2023-03-27 PROCEDURE — 93005 ELECTROCARDIOGRAM TRACING: CPT

## 2023-03-27 PROCEDURE — 70553 MRI BRAIN STEM W/O & W/DYE: CPT

## 2023-03-27 PROCEDURE — A9585 GADOBUTROL INJECTION: HCPCS | Performed by: EMERGENCY MEDICINE

## 2023-03-27 PROCEDURE — 80048 BASIC METABOLIC PNL TOTAL CA: CPT | Performed by: EMERGENCY MEDICINE

## 2023-03-27 PROCEDURE — 250N000013 HC RX MED GY IP 250 OP 250 PS 637: Performed by: EMERGENCY MEDICINE

## 2023-03-27 PROCEDURE — 70498 CT ANGIOGRAPHY NECK: CPT

## 2023-03-27 PROCEDURE — 250N000011 HC RX IP 250 OP 636: Performed by: EMERGENCY MEDICINE

## 2023-03-27 PROCEDURE — 93005 ELECTROCARDIOGRAM TRACING: CPT | Performed by: EMERGENCY MEDICINE

## 2023-03-27 PROCEDURE — 255N000002 HC RX 255 OP 636: Performed by: EMERGENCY MEDICINE

## 2023-03-27 PROCEDURE — 84484 ASSAY OF TROPONIN QUANT: CPT | Performed by: EMERGENCY MEDICINE

## 2023-03-27 PROCEDURE — 96374 THER/PROPH/DIAG INJ IV PUSH: CPT

## 2023-03-27 PROCEDURE — 36415 COLL VENOUS BLD VENIPUNCTURE: CPT | Performed by: EMERGENCY MEDICINE

## 2023-03-27 PROCEDURE — 99285 EMERGENCY DEPT VISIT HI MDM: CPT | Mod: 25

## 2023-03-27 RX ORDER — GADOBUTROL 604.72 MG/ML
7 INJECTION INTRAVENOUS ONCE
Status: COMPLETED | OUTPATIENT
Start: 2023-03-27 | End: 2023-03-27

## 2023-03-27 RX ORDER — LORAZEPAM 2 MG/ML
0.5 INJECTION INTRAMUSCULAR ONCE
Status: COMPLETED | OUTPATIENT
Start: 2023-03-27 | End: 2023-03-27

## 2023-03-27 RX ORDER — LORAZEPAM 1 MG/1
1 TABLET ORAL ONCE
Status: COMPLETED | OUTPATIENT
Start: 2023-03-27 | End: 2023-03-27

## 2023-03-27 RX ORDER — IOPAMIDOL 755 MG/ML
75 INJECTION, SOLUTION INTRAVASCULAR ONCE
Status: COMPLETED | OUTPATIENT
Start: 2023-03-27 | End: 2023-03-27

## 2023-03-27 RX ADMIN — LORAZEPAM 1 MG: 1 TABLET ORAL at 18:36

## 2023-03-27 RX ADMIN — IOPAMIDOL 75 ML: 755 INJECTION, SOLUTION INTRAVENOUS at 19:31

## 2023-03-27 RX ADMIN — LORAZEPAM 0.5 MG: 2 INJECTION INTRAMUSCULAR; INTRAVENOUS at 21:39

## 2023-03-27 RX ADMIN — GADOBUTROL 7 ML: 604.72 INJECTION INTRAVENOUS at 23:01

## 2023-03-27 ASSESSMENT — ENCOUNTER SYMPTOMS
NECK PAIN: 0
PALPITATIONS: 0
HEADACHES: 0
NERVOUS/ANXIOUS: 1
DIZZINESS: 1
TROUBLE SWALLOWING: 0

## 2023-03-27 ASSESSMENT — ACTIVITIES OF DAILY LIVING (ADL)
ADLS_ACUITY_SCORE: 35

## 2023-03-27 NOTE — ED NOTES
"Patient keeps having moments of sudden onset anxiety where patient screams \"help me help me\" and thrashes around his bed. Patient states he can feel them coming and can't stop them.  "

## 2023-03-27 NOTE — TELEPHONE ENCOUNTER
TRIAGE CALL - 2nd Level Triage? No  Nurse Triage SBAR - Patient  calling   Situation:  dizzy since early this AM  Background:   Does havea Dignosis of vertigo and has RX to take    meclizine (ANTIVERT) 25 MG tablet, Take 1 tablet (25 mg) by mouth 3 times daily as needed for dizziness - he took his medication this AM  Had a panicked attack after - He had a person with him - who was there until he felt better.   Has not taken a medication for long time - he has not needed.  He feels something is off.  Assessment   patient coherent and alert, stated that the place was moving around again  meclizine (ANTIVERT) 25 MG tablet just took second pill.- does not seem to be working  Unstable at walking - room continues to be moving. Pt has Risk factor, diabetes and HTN  Patients denies difficulty with breathing, chest pain orWeakness or nubnees on one side of his body  Patient is able to speak in full long sentences without getting short of breath.  Measures taken: None  Pt s PCP/Clinic:   Note will be sent to PCP team.  Recommended Disposition per protocol: ER now - he will call [phone] -   Patient verbalized understanding and agrees with plan    Teagan Islas RN Nurse Triage Advisor 3:18 PM 3/27/2023    Reason for Disposition    [1] Dizziness (vertigo) present now AND [2] one or more STROKE RISK FACTORS (i.e., hypertension, diabetes, prior stroke/TIA, heart attack)  (Exception: prior physician evaluation for this AND no different/worse than usual)    Additional Information    Negative: [1] Weakness (i.e., paralysis, loss of muscle strength) of the face, arm or leg on one side of the body AND [2] sudden onset AND [3] present now    Negative: [1] Numbness (i.e., loss of sensation) of the face, arm or leg on one side of the body AND [2] sudden onset AND [3] present now    Negative: [1] Loss of speech or garbled speech AND [2] sudden onset AND [3] present now    Negative: Difficult to awaken or acting confused (e.g., disoriented,  slurred speech)    Negative: Sounds like a life-threatening emergency to the triager    Negative: Severe headache (e.g., excruciating)  (Exception: similar to previous migraines)    Negative: SEVERE dizziness (vertigo) (e.g., unable to walk without assistance)    Protocols used: DIZZINESS - VERTIGO-A-AH

## 2023-03-27 NOTE — ED TRIAGE NOTES
"Pt arrives to ED via EMS with c/o dizziness that started at 0600 this morning when he woke up. Endorses to room spinning and dizziness gets worse with movement. Hx of vertigo, pt took two meclizines with no relief. Pt also states he thinks he had a panic attack at the same thing because he was screaming and \"thrashing around\". No nausea. No other complaints.      Triage Assessment     Row Name 03/27/23 0657       Triage Assessment (Adult)    Airway WDL WDL       Respiratory WDL    Respiratory WDL WDL       Skin Circulation/Temperature WDL    Skin Circulation/Temperature WDL WDL       Cardiac WDL    Cardiac WDL WDL       Peripheral/Neurovascular WDL    Peripheral Neurovascular WDL WDL       Cognitive/Neuro/Behavioral WDL    Cognitive/Neuro/Behavioral WDL WDL              "

## 2023-03-27 NOTE — ED PROVIDER NOTES
EMERGENCY DEPARTMENT ENCOUNTER      NAME: Wilfrid Ortez  AGE: 82 year old male  YOB: 1940  MRN: [id number]  EVALUATION DATE & TIME: No admission date for patient encounter.    PCP: Mayito Carlos    ED PROVIDER: John Laguerre MD        Chief Complaint   Patient presents with     Dizziness         FINAL IMPRESSION:  1. Vertigo          ED COURSE & MEDICAL DECISION MAKING:    Pertinent Labs & Imaging studies reviewed. (See chart for details)  82 year old male presents to the Emergency Department for evaluation of abrupt onset of vertiginous symptoms that were very severe this morning when he woke up and did not respond to meclizine.  He then got a panic attack which she has a history of and sees psychiatry and he took some of his lorazepam.    I feels much better.  Still feel like he might be a little bit more unsteady on his feet.  Does have a walker at home as well as meclizine and lorazepam    Differential diagnosis includes benign proximal positional vertigo, vestibular neuritis, laryngitis, fistula, otitis media, aminoglycoside toxicity, TIA, stroke, tumor, multiple sclerosis etc.    Plan for CTA head and neck, EKG, troponin, BMP, CBC    If CTA head and neck negative plan for MRI imaging    CTA negative.  Patient has needed some lorazepam for his anxiety here.    Plan for MRI to evaluate for stroke if MRI negative plan for discharge home.  Patient is comfortable with this plan    Patient saw Dr. Kohli pending MRI, if neg plan for discharge home        ED Course as of 03/27/23 2233   Mon Mar 27, 2023   2232 Troponin I: <0.01   2232 Sodium: 141   2232 WBC: 5.6   2232 Hemoglobin: 14.3   2232 Platelet Count: 150       5:26 PM I met with the patient to gather history and to perform my initial exam. I discussed the plan for care while in the Emergency Department.  8:25 PM Checked in on and updated patient.        Medical Decision Making    History:    Supplemental history from: N/A    External  "Record(s) reviewed: Other: Prior records reviewed    Work Up:    Chart documentation includes differential considered and any EKGs or imaging interpreted by provider.    In additional to work up documented, I considered the following work up: Documented in chart, if applicable.    External consultation:    Discussion of management with another provider: Documented in chart, if applicable    Complicating factors:    Care impacted by chronic illness: Cancer/Chemotherapy    Care affected by social determinants of health: N/A    Disposition considerations: Considered admission but signed out to Dr. Bliss pending MRI    At the conclusion of the encounter I discussed the results of all of the tests and the disposition. The questions were answered. The patient or family acknowledged understanding and was agreeable with the care plan.     I wore goggles and an N95 mask during my time with the patient.     0 minutes of critical care time     MEDICATIONS GIVEN IN THE EMERGENCY:  Medications   gadobutrol (GADAVIST) injection 7 mL (has no administration in time range)   iopamidol (ISOVUE-370) solution 75 mL (75 mLs Intravenous $Given 3/27/23 1931)   LORazepam (ATIVAN) tablet 1 mg (1 mg Oral $Given 3/27/23 1836)   LORazepam (ATIVAN) injection 0.5 mg (0.5 mg Intravenous $Given 3/27/23 2139)       NEW PRESCRIPTIONS STARTED AT TODAY'S ER VISIT  New Prescriptions    No medications on file          =================================================================    HPI    Triage note  \"  Pt arrives to ED via EMS with c/o dizziness that started at 0600 this morning when he woke up. Endorses to room spinning and dizziness gets worse with movement. Hx of vertigo, pt took two meclizines with no relief. Pt also states he thinks he had a panic attack at the same thing because he was screaming and \"thrashing around\". No nausea. No other complaints.      Triage Assessment     Row Name 03/27/23 4596       Triage Assessment (Adult)    Airway " "WDL WDL       Respiratory WDL    Respiratory WDL WDL       Skin Circulation/Temperature WDL    Skin Circulation/Temperature WDL WDL       Cardiac WDL    Cardiac WDL WDL       Peripheral/Neurovascular WDL    Peripheral Neurovascular WDL WDL       Cognitive/Neuro/Behavioral WDL    Cognitive/Neuro/Behavioral WDL WDL              \"      Patient information was obtained from: Patient    Use of : N/A         Wilfrid Ortez is a 82 year old male with a pertinent history of hyperlipidemia, hypertension, CKD stage 3a, diabetes mellitus type 2, seizures, anxiety, and basal cell carcinoma of the skin who presents to this ED by EMS for evaluation of dizziness.    Patient endorses waking up around 6:00 AM with a sudden onset of dizziness. He reports it last 5 minutes, and during that time he was yelling for his friend. He states he took meclizine and then got a panic attack so he took lorazepam. He laid down to go to sleep, but everytime he would roll over he would get dizzy. Patient stated he then clicked his life alert button, and got brought here via EMS. He notes the dizziness only worsened when he moved his head, and went away when he stayed perfectly still. Currently, patient states the dizziness is resolved, and he is just anxious which he contributes to an upcoming surgery. He does note he is still unsteady which is unusual for him. He states he usually walks without his walker, but had to use it today.     Of note, this past Monday (~7 days ago), patient had a full body scan which showed basal cell carcinoma of the skin on top of his head. He reports they took a biopsy and have scheduled surgery for this. Patient endorses history of retinal detachment of the left eye. Patient reports he is s/p right hip replacement. Patient endorses seeing a psychologist Dr. Boyd and has an appointment with her tomorrow. Patient denies headache, neck pain, ear pain, ear ringing, coordination problems, difficulty " swallowing, palpitations, chest pain, or any other complaints at this time.       REVIEW OF SYSTEMS   Review of Systems   HENT: Negative for ear pain, tinnitus and trouble swallowing.    Cardiovascular: Negative for chest pain and palpitations.   Musculoskeletal: Negative for neck pain.   Neurological: Positive for dizziness (resolved). Negative for headaches.        Positive for unsteady gait   Psychiatric/Behavioral: The patient is nervous/anxious.         PAST MEDICAL HISTORY:  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      Sleep apnea     CPAP, hasn't used in a year       PAST SURGICAL HISTORY:  Past Surgical History:   Procedure Laterality Date     TRANSRECTAL ULTRASONIC, TRANSURETHRAL RESECTION (TUR) OF PROSTATE CYST       ZZC TOTAL HIP ARTHROPLASTY Right 5/4/2021    Procedure: RIGHT TOTAL HIP ARTHROPLASTY;  Surgeon: Frandy Clinton MD;  Location: [facility];  Service: Orthopedics           CURRENT MEDICATIONS:    acetaminophen (TYLENOL) 500 MG tablet  aspirin (ASA) 325 MG EC tablet  latanoprost (XALATAN) 0.005 % ophthalmic solution  LORazepam (ATIVAN) 0.5 MG tablet  meclizine (ANTIVERT) 25 MG tablet  omeprazole (PRILOSEC) 20 MG DR capsule  PARoxetine (PAXIL) 10 MG tablet  PARoxetine (PAXIL) 40 MG tablet  propranolol (INDERAL) 10 MG tablet  simvastatin (ZOCOR) 20 MG tablet  UNABLE TO FIND        ALLERGIES:  Allergies   Allergen Reactions     Ibuprofen      nausea       FAMILY HISTORY:  Family History   Problem Relation Age of Onset     Diabetes Brother      Throat cancer Mother      Myocardial Infarction Father      Alcoholism Brother      Cancer  Mother         throat     Diabetes Mother      Heart Disease Father        SOCIAL HISTORY:   Social History     Socioeconomic History     Marital status: Single   Tobacco Use     Smoking status: Never     Smokeless tobacco: Never   Substance and Sexual Activity     Alcohol use: Not Currently     Alcohol/week: 1.0 standard drink     Comment: Alcoholic Drinks/day: rare     Drug use: No       VITALS:  BP (!) 170/89   Pulse 89   Temp 97.6  F (36.4  C) (Temporal)   Resp 18   SpO2 97%     PHYSICAL EXAM    Vitals: BP (!) 170/89   Pulse 89   Temp 97.6  F (36.4  C) (Temporal)   Resp 18   SpO2 97%   General: Appears in no acute distress, awake, alert, interactive, wobbly, unsteady gait.  Eyes: Conjunctivae non-injected. Sclera anicteric.  HENT: Atraumatic.  Neck: Supple.  Respiratory/Chest: Respiration unlabored.  Abdomen: non distended  Musculoskeletal: Normal extremities. No edema or erythema.  Skin: Normal color. No rash or diaphoresis.  Neurologic: Face symmetric, moves all extremities spontaneously. Speech clear.  Slightly unsteady gait but does have a walker at home. NIH 0  Psychiatric: Oriented to person, place, and time. Affect appropriate.    National Institutes of Health Stroke Scale  Exam Interval: Around 12 hours since first onset of symptoms   Score    Level of consciousness: (0)   Alert, keenly responsive    LOC questions: (0)   Answers both questions correctly    LOC commands: (0)   Performs both tasks correctly    Best gaze: (0)   Normal    Visual: (0)   No visual loss    Facial palsy: (0)   Normal symmetrical movements    Motor arm (left): (0)   No drift    Motor arm (right): (0)   No drift    Motor leg (left): (0)   No drift    Motor leg (right): (0)   No drift    Limb ataxia: (0)   Absent    Sensory: (0)   Normal- no sensory loss    Best language: (0)   Normal- no aphasia    Dysarthria: (0)   Normal    Extinction and inattention: (0)   No abnormality        Total Score:  0       LAB:  All pertinent  labs reviewed and interpreted.  Results for orders placed or performed during the hospital encounter of 03/27/23   CTA Head Neck with Contrast    Impression    IMPRESSION:   HEAD CT:  No acute intracranial process.    HEAD CTA:  Normal CTA [place].    NECK CTA:  Normal neck CTA.     CBC (+ platelets, no diff)   Result Value Ref Range    WBC Count 5.6 4.0 - 11.0 10e3/uL    RBC Count 4.73 4.40 - 5.90 10e6/uL    Hemoglobin 14.3 13.3 - 17.7 g/dL    Hematocrit 40.8 40.0 - 53.0 %    MCV 86 78 - 100 fL    MCH 30.2 26.5 - 33.0 pg    MCHC 35.0 31.5 - 36.5 g/dL    RDW 13.4 10.0 - 15.0 %    Platelet Count 150 150 - 450 10e3/uL   Result Value Ref Range    Troponin I <0.01 0.00 - 0.29 ng/mL   Basic metabolic panel   Result Value Ref Range    Sodium 141 136 - 145 mmol/L    Potassium 3.8 3.5 - 5.0 mmol/L    Chloride 104 98 - 107 mmol/L    Carbon Dioxide (CO2) 30 22 - 31 mmol/L    Anion Gap 7 5 - 18 mmol/L    Urea Nitrogen 19 8 - 28 mg/dL    Creatinine 1.15 0.70 - 1.30 mg/dL    Calcium 9.2 8.5 - 10.5 mg/dL    Glucose 140 (H) 70 - 125 mg/dL    GFR Estimate 64 >60 mL/min/1.73m2   Extra Blue Top Tube   Result Value Ref Range    Hold Specimen JIC    ECG 12-LEAD WITH MUSE (LHE)   Result Value Ref Range    Systolic Blood Pressure  mmHg    Diastolic Blood Pressure  mmHg    Ventricular Rate 85 BPM    Atrial Rate 85 BPM    MI Interval 174 ms    QRS Duration 84 ms     ms    QTc 437 ms    P Axis 56 degrees    R AXIS 30 degrees    T Axis 41 degrees    Interpretation ECG       Sinus rhythm  Normal ECG  When compared with ECG of 28-OCT-2021 07:58,  No significant change was found  Confirmed by SEE ED PROVIDER NOTE FOR, ECG INTERPRETATION (4000),  NABIL HIGUERA ([id number]) on 3/27/2023 6:25:24 PM         RADIOLOGY:  Reviewed all pertinent imaging. Please see official radiology report.  CTA Head Neck with Contrast   Final Result   IMPRESSION:    HEAD CT:   No acute intracranial process.      HEAD CTA:   Normal CTA [place].      NECK CTA:   Normal neck CTA.         MR Brain w/o & w Contrast    (Results Pending)       EKG:    Performed at: 17:37    Impression: Sinus rhythm    Rate: 85 BPM  Rhythm: Sinus  Axis: 30  VA Interval: 174 ms  QRS Interval: 84 ms  QTc Interval: 437 ms  ST Changes: None  Comparison: When compared with ECG of 28-OCT-2021, no significant change was found     I have independently reviewed and interpreted the EKG(s) documented above.    PROCEDURES:   None      I, Kay Ferris, am serving as a scribe to document services personally performed by Yaron Laguerre MD based on my observation and the provider's statements to me. I, Dr. Yaron Laguerre, attest that Kay Ferris is acting in a scribe capacity, has observed my performance of the services and has documented them in accordance with my direction.    Yaron Laguerre MD  Emergency Medicine  [facility] EMERGENCY ROOM  [facility] [facility] [id number] [phone]     Yaron Laguerre MD  03/27/23 1806

## 2023-03-28 VITALS
DIASTOLIC BLOOD PRESSURE: 77 MMHG | RESPIRATION RATE: 18 BRPM | SYSTOLIC BLOOD PRESSURE: 148 MMHG | TEMPERATURE: 97.6 F | HEART RATE: 92 BPM | OXYGEN SATURATION: 96 %

## 2023-03-28 ASSESSMENT — ACTIVITIES OF DAILY LIVING (ADL): ADLS_ACUITY_SCORE: 35

## 2023-03-28 NOTE — DISCHARGE INSTRUCTIONS
Continue your meclizine as needed for dizziness  Return to the emergency department for worsening problems or concerns  Use a walker to help you ambulate safely

## 2023-03-28 NOTE — ED NOTES
EMERGENCY DEPARTMENT SIGN OUT NOTE        ED COURSE AND MEDICAL DECISION MAKING  Patient was signed out to me by Dr John Laguerre at 10:30 PM.  2330-MRI returns normal.  Advised patient of MRI findings.    In brief, Wilfrid Ortez is a 82 year old male who initially presented dizziness. The patient reported waking up around 0600 with sudden onset of dizziness.     At time of sign out, disposition was pending MRI result.    FINAL IMPRESSION    1. Vertigo        ED MEDS  Medications   iopamidol (ISOVUE-370) solution 75 mL (75 mLs Intravenous $Given 3/27/23 1931)   LORazepam (ATIVAN) tablet 1 mg (1 mg Oral $Given 3/27/23 1836)   LORazepam (ATIVAN) injection 0.5 mg (0.5 mg Intravenous $Given 3/27/23 2139)   gadobutrol (GADAVIST) injection 7 mL (7 mLs Intravenous $Given 3/27/23 2301)       LAB  Labs Ordered and Resulted from Time of ED Arrival to Time of ED Departure   BASIC METABOLIC PANEL - Abnormal       Result Value    Sodium 141      Potassium 3.8      Chloride 104      Carbon Dioxide (CO2) 30      Anion Gap 7      Urea Nitrogen 19      Creatinine 1.15      Calcium 9.2      Glucose 140 (*)     GFR Estimate 64     CBC WITH PLATELETS - Normal    WBC Count 5.6      RBC Count 4.73      Hemoglobin 14.3      Hematocrit 40.8      MCV 86      MCH 30.2      MCHC 35.0      RDW 13.4      Platelet Count 150     TROPONIN I - Normal    Troponin I <0.01         RADIOLOGY    MR Brain w/o & w Contrast   Final Result   IMPRESSION:   1.  No acute findings. Age-related changes.         CTA Head Neck with Contrast   Final Result   IMPRESSION:    HEAD CT:   No acute intracranial process.      HEAD CTA:   Normal CTA [place].      NECK CTA:   Normal neck CTA.               I, Ronald Navas, am serving as a scribe to document services personally performed by Annita Mariee MD based on my observations and the provider's statements to me.  I, Annita Mariee MD, attest that Ronald Navas is acting in a scribe  capacity, has observed my performance of the services and has documented them in accordance with my direction.    Annita Mariee MD  [facility] EMERGENCY ROOM  [address] [facility] [id number] [phone]     Annita Mariee MD  03/27/23 1105

## 2023-04-02 ENCOUNTER — NURSE TRIAGE (OUTPATIENT)
Dept: NURSING | Facility: CLINIC | Age: 83
End: 2023-04-02
Payer: COMMERCIAL

## 2023-04-03 ENCOUNTER — OFFICE VISIT (OUTPATIENT)
Dept: FAMILY MEDICINE | Facility: CLINIC | Age: 83
End: 2023-04-03
Payer: COMMERCIAL

## 2023-04-03 VITALS
BODY MASS INDEX: 25.16 KG/M2 | RESPIRATION RATE: 19 BRPM | OXYGEN SATURATION: 94 % | DIASTOLIC BLOOD PRESSURE: 96 MMHG | TEMPERATURE: 98.1 F | HEIGHT: 65 IN | WEIGHT: 151 LBS | HEART RATE: 64 BPM | SYSTOLIC BLOOD PRESSURE: 174 MMHG

## 2023-04-03 DIAGNOSIS — H81.10 BENIGN PAROXYSMAL POSITIONAL VERTIGO, UNSPECIFIED LATERALITY: Primary | ICD-10-CM

## 2023-04-03 DIAGNOSIS — R03.0 ELEVATED BLOOD PRESSURE READING WITHOUT DIAGNOSIS OF HYPERTENSION: ICD-10-CM

## 2023-04-03 PROCEDURE — 99213 OFFICE O/P EST LOW 20 MIN: CPT | Performed by: FAMILY MEDICINE

## 2023-04-03 RX ORDER — PROCHLORPERAZINE MALEATE 5 MG
5 TABLET ORAL EVERY 6 HOURS PRN
Qty: 30 TABLET | Refills: 0 | Status: SHIPPED | OUTPATIENT
Start: 2023-04-03 | End: 2023-07-03

## 2023-04-03 ASSESSMENT — PAIN SCALES - GENERAL: PAINLEVEL: NO PAIN (0)

## 2023-04-03 ASSESSMENT — ENCOUNTER SYMPTOMS
LIGHT-HEADEDNESS: 1
VOMITING: 0
DIZZINESS: 1
NAUSEA: 0
CONSTITUTIONAL NEGATIVE: 1

## 2023-04-03 ASSESSMENT — PATIENT HEALTH QUESTIONNAIRE - PHQ9
10. IF YOU CHECKED OFF ANY PROBLEMS, HOW DIFFICULT HAVE THESE PROBLEMS MADE IT FOR YOU TO DO YOUR WORK, TAKE CARE OF THINGS AT HOME, OR GET ALONG WITH OTHER PEOPLE: SOMEWHAT DIFFICULT
SUM OF ALL RESPONSES TO PHQ QUESTIONS 1-9: 7
SUM OF ALL RESPONSES TO PHQ QUESTIONS 1-9: 7

## 2023-04-03 NOTE — TELEPHONE ENCOUNTER
"Wilfrid has been experiencing increased, recurrent episodes of Vertigo this past week. He has a past history of Vertigo    He was seen in an ER twice this week  - 3/27 @ [place] & 3/29 @ [place]    He was taken via ambulance on 3/27/23. Wilfrid states that his Blood Pressure was very high both in the ambulance & at the hospital.    Today, prior to calling, he had another episode of Vertigo when he was in the bathroom.   He did NOT fall but he \"knelt\" down on his knees.   Another person was present in the home to help him get up.    When he was seen in the ER, he was given Lorazepam. He found that it helped his Vertigo dizziness. He wonders:  - Could his Vertigo actually be a Panic attack?  - Could he take a higher dose of Lorazepam, at home, to treat his symptoms?    He has a history of Panic attacks. He has Lorazepam 0.5 mg prescribed by his Psychiatric provider.  His Psychiatric provider also advised decreasing Paroxetine dose from 50 mg down to 40 mg - due to possible dizziness side effect    Advised to see PCP within 24 hours  Care Advice reviewed  Suggested possible visit with an ENT provider    Transferred to Pola RN  [facility]      Reason for Disposition    [1] NO dizziness now AND [2] one or more stroke risk factors (i.e., hypertension, diabetes, prior stroke/TIA/heart attack)    Additional Information    Negative: [1] Weakness (i.e., paralysis, loss of muscle strength) of the face, arm or leg on one side of the body AND [2] sudden onset AND [3] present now    Negative: [1] Numbness (i.e., loss of sensation) of the face, arm or leg on one side of the body AND [2] sudden onset AND [3] present now    Negative: [1] Loss of speech or garbled speech AND [2] sudden onset AND [3] present now    Negative: Difficult to awaken or acting confused (e.g., disoriented, slurred speech)    Negative: Sounds like a life-threatening emergency to the triager    " Negative: SEVERE dizziness (vertigo) (e.g., unable to walk without assistance)    Negative: Severe headache (e.g., excruciating)  (Exception: similar to previous migraines)    Negative: [1] Dizziness (vertigo) present now AND [2] one or more STROKE RISK FACTORS (i.e., hypertension, diabetes, prior stroke/TIA, heart attack)  (Exception: prior physician evaluation for this AND no different/worse than usual)    Negative: [1] Dizziness (vertigo) present now AND [2] age > 59  (Exception: prior physician evaluation for this AND no different/worse than usual)    Negative: [1] Weakness (i.e., paralysis, loss of muscle strength) of the face, arm / hand, or leg / foot on one side of the body AND [2] sudden onset AND [3] brief (now gone)    Negative: [1] Numbness (i.e., loss of sensation) of the face, arm / hand, or leg / foot on one side of the body AND [2] sudden onset AND [3] brief (now gone)    Negative: [1] Loss of speech or garbled speech AND [2] sudden onset AND [3] brief (now gone)    Negative: Loss of vision or double vision (Exception: similar to previous migraines)    Negative: Patient sounds very sick or weak to the triager    Negative: Taking a medicine that could cause dizziness (e.g., phenytoin [Dilantin], carbamazepine [Tegretol], primidone [Mysoline])    Negative: [1] MODERATE dizziness (e.g., vertigo; feels very unsteady, interferes with normal activities) AND [2] has NOT been evaluated by physician for this    Protocols used: DIZZINESS - VERTIGO-A-

## 2023-04-03 NOTE — PROGRESS NOTES
"  Assessment and Plan    (H81.10) Benign paroxysmal positional vertigo, unspecified laterality  (primary encounter diagnosis)  Comment: will refer to PT, trial of new med for nausea/dizziness  Plan: Physical Therapy Referral, prochlorperazine         (COMPAZINE) 5 MG tablet            (R03.0) Elevated blood pressure reading without diagnosis of hypertension  Comment: increas propranolol to 20mg BID  Plan: has appt with PCP in about a1w      RTC in 1w    Michi Kunz MD        Gaby Carrion is a 82 year old, presenting for the following health issues:  ER F/U        4/3/2023     8:44 AM   Additional Questions   Roomed by Janelle Vel[facility]quez [facility]         4/3/2023     8:44 AM   Patient Reported Additional Medications   Patient reports taking the following new medications Meclizine     HPI     ED/UC Followup:    Facility:  [facility] ED  Date of visit: 3/29/2023  Reason for visit: Vertigo  Current Status: Not feeling better. Notes the vertigo is the same, medication is not helping. Had a 'spell' in the parking o    Fell in the parking lot today getting out of his car.  Can't move his head quickly without triggering this.  Meclizine no helpful. Does also follow with neuro for tremor.    Uses propranolol to help with anxiety, took this about 0730.  Does see pydavid and they thought his paroxetine might be triggering this and dropped from 40 to 50 mg and has not seen any change.      Denies CP, palpitations, edema, dyspnea, HA, vision changes.    Review of Systems   Constitutional: Negative.    Gastrointestinal: Negative for nausea and vomiting.   Neurological: Positive for dizziness and light-headedness.            Objective    BP (!) 174/96 (BP Location: Right arm, Patient Position: Sitting, Cuff Size: Adult Regular)   Pulse 64   Temp 98.1  F (36.7  C) (Oral)   Resp 19   Ht 1.651 m (5' 5\")   Wt 68.5 kg (151 lb)   SpO2 94%   BMI 25.13 kg/m    Body mass index is 25.13 kg/m .  Physical Exam  Vitals and nursing note " reviewed.   HENT:      Head: Normocephalic.   Eyes:      Conjunctiva/sclera: Conjunctivae normal.   Cardiovascular:      Rate and Rhythm: Normal rate and regular rhythm.      Heart sounds: Normal heart sounds.   Pulmonary:      Effort: Pulmonary effort is normal.      Breath sounds: Normal breath sounds.   Skin:     General: Skin is warm and dry.   Neurological:      General: No focal deficit present.      Mental Status: He is alert and oriented to person, place, and time.   Psychiatric:         Mood and Affect: Mood is anxious.         Behavior: Behavior normal.                            Answers for HPI/ROS submitted by the patient on 4/3/2023  If you checked off any problems, how difficult have these problems made it for you to do your work, take care of things at home, or get along with other people?: Somewhat difficult  PHQ9 TOTAL SCORE: 7

## 2023-04-06 ENCOUNTER — VIRTUAL VISIT (OUTPATIENT)
Dept: INTERNAL MEDICINE | Facility: CLINIC | Age: 83
End: 2023-04-06
Payer: COMMERCIAL

## 2023-04-06 DIAGNOSIS — H83.09 LABYRINTHITIS, UNSPECIFIED LATERALITY: Primary | ICD-10-CM

## 2023-04-06 PROCEDURE — 99212 OFFICE O/P EST SF 10 MIN: CPT | Mod: 93 | Performed by: INTERNAL MEDICINE

## 2023-04-06 RX ORDER — DIAZEPAM 5 MG
TABLET ORAL
Qty: 10 TABLET | Refills: 1 | Status: SHIPPED | OUTPATIENT
Start: 2023-04-06 | End: 2023-07-03

## 2023-04-06 NOTE — PROGRESS NOTES
Wilfrid Ortez is a 82 year oldwho is being evaluated via a billable telephone visit.       What phone number would you like to be contacted at? [phone]      Assessment & Plan  Acute labyrinthitis.  Not better after lorazepam and meclizine and Epley maneuvers.  Previously seen by otolaryngologist in [place].  A Dr. Wells.  [facility]  Discussed Epley maneuvers.  Internet access will help learn about these Epley exercises and maneuvers for the patient to do at home.  In the meantime as he is still severely symptomatic.  With nausea vomiting and dizziness we will try sublingual Valium.  Has had imaging studies after 2 visits to the emergency room as well.  Reviewed results.     11 minutes spent on the date of the encounter doing chart review, patient visit and documentation  and I spoke with the patient on the phone directly 5 minutes.       Subjective   History as above.     Review of Systems   No blood in stool or urine no chest pain shortness of breath medication list reviewed reconciled.       Mayito Carlos MD

## 2023-04-10 ENCOUNTER — NURSE TRIAGE (OUTPATIENT)
Dept: NURSING | Facility: CLINIC | Age: 83
End: 2023-04-10
Payer: COMMERCIAL

## 2023-04-10 NOTE — TELEPHONE ENCOUNTER
Nurse Triage SBAR    Is this a 2nd Level Triage? YES, LICENSED PRACTITIONER REVIEW IS REQUIRED    Situation: Patient calling for possible confusion, secondary to new medication.    Background: Patient reporting that he 'just doesn't feel like he's there'. Reporting recent script for Valium. States he has taken 6 since script was written. Concerned that he is over medicated. He reported that he hasn't taken any of his medications today. He did not appear to be confused during this call.    Assessment: Possible side effects from newly prescribed medication.    Protocol Recommended Disposition:   Callback by PCP Today    Recommendation: Recommended patient take his usual medications and hold the valium until advised by PCP/care team. advised the messaging would be sent to PCP.     Routed to provider    Does the patient meet one of the following criteria for ADS visit consideration? 16+ years old, with an [facility] PCP     TIP  Providers, please consider if this condition is appropriate for management at one of our Acute and Diagnostic Services sites.     If patient is a good candidate, please use dotphrase <dot>triageresponse and select Refer to ADS to document.     Reason for Disposition    Caller has NON-URGENT medicine question about med that PCP or specialist prescribed and triager unable to answer question    Additional Information    Negative: Prescription not at pharmacy and was prescribed by PCP recently  (Exception: triager has access to EMR and prescription is recorded there. Go to Home Care and confirm for pharmacy.)    Negative: Pharmacy calling with prescription question and triager unable to answer question    Negative: Caller has URGENT medicine question about med that PCP or specialist prescribed and triager unable to answer question    Negative: Caller has medication question about med NOT prescribed by PCP and triager unable to answer question (e.g., compatibility with other med, storage)    Negative:  DOUBLE DOSE (an extra dose or lesser amount) of prescription drug and NO symptoms  (Exception: A double dose of antibiotics.)    Negative: Diabetes drug error or overdose (e.g., took wrong type of insulin or took extra dose)    Negative: MORE THAN A DOUBLE DOSE of a prescription or over-the-counter (OTC) drug    Negative: DOUBLE DOSE (an extra dose or lesser amount) of prescription drug and any symptoms (e.g., dizziness, nausea, pain, sleepiness)    Negative: DOUBLE DOSE (an extra dose or lesser amount) of over-the-counter (OTC) drug and any symptoms (e.g., dizziness, nausea, pain, sleepiness)    Negative: Took another person's prescription drug    Negative: Drug overdose and triager unable to answer question    Negative: Caller requesting a renewal or refill of a medicine patient is currently taking    Negative: Caller requesting information unrelated to medicine    Negative: Caller requesting information about COVID-19 Vaccine    Negative: Caller requesting information about Emergency Contraception    Negative: Caller requesting information about Combined Birth Control Pills    Negative: Caller requesting information about Progestin Birth Control Pills    Negative: Caller requesting information about Post-Op pain or medicines    Negative: Caller requesting a prescription antibiotic (such as penicillin) for Strep throat and has a positive culture result    Negative: Caller requesting a prescription anti-viral med (such as Tamiflu) and has influenza (flu) symptoms    Negative: Immunization reaction suspected    Negative: Rash while taking a medicine or within 3 days of stopping it    Negative: Asthma and having symptoms of asthma (cough, wheezing, etc.)    Negative: Symptom of illness (e.g., headache, abdominal pain, earache, vomiting) that are more than mild    Negative: Breastfeeding questions about mother's medicines and diet    Negative: Intentional drug overdose and suicidal thoughts or ideas    Protocols used:  MEDICATION QUESTION CALL-A-[place]    Malvin Tyson, RN, BSN, MSN  [facility] Triage 3:28 PM

## 2023-04-11 NOTE — TELEPHONE ENCOUNTER
Contacted patient and reviewed message below from PCP.  Patient has not taken the medication since Sunday evening.  He reports feeling better, no confusion today.  Provided PT scheduling number as he has not been contacted regarding his referral for BPPV.  He will also use meclizine and or compazine which was recently prescribed.  To call back with further questions or concerns.

## 2023-04-11 NOTE — TELEPHONE ENCOUNTER
Mayito Carlos MD Timm, Mark J RN 16 hours ago (3:37 PM)     MB  Stop the Valium now and not to take any for the next 72hrs and please call pt for me. BRAIN

## 2023-04-13 ENCOUNTER — HOSPITAL ENCOUNTER (OUTPATIENT)
Dept: PHYSICAL THERAPY | Facility: CLINIC | Age: 83
Discharge: HOME | End: 2023-04-13
Attending: FAMILY MEDICINE
Payer: COMMERCIAL

## 2023-04-13 DIAGNOSIS — R26.89 IMBALANCE: ICD-10-CM

## 2023-04-13 DIAGNOSIS — H81.10 BENIGN PAROXYSMAL POSITIONAL VERTIGO, UNSPECIFIED LATERALITY: ICD-10-CM

## 2023-04-13 DIAGNOSIS — R42 DIZZINESS: Primary | ICD-10-CM

## 2023-04-13 DIAGNOSIS — G25.0 BENIGN ESSENTIAL TREMOR: ICD-10-CM

## 2023-04-13 PROCEDURE — 97162 PT EVAL MOD COMPLEX 30 MIN: CPT | Mod: GP

## 2023-04-13 PROCEDURE — 97112 NEUROMUSCULAR REEDUCATION: CPT | Mod: GP

## 2023-04-13 NOTE — PROGRESS NOTES
[facility]                                                                                   OUTPATIENT PHYSICAL THERAPY FUNCTIONAL EVALUATION  PLAN OF TREATMENT FOR OUTPATIENT REHABILITATION  (COMPLETE FOR INITIAL CLAIMS ONLY)  Patient's Last Name, First Name, M.I.  YOB: 1940  Wilfrid Ortez     Provider's Name   [facility]   Medical Record No.  [id number]     Start of Care Date:  04/13/23   Onset Date:  03/27/23   Type:     _X__PT   ____OT  ____SLP Medical Diagnosis:  BPPV, unspecified laterality     PT Diagnosis:  dizziness with head/body movement; imbalance Visits from SOC:  1                              __________________________________________________________________________________  Plan of Treatment/Functional Goals:  balance training, gait training, neuromuscular re-education, ROM, strengthening, stretching, other (see comments)  vestibular rehabilitation; canalith repositioning        GOALS  Subjective Dizziness  Pt will decrease self-reported dizziness to </=0-2/10 with completion of daily activities and/or vestibular rehabilitation/BPPV exercise program.  06/11/23    Positional Testing  Pt will complete BPPV positional testing without observed nystagmus and/or increase of dizziness.  06/11/23    Therapy Frequency:  1 time/week   Predicted Duration of Therapy Intervention:  60 days    Katie Snell, PT, DPT                                    I CERTIFY THE NEED FOR THESE SERVICES FURNISHED UNDER        THIS PLAN OF TREATMENT AND WHILE UNDER MY CARE     (Physician co-signature of this document indicates review and certification of the therapy plan).                Certification Date From:  04/13/23   Certification Date To:  06/11/23    Referring Provider:  Michi Kunz MD    Initial Assessment  See Epic Evaluation- Start of Care Date:  04/13/23

## 2023-04-13 NOTE — TELEPHONE ENCOUNTER
Refill request for: propranolol 10mg   Directions: Take 1 tablet (10 mg) by mouth 2 times daily     Change in pharmacy request.    LOV: 08/30/22  NOV: 08/30/23    90 day supply with 1 refills Medication T'd for review and signature      Ariadna Vincent LPN on 4/13/2023 at 12:49 PM

## 2023-04-13 NOTE — PROGRESS NOTES
04/13/23 1100   Quick Adds   Quick Adds Certification;Vestibular Eval   Type of Visit Initial OP PT Evaluation   General Information   Start of Care Date 04/13/23   Referring Physician Michi Kunz MD   Orders Evaluate and Treat as Indicated   Order Date 04/03/23   Medical Diagnosis Benign paroxysmal positional vertigo, unspecified laterality   Onset of illness/injury or Date of Surgery 03/27/23   Precautions/Limitations no known precautions/limitations   Surgical/Medical history reviewed Yes   Pertinent history of current vestibular problem (include personal factors and/or comorbidities that impact the POC)  Anxiety  (Panic Attacks)   Pertinent history of current problem (include personal factors and/or comorbidities that impact the POC) Pt is an 81 y/o M, presenting to PT for evaluation of dizziness on 4/13/2023. He reports he had a friend drive him today - he's concered with the dizziness and turning head for driving. He was previously having a friend stay with him; however, is now feeling confident alone in his apartment. He's unsure if his dizziness is positional or related to this panic attacks. He reports at one time - he'd have a panic attack everytime he lifts his hands overhead. He takes lorazepam to manage. He reports his initial dizziness began on 3/27/2023, for which he presented to the ED, underwent a full work-up (negative for CVA and/or heart pathology), and was given lorazepam and was discharged home. His symptoms included room-spinning dizziness upon waking up, no relief with x2 doses of Mecalzine, and onset of a panic attack following the dizziness. He then presented again to ED on 3/29/2023, due to reported worsening of the room-spinning dizziness, dizziness with quick eye movement, and closing his eyes. He was very anxious/felt like he was having a panic attack while at the hopsital. He was given IV fluids, meclizine, and lorazepam; symptoms resolved - he was discharged home.     PMHx: Lyme disease, panic attacks, stage III chronic kidney disease, type II diabetes, hyperlipidemia, and hypertension.   Prior level of function comment Pt IND with transfers/ambulation.   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 Has neighbors to help around him as needed.   Assistive Devices Comments Life alert.   Patient/Family Goals Statement To eliminate his dizziness.   Fall Risk Screen   Fall screen completed by PT   Have you fallen 2 or more times in the past year? Yes   Have you fallen and had an injury in the past year? Yes   Is patient a fall risk? Yes   Fall screen comments Pt at inc risk for falling 2' subjective report of severe dizziness and his onset of symptoms with quick and/or turning movements.   Pain   Patient currently in pain No   Cognitive Status Examination   Orientation orientation to person, place and time   Cognitive Comment WFL; pt with anxiety and panic symptoms surrounding movement.   Posture   Posture Comments Pt with forward head and thoracic kyphosis.   Range of Motion (ROM)   ROM Comment Pt with limited cervical ROM 2' fear of onset of dizziness.   Strength   Strength Comments Pt with functional B UE/LE strength - observed with mobility during PT session. Determined >4-/5.   Bed Mobility   Bed Mobility Comments Pt with inability to lie down on his R/L sides and/or supine 2' fear of dizziness.   Gait   Gait Comments Pt with dec velocity of gait; likely inc risk for falling 2' slowed motion.   Balance   Balance Comments Expecting poor static/dynamic balance 2' poor sensory integration and pt's hx of falling.   Sensory  Examination   Sensory Perception Comments Reporting no changes from baseline.   Cervicogenic Screen   Neck ROM Pt with limited cervical ROM 2' fear of onset of dizziness.   Vertebral Artery Test Other   Vertebral Artery Test Comments   (Not completed 2' pt's fear of head movement. Performed only pt's self-selected motion during evaluation.)   Oculomotor Exam   Smooth Pursuit Normal   Saccades Normal   VOR Normal   VOR Cancellation Normal   Rapid Head Thrust Other   Rapid Head Thrust Comments Unable to complete 2' pt's muscle guarding.   Convergence Testing Normal   Convergence Testing Comments NPC=4cm   Other Oculomotor Exam Comments Pt asymptomatic with entirety of exam.   Infrared Goggle Exam or Frenzel Lense Exam   Vestibular Suppressant in Last 24 Hours? No   Exam completed with Infrared Goggles   Spontaneous Nystagmus Negative   Gaze Evoked Nystagmus Negative   Head Shake Horizontal Nystagmus Negative   Positional testing Other   Positional Testing comments Unable to accurately complete 2' pt's fear of movement. Reached a partial R/L sidelying and partial supine position - pt held each position for at most 5 seconds. Significantly limited by anxiety and slight onset of dizziness feeling.   Other Infrared Goggle Exam or Frenzel Lense Exam Comments Inc time required for the activity 2' pt's significant fear avoidance of head/body positional changes.   Dynamic Visual Acuity (DVA)   DVA Comments Not completed during PT evaluation; will perform as able.   Planned Therapy Interventions   Planned Therapy Interventions balance training;gait training;neuromuscular re-education;ROM;strengthening;stretching;other (see comments)   Planned Therapy Interventions Comment vestibular rehabilitation; canalith repositioning   Clinical Impression   Criteria for Skilled Therapeutic Interventions Met yes, treatment indicated   PT Diagnosis dizziness with head/body movement; imbalance   Influenced by the following impairments  dizziness with functitonal mobility; static/dynamic balance impairment; gait deficits; panic attacks   Functional limitations due to impairments inc risk for falling with household/community activities   Clinical Presentation Evolving/Changing   Clinical Presentation Rationale Pt with anxiety/panic attacks limiting tolerance to physical mobility.   Clinical Decision Making (Complexity) Moderate complexity   Therapy Frequency 1 time/week   Predicted Duration of Therapy Intervention (days/wks) 60 days   Risk & Benefits of therapy have been explained Yes   Patient, Family & other staff in agreement with plan of care Yes   Clinical Impression Comments Unable to determine whether pt presenting with BPPV and/or anxiety-related dizziness, 2' pt's hesitation and freezing with movement that could potentially bring on dizziness symptoms. However, his reported symptoms with position changes and the finding of a typical oculomotor examination do likely lead to dx of BPPV. Planning for pt to trial taking his anxiety/panic attack medication prior to his next PT session - then completing a f/u positional assessment. He'll benefit from skilled PT intervention to improve his dizziness, safety with mobility, and return to PLOF.   GOALS   PT Eval Goals 1;2   Goal 1   Goal Identifier Subjective Dizziness   Goal Description Pt will decrease self-reported dizziness to </=0-2/10 with completion of daily activities and/or vestibular rehabilitation/BPPV exercise program.   Target Date 06/11/23   Goal 2   Goal Identifier Positional Testing   Goal Description Pt will complete BPPV positional testing without observed nystagmus and/or increase of dizziness.   Target Date 06/11/23   Total Evaluation Time   PT Eval, Moderate Complexity Minutes (06985) 35   Therapy Certification   Certification date from 04/13/23   Certification date to 06/11/23   Medical Diagnosis BPPV, unspecified laterality   Certification I certify the need for these services  furnished under this plan of treatment and while under my care.  (Physician co-signature of this document indicates review and certification of the therapy plan).

## 2023-04-14 RX ORDER — PROPRANOLOL HYDROCHLORIDE 10 MG/1
10 TABLET ORAL 2 TIMES DAILY
Qty: 180 TABLET | Refills: 1 | Status: SHIPPED | OUTPATIENT
Start: 2023-04-14 | End: 2023-04-25

## 2023-04-19 DIAGNOSIS — I10 ESSENTIAL HYPERTENSION: ICD-10-CM

## 2023-04-19 DIAGNOSIS — Z00.00 ROUTINE GENERAL MEDICAL EXAMINATION AT A HEALTH CARE FACILITY: ICD-10-CM

## 2023-04-20 RX ORDER — MECLIZINE HYDROCHLORIDE 25 MG/1
25 TABLET ORAL 3 TIMES DAILY PRN
Qty: 90 TABLET | Refills: 1 | Status: SHIPPED | OUTPATIENT
Start: 2023-04-20 | End: 2023-05-17

## 2023-04-20 NOTE — TELEPHONE ENCOUNTER
"Last Written Prescription Date:  5/3/2022  Last Fill Quantity: 90,  # refills: 2   Last office visit provider:  4/6/2023     Requested Prescriptions   Pending Prescriptions Disp Refills     omeprazole (PRILOSEC) 20 MG DR capsule 90 capsule 2     Sig: Take 1 capsule (20 mg) by mouth daily       PPI Protocol Passed - 4/19/2023 11:25 AM        Passed - Not on Clopidogrel (unless Pantoprazole ordered)        Passed - No diagnosis of osteoporosis on record        Passed - Recent (12 mo) or future (30 days) visit within the authorizing provider's specialty     Patient has had an office visit with the authorizing provider or a provider within the authorizing providers department within the previous 12 mos or has a future within next 30 days. See \"Patient Info\" tab in inbasket, or \"Choose Columns\" in Meds & Orders section of the refill encounter.              Passed - Medication is active on med list        Passed - Patient is age 18 or older           meclizine (ANTIVERT) 25 MG tablet 90 tablet 11     Sig: Take 1 tablet (25 mg) by mouth 3 times daily as needed for dizziness        Antivertigo/Antiemetic Agents Passed - 4/19/2023 11:25 AM        Passed - Recent (12 mo) or future (30 days) visit within the authorizing provider's specialty     Patient has had an office visit with the authorizing provider or a provider within the authorizing providers department within the previous 12 mos or has a future within next 30 days. See \"Patient Info\" tab in inbasket, or \"Choose Columns\" in Meds & Orders section of the refill encounter.              Passed - Medication is active on med list        Passed - Patient is 18 years of age or older             STEPHEN PALMA RN 04/20/23 9:16 AM  "

## 2023-04-20 NOTE — TELEPHONE ENCOUNTER
"Last Written Prescription Date:  12/8/2021  Last Fill Quantity: 90,  # refills: 11   Last office visit provider:  4/6/2023     Requested Prescriptions   Pending Prescriptions Disp Refills     omeprazole (PRILOSEC) 20 MG DR capsule 90 capsule 3     Sig: Take 1 capsule (20 mg) by mouth daily       PPI Protocol Passed - 4/19/2023 11:25 AM        Passed - Not on Clopidogrel (unless Pantoprazole ordered)        Passed - No diagnosis of osteoporosis on record        Passed - Recent (12 mo) or future (30 days) visit within the authorizing provider's specialty     Patient has had an office visit with the authorizing provider or a provider within the authorizing providers department within the previous 12 mos or has a future within next 30 days. See \"Patient Info\" tab in inbasket, or \"Choose Columns\" in Meds & Orders section of the refill encounter.              Passed - Medication is active on med list        Passed - Patient is age 18 or older           meclizine (ANTIVERT) 25 MG tablet 90 tablet 11     Sig: Take 1 tablet (25 mg) by mouth 3 times daily as needed for dizziness        Antivertigo/Antiemetic Agents Passed - 4/19/2023 11:25 AM        Passed - Recent (12 mo) or future (30 days) visit within the authorizing provider's specialty     Patient has had an office visit with the authorizing provider or a provider within the authorizing providers department within the previous 12 mos or has a future within next 30 days. See \"Patient Info\" tab in inbasket, or \"Choose Columns\" in Meds & Orders section of the refill encounter.              Passed - Medication is active on med list        Passed - Patient is 18 years of age or older             STEPHEN PALMA RN 04/20/23 10:10 AM  "

## 2023-04-24 ENCOUNTER — HOSPITAL ENCOUNTER (OUTPATIENT)
Dept: PHYSICAL THERAPY | Facility: CLINIC | Age: 83
Discharge: HOME | End: 2023-04-24
Payer: COMMERCIAL

## 2023-04-24 DIAGNOSIS — R42 DIZZINESS: Primary | ICD-10-CM

## 2023-04-24 DIAGNOSIS — H81.10 BENIGN PAROXYSMAL POSITIONAL VERTIGO, UNSPECIFIED LATERALITY: ICD-10-CM

## 2023-04-24 DIAGNOSIS — R26.89 IMBALANCE: ICD-10-CM

## 2023-04-24 PROCEDURE — 97112 NEUROMUSCULAR REEDUCATION: CPT | Mod: GP

## 2023-04-24 PROCEDURE — 95992 CANALITH REPOSITIONING PROC: CPT | Mod: GP

## 2023-04-24 NOTE — PROGRESS NOTES
"[facility]    Outpatient Physical Therapy Discharge Note  Patient: Wilfrid Ortez  : 1940    Beginning/End Dates of Reporting Period:  2023 to 2023    Referring Provider: Michi Kunz MD    Therapy Diagnosis: BPPV, unspecified laterality     Client Self Report: Pt reporting his dizziness has resolved. He's practiced lying on both his R/L sides, without an increase of his symptoms. He reports two panic attacks since he's seen PT for previous session; however, no dizziness with either attack. He reports taking Lorazepam to improve/resolve the episodes.    Goals:  Goal Identifier MET: Subjective Dizziness   Goal Description Pt will decrease self-reported dizziness to </=0-2/10 with completion of daily activities and/or vestibular rehabilitation/BPPV exercise program.   Target Date 23   Date Met  23   Progress (detail required for progress note): 2023: Pt reporting he is no longer having dizziness. He does remain fearful to lie on his back due to risk of onset of dizziness; however, he reports he doesn't typically lie on his back at baseline. He states, \"I think everything is fixed - the rest of the dizziness is due to my anxiety and panic attacks.\"     Goal Identifier MET: Positional Testing   Goal Description Pt will complete BPPV positional testing without observed nystagmus and/or increase of dizziness.   Target Date 23   Date Met  23   Progress (detail required for progress note): 2023: Pt with no dizziness and no nystagmus with sidelying positional testing.     Plan:  Discharge from therapy.    Discharge:    Reason for Discharge: Patient has met all goals.    "

## 2023-04-25 ENCOUNTER — TELEPHONE (OUTPATIENT)
Dept: NEUROLOGY | Facility: CLINIC | Age: 83
End: 2023-04-25
Payer: COMMERCIAL

## 2023-04-25 DIAGNOSIS — G25.0 BENIGN ESSENTIAL TREMOR: ICD-10-CM

## 2023-04-25 RX ORDER — PROPRANOLOL HYDROCHLORIDE 10 MG/1
TABLET ORAL
Qty: 60 TABLET | Refills: 10 | OUTPATIENT
Start: 2023-04-25

## 2023-04-25 RX ORDER — PROPRANOLOL HYDROCHLORIDE 10 MG/1
20 TABLET ORAL 2 TIMES DAILY
Qty: 180 TABLET | Refills: 1
Start: 2023-04-25 | End: 2023-04-28

## 2023-04-25 NOTE — TELEPHONE ENCOUNTER
Medication updated on med list.  FYI to provider.    Alert-The patient is alert, awake and responds to voice. The patient is oriented to time, place, and person. The triage nurse is able to obtain subjective information.

## 2023-04-25 NOTE — TELEPHONE ENCOUNTER
[facility] Call Center    Phone Message    May a detailed message be left on voicemail: yes     Reason for Call:      Patient called to update  that his medication propranolol (INDERAL) 10 MG tablet  was changed by  to 2 tabs in the morning and 2 tabs at night. Please call patient if there are any questions     Action Taken: [facility]      Travel Screening: Not Applicable

## 2023-04-25 NOTE — TELEPHONE ENCOUNTER
Patient called on 4/25/2023 to state that primary increase propranolol 10 mg tablet, 2 tabs p.o. twice daily for essential tremor.    Noted    Jairo Tamayo MD on 4/25/2023 at 4:00 PM

## 2023-04-27 ENCOUNTER — NURSE TRIAGE (OUTPATIENT)
Dept: NURSING | Facility: CLINIC | Age: 83
End: 2023-04-27
Payer: COMMERCIAL

## 2023-04-27 NOTE — TELEPHONE ENCOUNTER
Pt is wondering if he should continue to take 325 mg aspirin daily. He is not sure why he was taking it. He states it was prescribed by Dr. Carlos. Pt reports he was in [facility] recently and was told he should take 500 mg of Tylenol instead.    PCP please advise.     Reason for Disposition    [1] Caller has NON-URGENT medicine question about med that PCP prescribed AND [2] triager unable to answer question    Protocols used: MEDICATION QUESTION CALL-A-AH

## 2023-04-28 ENCOUNTER — TELEPHONE (OUTPATIENT)
Dept: NEUROLOGY | Facility: CLINIC | Age: 83
End: 2023-04-28
Payer: COMMERCIAL

## 2023-04-28 ENCOUNTER — DOCUMENTATION ONLY (OUTPATIENT)
Dept: LAB | Facility: CLINIC | Age: 83
End: 2023-04-28
Payer: COMMERCIAL

## 2023-04-28 ENCOUNTER — TELEPHONE (OUTPATIENT)
Dept: INTERNAL MEDICINE | Facility: CLINIC | Age: 83
End: 2023-04-28

## 2023-04-28 DIAGNOSIS — G25.0 BENIGN ESSENTIAL TREMOR: ICD-10-CM

## 2023-04-28 DIAGNOSIS — F41.9 ANXIETY: Primary | ICD-10-CM

## 2023-04-28 RX ORDER — PROPRANOLOL HYDROCHLORIDE 10 MG/1
TABLET ORAL
Qty: 60 TABLET | Refills: 10 | Status: SHIPPED | OUTPATIENT
Start: 2023-04-28 | End: 2023-07-03

## 2023-04-28 RX ORDER — PROPRANOLOL HYDROCHLORIDE 10 MG/1
20 TABLET ORAL 2 TIMES DAILY
Qty: 360 TABLET | Refills: 1 | Status: SHIPPED | OUTPATIENT
Start: 2023-04-28 | End: 2023-05-05

## 2023-04-28 NOTE — PROGRESS NOTES
Voicemail left for pt that included he does not need a lab appt as scheduled on 5/1 and he may cancel this appt. PCP states no labs are needed right now prior to his upcoming visit in July.     Mayito Carlos MD  [facility] Internal Medicine Support Pool 2 hours ago (8:18 AM)     MB  Please call patient for me.     Once the appointment is completed on May 1, 2023 I will place the appropriate lab orders that most likely will include A1c blood sugar lipid panel.  MECHE GOTTI

## 2023-04-28 NOTE — TELEPHONE ENCOUNTER
Spoke to pt- relayed message, verbalized understanding      Pt also states that his psychiatrist of 30 years is leaving in May- wanting to know if PCP has suggests for new psychiatrist    Ok to wait until Monday when PCP back in office

## 2023-04-28 NOTE — TELEPHONE ENCOUNTER
Sending to PCP to advise as PCP decreased aspirin from 325mg to 81mg per nurse triage on 4/27    ASA is pt reported on medlist so is not linked to diagnosis    Pt wanting to know why he is taking ASA & if important to continue as he was told to stop in hospital

## 2023-04-28 NOTE — TELEPHONE ENCOUNTER
Patient Returning Call    Reason for call:  Medication questions    Patient has additional questions:  Yes    What are your questions/concerns:  Questions about his aspirin and whether or not it's important that he be taking it, along with what he is taking it for.    Who does the patient want to speak with:  RN    Is an  needed?:  No      Could we send this information to you in [place] or would you prefer to receive a phone call?:   Patient would prefer a phone call   Okay to leave a detailed message?: Yes at Home number on file [phone] (home)

## 2023-04-28 NOTE — TELEPHONE ENCOUNTER
Health Call Center    Phone Message    May a detailed message be left on voicemail: no    Reason for Call: Medication Question or concern regarding medication   Prescription Clarification  Name of Medication: propranolol (INDERAL) 10 MG tablet  Prescribing Provider: Jairo Tamayo MD   [facility]    What on the order needs clarification? [facility] is calling to request this rx to be sent to them today. Pt is supposed to be take 4 tabs a day and they only have a Rx for 2 tabs a day. Please send Rx asap pt is out.      Action Taken: Message routed to:  [facility]    Travel Screening: Not Applicable

## 2023-04-28 NOTE — TELEPHONE ENCOUNTER
Refill request for: propranolol 10mg  Please DENY for refills on file:    propranolol (INDERAL) 10 MG tablet 360 tablet 1 4/28/2023  No   Sig - Route: Take 2 tablets (20 mg) by mouth 2 times daily - Oral   Sent to pharmacy as: Propranolol HCl 10 MG Oral Tablet (INDERAL)   Class: E-Prescribe   Order: [id number]   E-Prescribing Status: Receipt confirmed by pharmacy (4/28/2023  1:31 PM CDT     Filled earlier today. Pt now taking 2 tablets BID    Ariadna Vincent LPN on 4/28/2023 at 3:07 PM

## 2023-04-28 NOTE — TELEPHONE ENCOUNTER
Signed Prescriptions:                        Disp   Refills    propranolol (INDERAL) 10 MG tablet         360 ta*1        Sig: Take 2 tablets (20 mg) by mouth 2 times daily  Authorizing Provider: JULI CID  Ordering User: TIKI MERCADO    Refill approved, was refilled 4/25 for print only, was not set to pharmacy at that time. RN sent to preferred pharmacy.     Upon further review of chart, increased dose was prescribed by PCP. Pt out of medication, discussed with provider, refill ok.     Tiki Mercado RN, BSN  [facility]

## 2023-05-05 DIAGNOSIS — G25.0 BENIGN ESSENTIAL TREMOR: ICD-10-CM

## 2023-05-05 RX ORDER — PROPRANOLOL HYDROCHLORIDE 10 MG/1
20 TABLET ORAL 2 TIMES DAILY
Qty: 360 TABLET | Refills: 1 | Status: SHIPPED | OUTPATIENT
Start: 2023-05-05 | End: 2023-08-30

## 2023-05-05 NOTE — TELEPHONE ENCOUNTER
Spoke to pt. He states he is no longer using [facility]. He would like refill for propranolol 10mg sent to [facility] on [address]  .  Refill request for: propranolol 10mg   Directions: take 2 tablets by mouth twice a day.    LOV: 08/30/22  NOV: 08/30/23    90 day supply with 1 refills Medication T'd for review and signature    Ariadna Vincent LPN on 5/5/2023 at 11:01 AM

## 2023-05-16 DIAGNOSIS — I10 ESSENTIAL HYPERTENSION: ICD-10-CM

## 2023-05-17 RX ORDER — MECLIZINE HYDROCHLORIDE 25 MG/1
TABLET ORAL
Qty: 90 TABLET | Refills: 4 | Status: SHIPPED | OUTPATIENT
Start: 2023-05-17 | End: 2023-07-03

## 2023-05-18 NOTE — TELEPHONE ENCOUNTER
"Last Written Prescription Date:  4/20/23  Last Fill Quantity: 90,  # refills: 1   Last office visit provider:  4/6/23     Requested Prescriptions   Pending Prescriptions Disp Refills     meclizine (ANTIVERT) 25 MG tablet [Pharmacy Med Name: MECLIZINE 25 MG TABS 25 Tablet] 90 tablet 10     Sig: TAKE 1 TABLET BY MOUTH 3 TIMES DAILY AS NEEDED FOR DIZZINESS        Antivertigo/Antiemetic Agents Passed - 5/16/2023  6:30 PM        Passed - Recent (12 mo) or future (30 days) visit within the authorizing provider's specialty     Patient has had an office visit with the authorizing provider or a provider within the authorizing providers department within the previous 12 mos or has a future within next 30 days. See \"Patient Info\" tab in inbasket, or \"Choose Columns\" in Meds & Orders section of the refill encounter.              Passed - Medication is active on med list        Passed - Patient is 18 years of age or older             Lora Barron RN 05/17/23 8:06 PM  "

## 2023-07-01 ENCOUNTER — HEALTH MAINTENANCE LETTER (OUTPATIENT)
Age: 83
End: 2023-07-01

## 2023-07-03 ENCOUNTER — NURSE TRIAGE (OUTPATIENT)
Dept: NURSING | Facility: CLINIC | Age: 83
End: 2023-07-03

## 2023-07-03 ENCOUNTER — OFFICE VISIT (OUTPATIENT)
Dept: INTERNAL MEDICINE | Facility: CLINIC | Age: 83
End: 2023-07-03
Payer: COMMERCIAL

## 2023-07-03 VITALS
OXYGEN SATURATION: 97 % | HEIGHT: 65 IN | RESPIRATION RATE: 16 BRPM | DIASTOLIC BLOOD PRESSURE: 94 MMHG | HEART RATE: 59 BPM | SYSTOLIC BLOOD PRESSURE: 166 MMHG | WEIGHT: 151.7 LBS | BODY MASS INDEX: 25.27 KG/M2 | TEMPERATURE: 98 F

## 2023-07-03 DIAGNOSIS — I10 ESSENTIAL HYPERTENSION: ICD-10-CM

## 2023-07-03 DIAGNOSIS — N18.31 CHRONIC KIDNEY DISEASE, STAGE 3A (H): ICD-10-CM

## 2023-07-03 DIAGNOSIS — E11.8 TYPE 2 DIABETES MELLITUS WITH COMPLICATION, WITHOUT LONG-TERM CURRENT USE OF INSULIN (H): Primary | ICD-10-CM

## 2023-07-03 PROBLEM — R56.9 SEIZURE (H): Status: RESOLVED | Noted: 2019-10-18 | Resolved: 2023-07-03

## 2023-07-03 LAB
CHOLEST SERPL-MCNC: 165 MG/DL
FASTING STATUS PATIENT QL REPORTED: ABNORMAL
GLUCOSE SERPL-MCNC: 152 MG/DL (ref 70–99)
HBA1C MFR BLD: 6.7 % (ref 0–5.6)
HDLC SERPL-MCNC: 52 MG/DL
LDLC SERPL CALC-MCNC: 82 MG/DL
NONHDLC SERPL-MCNC: 113 MG/DL
TRIGL SERPL-MCNC: 154 MG/DL

## 2023-07-03 PROCEDURE — 83036 HEMOGLOBIN GLYCOSYLATED A1C: CPT | Performed by: INTERNAL MEDICINE

## 2023-07-03 PROCEDURE — 80061 LIPID PANEL: CPT | Performed by: INTERNAL MEDICINE

## 2023-07-03 PROCEDURE — 36415 COLL VENOUS BLD VENIPUNCTURE: CPT | Performed by: INTERNAL MEDICINE

## 2023-07-03 PROCEDURE — 82947 ASSAY GLUCOSE BLOOD QUANT: CPT | Performed by: INTERNAL MEDICINE

## 2023-07-03 PROCEDURE — 99214 OFFICE O/P EST MOD 30 MIN: CPT | Performed by: INTERNAL MEDICINE

## 2023-07-03 RX ORDER — SIMVASTATIN 20 MG
20 TABLET ORAL AT BEDTIME
Qty: 90 TABLET | Refills: 11 | Status: SHIPPED | OUTPATIENT
Start: 2023-07-03 | End: 2024-07-22

## 2023-07-03 ASSESSMENT — PATIENT HEALTH QUESTIONNAIRE - PHQ9
10. IF YOU CHECKED OFF ANY PROBLEMS, HOW DIFFICULT HAVE THESE PROBLEMS MADE IT FOR YOU TO DO YOUR WORK, TAKE CARE OF THINGS AT HOME, OR GET ALONG WITH OTHER PEOPLE: SOMEWHAT DIFFICULT
SUM OF ALL RESPONSES TO PHQ QUESTIONS 1-9: 8
SUM OF ALL RESPONSES TO PHQ QUESTIONS 1-9: 8

## 2023-07-03 ASSESSMENT — PAIN SCALES - GENERAL: PAINLEVEL: NO PAIN (0)

## 2023-07-03 NOTE — LETTER
My Depression Action Plan  Name: Wilfrid Ortez   Date of Birth 1940  Date: 7/3/2023    My doctor: Mayito Carlos   My clinic: [address] [address]  [phone]            GREEN    ZONE   Good Control    What it looks like:   Things are going generally well. You have normal ups and downs. You may even feel depressed from time to time, but bad moods usually last less than a day.   What you need to do:  Continue to care for yourself (see self care plan)  Check your depression survival kit and update it as needed  Follow your physician s recommendations including any medication.  Do not stop taking medication unless you consult with your physician first.             YELLOW         ZONE Getting Worse    What it looks like:   Depression is starting to interfere with your life.   It may be hard to get out of bed; you may be starting to isolate yourself from others.  Symptoms of depression are starting to last most all day and this has happened for several days.   You may have suicidal thoughts but they are not constant.   What you need to do:     Call your care team. Your response to treatment will improve if you keep your care team informed of your progress. Yellow periods are signs an adjustment may need to be made.     Continue your self-care.  Just get dressed and ready for the day.  Don't give yourself time to talk yourself out of it.    Talk to someone in your support network.    Open up your Depression Self-Care Plan/Wellness Kit.             RED    ZONE Medical Alert - Get Help    What it looks like:   Depression is seriously interfering with your life.   You may experience these or other symptoms: You can t get out of bed most days, can t work or engage in other necessary activities, you have trouble taking care of basic hygiene, or basic responsibilities, thoughts of suicide or death that will not go away,  self-injurious behavior.     What you need to do:  Call your care team and request a same-day appointment. If they are not available (weekends or after hours) call your local crisis line, emergency room or 911.          Depression Self-Care Plan / Wellness Kit    Many people find that medication and therapy are helpful treatments for managing depression. In addition, making small changes to your everyday life can help to boost your mood and improve your wellbeing. Below are some tips for you to consider. Be sure to talk with your medical provider and/or behavioral health consultant if your symptoms are worsening or not improving.     Sleep   Sleep hygiene  means all of the habits that support good, restful sleep. It includes maintaining a consistent bedtime and wake time, using your bedroom only for sleeping or sex, and keeping the bedroom dark and free of distractions like a computer, smartphone, or television.     Develop a Healthy Routine  Maintain good hygiene. Get out of bed in the morning, make your bed, brush your teeth, take a shower, and get dressed. Don t spend too much time viewing media that makes you feel stressed. Find time to relax each day.    Exercise  Get some form of exercise every day. This will help reduce pain and release endorphins, the  feel good  chemicals in your brain. It can be as simple as just going for a walk or doing some gardening, anything that will get you moving.      Diet  Strive to eat healthy foods, including fruits and vegetables. Drink plenty of water. Avoid excessive sugar, caffeine, alcohol, and other mood-altering substances.     Stay Connected with Others  Stay in touch with friends and family members.    Manage Your Mood  Try deep breathing, massage therapy, biofeedback, or meditation. Take part in fun activities when you can. Try to find something to smile about each day.     Psychotherapy  Be open to working with a therapist if your provider recommends it.      Medication  Be sure to take your medication as prescribed. Most anti-depressants need to be taken every day. It usually takes several weeks for medications to work. Not all medicines work for all people. It is important to follow-up with your provider to make sure you have a treatment plan that is working for you. Do not stop your medication abruptly without first discussing it with your provider.    Crisis Resources   These hotlines are for both adults and children. They and are open 24 hours a day, 7 days a week unless noted otherwise.    National Suicide Prevention Lifeline   988 or 9-121-560-TKLU (5732)    Crisis Text Line    www.crisistextline.org  Text HOME to 455737 from anywhere in the [place], anytime, about any type of crisis. A live, trained crisis counselor will receive the text and respond quickly.    [facility] Lifeline for LGBTQ Youth  A national crisis intervention and suicide lifeline for LGBTQ youth under 25. Provides a safe place to talk without judgement. Call 1-229.715.1507; text START to 673494 or visit www.thetrevorproject.org to talk to a trained counselor.    For [facility] crisis numbers, visit the [facility] website at:  https://[place].gov/dhs/people-we-serve/adults/health-care/mental-health/resources/crisis-contacts.jsp

## 2023-07-03 NOTE — PROGRESS NOTES
"  {PROVIDER CHARTING PREFERENCE:769861}    Gaby Carrion is a 82 year old, presenting for the following health issues:  Follow Up and Labs (Pt states that he is fasting)        4/3/2023     8:44 AM   Additional Questions   Roomed by Janelle Velázquez CMA     History of Present Illness       Reason for visit:  Follow Up    He eats 2-3 servings of fruits and vegetables daily.He consumes 0 sweetened beverage(s) daily.He exercises with enough effort to increase his heart rate 9 or less minutes per day.  He exercises with enough effort to increase his heart rate 3 or less days per week. He is missing 1 dose(s) of medications per week.  He is not taking prescribed medications regularly due to remembering to take.    Today's PHQ-9         PHQ-9 Total Score: 8    PHQ-9 Q9 Thoughts of better off dead/self-harm past 2 weeks :   Not at all    How difficult have these problems made it for you to do your work, take care of things at home, or get along with other people: Somewhat difficult       {SUPERLIST (Optional):064315}  {additonal problems for provider to add (Optional):296530}      Review of Systems   {ROS COMP (Optional):477615}      Objective    BP (!) 166/94 (BP Location: Right arm, Patient Position: Sitting, Cuff Size: Adult Regular)   Pulse 59   Temp 98  F (36.7  C) (Oral)   Resp 16   Ht 1.651 m (5' 5\")   Wt 68.8 kg (151 lb 11.2 oz)   SpO2 97%   BMI 25.24 kg/m    Body mass index is 25.24 kg/m .  Physical Exam   {Exam List (Optional):807124}    {Diagnostic Test Results (Optional):832627}    {AMBULATORY ATTESTATION (Optional):149365}            "

## 2023-07-03 NOTE — PROGRESS NOTES
"Assessment/Plan:    Diabetes mellitus type 2 check A1c blood sugar lipid panel today.    Hyperlipidemia on statin therapy refill simvastatin stable.    Chronic kidney disease stable.  Needs annual wellness visit for reassessment of BUN/creatinine.  Currently asymptomatic stable.  The best way to preserve kidney function is to stay well-hydrated to keep blood pressure normal and to avoid nonsteroidal anti-inflammatory drugs available over-the-counter such as naproxen sodium and ibuprofen.  Acetaminophen or Tylenol safer for the kidney.    Hypertension with mild elevation 158/76 recheck.  Continue propanolol 40 mg twice daily.  Consider HCTZ and/or lisinopril addition.  If blood pressure remains elevated with annual wellness visit 4 months time reemphasized salt restriction regular exercise.  To help lower blood pressure.    Anxiety without depression no suicidal ideations followed by a new psychiatrist Dr. DISLA  Continue paroxetine same.    25 minutes spent on the date of the encounter doing chart review, patient visit and documentation     Subjective:  Wilfrid Ortez is a 82 year old male presents for the following health issues history as above more anxiety of late followed by a new psychiatrist Dr. AHUJA.  Continue paroxetine for now.    Higher dose of propanolol 40 mg twice daily has helped with tremor.  He appears stronger.  Blood pressure elevation as noted above.  Recheck 158/76 right arm sitting.  Currently on propanolol.  Consider lisinopril HCTZ combination if blood pressure remains elevated 4 months time with AWV.    ROS:  No blood in stool or urine denies chest pain shortness of breath medication list reviewed reconciled.    Objective:  BP (!) 166/94 (BP Location: Right arm, Patient Position: Sitting, Cuff Size: Adult Regular)   Pulse 59   Temp 98  F (36.7  C) (Oral)   Resp 16   Ht 1.651 m (5' 5\")   Wt 68.8 kg (151 lb 11.2 oz)   SpO2 97%   BMI 25.24 kg/m    Neck veins nondistended no carotid bruits " lungs clear heart tones normal belly soft nontender no leg edema less tremulous.  Previous essential tremor improved with higher dose of propanolol.  Already milligrams twice daily.  Continue same.    Mayito Carlos MD  Internal Medicine    Answers for HPI/ROS submitted by the patient on 7/3/2023  If you checked off any problems, how difficult have these problems made it for you to do your work, take care of things at home, or get along with other people?: Somewhat difficult  PHQ9 TOTAL SCORE: 8  What is the reason for your visit today? : Follow Up  How many servings of fruits and vegetables do you eat daily?: 2-3  On average, how many sweetened beverages do you drink each day (Examples: soda, juice, sweet tea, etc.  Do NOT count diet or artificially sweetened beverages)?: 0  How many minutes a day do you exercise enough to make your heart beat faster?: 9 or less  How many days a week do you exercise enough to make your heart beat faster?: 3 or less  How many days per week do you miss taking your medication?: 1  What makes it hard for you to take your medication every day?: remembering to take

## 2023-07-04 NOTE — TELEPHONE ENCOUNTER
Wilfrid calls and says that in the am, his blood pressure was 164/90. Pt. Says that later, his blood pressure was 154/? Pt. Says that he is anxious about his blood pressure.     Reason for Disposition    [1] Systolic BP  >= 130 OR Diastolic >= 80 AND [2] taking BP medications    Additional Information    Negative: Difficult to awaken or acting confused (e.g., disoriented, slurred speech)    Negative: SEVERE difficulty breathing (e.g., struggling for each breath, speaks in single words)    Negative: [1] Weakness of the face, arm or leg on one side of the body AND [2] new-onset    Negative: [1] Numbness (i.e., loss of sensation) of the face, arm or leg on one side of the body AND [2] new-onset    Negative: [1] Chest pain lasts > 5 minutes AND [2] history of heart disease (i.e., heart attack, bypass surgery, angina, angioplasty, CHF)    Negative: [1] Chest pain AND [2] took nitrogylcerin AND [3] pain was not relieved    Negative: Sounds like a life-threatening emergency to the triager    Negative: Symptom is main concern (e.g., headache, chest pain)    Negative: Low blood pressure is main concern    Negative: [1] Systolic BP  >= 160 OR Diastolic >= 100 AND [2] cardiac or neurologic symptoms (e.g., chest pain, difficulty breathing, unsteady gait, blurred vision)    Negative: [1] Pregnant 20 or more weeks (or postpartum < 6 weeks) AND [2] new hand or face swelling    Negative: [1] Pregnant 20 or more weeks (or postpartum < 6 weeks) AND [2] Systolic BP >= 160 OR Diastolic >= 100    Negative: [1] Systolic BP  >= 200 OR Diastolic >= 120 AND [2] having NO cardiac or neurologic symptoms    Negative: [1] Pregnant 20 or more weeks (or postpartum < 6 weeks) AND [2] Systolic BP  >= 140 OR Diastolic >= 90    Negative: [1] Systolic BP  >= 180 OR Diastolic >= 110 AND [2] missed most recent dose of blood pressure medication    Negative: Systolic BP  >= 180 OR Diastolic >= 110    Negative: Ran out of BP medications    Negative:  Systolic BP  >= 160 OR Diastolic >= 100    Negative: [1] Taking BP medications AND [2] feels is having side effects (e.g., impotence, cough, dizzy upon standing)    Negative: [1] Systolic BP  >= 130 OR Diastolic >= 80 AND [2] pregnant    Protocols used: BLOOD PRESSURE - HIGH-A-AH

## 2023-07-07 ENCOUNTER — TELEPHONE (OUTPATIENT)
Dept: INTERNAL MEDICINE | Facility: CLINIC | Age: 83
End: 2023-07-07
Payer: COMMERCIAL

## 2023-07-07 DIAGNOSIS — Z00.00 ROUTINE GENERAL MEDICAL EXAMINATION AT A HEALTH CARE FACILITY: ICD-10-CM

## 2023-07-07 NOTE — TELEPHONE ENCOUNTER
General Call      Reason for Call:  Wrong Pharmacy    What are your questions or concerns:  OMEPRAZOLE 20 mg capsule     [facility]  seeking RX transferred.     Date of last appointment with provider: 07/03/2023       rhinorrhea

## 2023-07-10 ENCOUNTER — NURSE TRIAGE (OUTPATIENT)
Dept: NURSING | Facility: CLINIC | Age: 83
End: 2023-07-10
Payer: COMMERCIAL

## 2023-07-10 DIAGNOSIS — E11.8 TYPE 2 DIABETES MELLITUS WITH COMPLICATION, WITHOUT LONG-TERM CURRENT USE OF INSULIN (H): Primary | ICD-10-CM

## 2023-07-10 RX ORDER — LISINOPRIL/HYDROCHLOROTHIAZIDE 10-12.5 MG
1 TABLET ORAL DAILY
Qty: 30 TABLET | Refills: 11 | Status: SHIPPED | OUTPATIENT
Start: 2023-07-10 | End: 2023-11-06

## 2023-07-10 NOTE — TELEPHONE ENCOUNTER
Left message for pt to call back  Dr Carlos sent in prescription for Lisinopril hydrochlorothiazide 10/12.5 to pharmacy- patient should start this medication    Also sent in test strips to pharmacy    Please assist in scheduling phone visit in 1 week to review blood pressures after starting medication

## 2023-07-10 NOTE — TELEPHONE ENCOUNTER
Nurse Triage SBAR    Is this a 2nd Level Triage? YES, LICENSED PRACTITIONER REVIEW IS REQUIRED    Situation: Patient calling with concerns about his BP and need for new script for testing strips   Consent: not needed    Background: Chart review from last visit: Hypertension with mild elevation 158/76 recheck. Continue propanolol 40 mg twice daily. Consider HCTZ and/or lisinopril addition. If blood pressure remains elevated with annual wellness visit 4 months time reemphasized salt restriction regular exercise. To help lower blood pressure     Assessment: was in to see PCP one week ago  States he had two different /96 and 157/96 one week ago  188/100 last Monday  Went to the fire department on Saturday to get his BP checked 177/100- fire department  166/96 today 1230pm   Denies any symptoms  Propranolol for a tremor- not necessarily for BP     Patient also indicates he needs BG strips- states he hasn't checked his BG in over one week- states he is supposed to check his BG once a day- accucheck is the brand he uses.     Protocol Recommended Disposition: appointment with PCP within 3 days       Recommendation: Advised to schedule an appointment with his PCP. Patient is open to doing so- no available visits until next week. Patient would like a message to his PCP- should he start on BP medications?     Patient also requested BG testing strips. Unable to find on medication list so unable to pend- patient states PCP has written for them in the past. States he tests once per day.     Routed to provider    Does the patient meet one of the following criteria for ADS visit consideration? 16+ years old, with an [facility] PCP     TIP  Providers, please consider if this condition is appropriate for management at one of our Acute and Diagnostic Services sites.     If patient is a good candidate, please use dotphrase <dot>triageresponse and select Refer to ADS to document.      Josiane Chowdary RN 2:14 PM 7/10/2023  Reason for  Disposition   Systolic BP >= 160 OR Diastolic >= 100    Additional Information   Negative: Sounds like a life-threatening emergency to the triager   Negative: Symptom is main concern (e.g., headache, chest pain)   Negative: Low blood pressure is main concern   Negative: Systolic BP >= 160 OR Diastolic >= 100, and any cardiac or neurologic symptoms (e.g., chest pain, difficulty breathing, unsteady gait, blurred vision)   Negative: Pregnant 20 or more weeks (or postpartum < 6 weeks) with new hand or face swelling   Negative: Pregnant 20 or more weeks (or postpartum < 6 weeks) and Systolic BP >= 160 OR Diastolic >= 100   Negative: Patient sounds very sick or weak to the triager   Negative: Systolic BP >= 200 OR Diastolic >= 120 and having NO cardiac or neurologic symptoms   Negative: Pregnant 20 or more weeks (or postpartum < 6 weeks) with Systolic BP >= 140 OR Diastolic >= 90   Negative: Systolic BP >= 180 OR Diastolic >= 110, and missed most recent dose of blood pressure medication   Negative: Systolic BP >= 180 OR Diastolic >= 110   Negative: Patient wants to be seen   Negative: Ran out of BP medications   Negative: Taking BP medications and feels is having side effects (e.g., impotence, cough, dizziness)    Protocols used: Blood Pressure - High-A-OH

## 2023-07-10 NOTE — TELEPHONE ENCOUNTER
Mayito Carlos MD  [facility] Internal Medicine Support Pool 5 minutes ago (3:03 PM)     ANGELA Santoyo for lisinopril HCTZ 10/12.5 dispense number 30 tablets take 1 tablet in the morning and schedule a office visit with me either by phone virtual visit in 1 week or a in person visit in a month to review and discuss and recheck blood pressure         In addition please write out a prescription for glucose test strips.  Dispense #100 use daily 1 with 11 refills.  And the diagnosis for this is type 2 diabetes mellitus.     Please asked patient to make a phone visit with me in 1 week to review and discuss.  I will try to send the orders for the test strips and the lisinopril HCTZ myself.  Please call patient pharmacy.  MECHE GOTTI

## 2023-07-10 NOTE — TELEPHONE ENCOUNTER
Patient Returning Call    Reason for call:  Returning Call to Clinic    Information relayed to patient:  Relayed message.  Patient will go to pharmacy to  RX    Accepted offer to schedule follow up appt 07/18/2023 - patient will be available at 3PM    Patient has additional questions:  No

## 2023-07-18 ENCOUNTER — VIRTUAL VISIT (OUTPATIENT)
Dept: INTERNAL MEDICINE | Facility: CLINIC | Age: 83
End: 2023-07-18
Payer: COMMERCIAL

## 2023-07-18 DIAGNOSIS — F33.41 MAJOR DEPRESSIVE DISORDER, RECURRENT EPISODE, IN PARTIAL REMISSION (H): ICD-10-CM

## 2023-07-18 DIAGNOSIS — N18.31 CHRONIC KIDNEY DISEASE, STAGE 3A (H): ICD-10-CM

## 2023-07-18 PROCEDURE — 99213 OFFICE O/P EST LOW 20 MIN: CPT | Mod: 93 | Performed by: INTERNAL MEDICINE

## 2023-07-18 NOTE — PROGRESS NOTES
Wilfrid Ortez is a 82 year oldwho is being evaluated via a billable telephone visit.       What phone number would you like to be contacted at? [phone]      Assessment & Plan  Hypertension with improved control on lisinopril HCTZ 10/12.5.  No cough no lip or tongue swelling.  Tolerates well.  Less of a headache and feels better.    Diabetes mellitus type 2 periodic examinations required and recommended 2-3 times per year for A1c blood sugar lipid panel.  Patient understands.  Insulin resistance previously noted.     11 minutes spent on the date of the encounter doing chart review, patient visit and documentation and I spoke with the patient directly 5 minutes.  We had a good discussion.  Tolerates the new med lisinopril HCTZ 10/12.51 daily quite well usually takes it in the morning.        Subjective   Feels improved less fatigue no headache.  Blood pressure numbers are being monitored by the patient and are coming down nicely.  He has had no target organ damage related to hypertension or type 2 diabetes.  No history of MI or stroke.     Review of Systems   No blood in stool or urine denies chest pain shortness of breath medication list reviewed reconciled in the chart.       Mayito Carlos MD

## 2023-07-31 ENCOUNTER — TELEPHONE (OUTPATIENT)
Dept: INTERNAL MEDICINE | Facility: CLINIC | Age: 83
End: 2023-07-31
Payer: COMMERCIAL

## 2023-07-31 NOTE — TELEPHONE ENCOUNTER
Reason for Call:  Other call back    Detailed comments: Patient reports he fell on 07/19/2023 and did not feel there was anything wrong or injured at the time.  However this past week right knee pain that is not responding to the Hemp salve he uses.    Would like to know if he could have XR done or how Dr. Carlos would recommend?    Phone Number Patient can be reached at: Cell number on file:    Telephone Information:   Mobile [phone]       Best Time: Any    Can we leave a detailed message on this number? YES    Call taken on 7/31/2023 at 11:48 AM by Lora Duarte

## 2023-08-07 ENCOUNTER — VIRTUAL VISIT (OUTPATIENT)
Dept: INTERNAL MEDICINE | Facility: CLINIC | Age: 83
End: 2023-08-07
Payer: COMMERCIAL

## 2023-08-07 DIAGNOSIS — M25.561 ACUTE PAIN OF RIGHT KNEE: Primary | ICD-10-CM

## 2023-08-07 PROCEDURE — 99213 OFFICE O/P EST LOW 20 MIN: CPT | Mod: 93 | Performed by: INTERNAL MEDICINE

## 2023-08-07 NOTE — PROGRESS NOTES
Wilfrid Ortez is a 82 year oldwho is being evaluated via a billable telephone visit.       What phone number would you like to be contacted at? [phone]      Assessment & Plan  Knee pain after fall probably Saturday, 29 July 2023.  Able to bear weight on it was headed to a party but thinks he may have a fracture.  Suggest [facility] quick clinic for further evaluation treatment and possible imaging study.    Lability in blood pressure.  174/96 recheck 117/68 initial pulse 100+ now 94.  Denies chest pain shortness of breath palpitations or lightheadedness.  May need further evaluation at walk-in care or emergency room or this clinic.  For in person evaluation.  We had a good discussion.  Feels well despite some lability in blood pressure.  Same antihypertensive meds advised to be continued including lisinopril HCTZ 10/12.5+ propranolol 10 mg 4 times daily.  Also uses for tremor.  With neurology.  Dr. Campos.  11 minutes spent on the date of the encounter doing chart review, patient visit and documentation        Subjective  History as noted above.  Feels well not any symptoms of chest pain shortness of breath palpitations or syncope.  Some lability in blood pressure noted.  Initial injury of right knee after fall on his way to a party.  Able to bear weight on this leg but concerned about a fracture.  [facility] may be advisable.     Review of Systems   Denies chest pain or shortness of breath no palpitations or syncope or near syncope.  No blood in stool or urine.  Discussed antihypertensive meds.  With next refill will give 90 days as opposed to 30 days.  We had a good discussion.       Mayito Carlos MD

## 2023-08-29 NOTE — PROGRESS NOTES
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    Primary MD working on his hypertension  Patient states that his pulse had been running \"high\"    He has some chronic right shoulder difficulty followed by other physicians I gave him some range of motion exercises below to prevent frozen shoulder    Used to play the piano he has 1 but he just does not do it much anymore  He does do a lot of crossword puzzles  His MoCA score is stable memory is okay          A.  TGA        April 1, 2021        Since last seen no further episodes of \"amnesia\"         EEG 8/23/2021, shows a little bit of mild 5-6 Hz theta disorganization on the right hemisphere rarely         EEG 2016 showed a little bit of the left theta disorganization 5-6 Hz        This is a fairly nonspecific finding prefer not to add any antiepileptic medications unless he had stereotypical episodes with a discrete onset and offset.    MoCA  4/21/2021,        24 out of 30  8/30/2021,        27 out of 30  8/30/2023,        27 out of 30    Patient thought that his memory was maybe a little bit worse but when we did actual testing he did well seems to be stable  Talked about good routines  Talked about " exercise  Talked about reasonable nutrition  Talked about good sleep cycle  Also talked about music he is a  does have a keyboard and may be doing that a little bit would help stimulate the brain        B.  Essential tremor        Head to the [place] is also left-handed and has some left hand tremor        In regards to his essential tremor he is moved into an apartment        He is not out in about quite as much        He uses propranolol 10 mg tablet, 2 tabs p.o. twice daily         Has more trouble when he is trying to eat dinner due to the action component        Talked about some of the newer treatments that are out but I do not feel this tremor is bad enough to warrant those he agreed    C.  Vertigo         patient was in the ER on June 24, 2021 with vertigo       Patient blamed it on actually missing his morning medicines that day which include Paxil but sometimes an abrupt change in dose can cause unsteadiness/dizziness       Seem to respond to treatment with meclizine and some exercises       Work-up showed a CTA with questionable changes on the intracranial vessels but there was motion artifact       Follow-up MRA showed no significant intracranial stenosis        Laboratory data reviewed    D.  Kidney stones 7/5/2022 passed them without intervention              Past neurologic history review:  April 1, 2021 had difficulty with amnesia  He had woke up in the afternoon at about 12:30 PM on 4/1/2021.  He seemed to be forgetting events of the morning.  It seemed that the memory difficulty or amnesia was transient  He was alone at the time that this was occurring  He went on to have an MRI scan of the brain that was unremarkable    Patient had seen a psychiatrist on March 18, 2021  Has had 8 panic attacks over 2 months.  More than usual    Patient states that on April 1, 2021 he had gotten up in the morning had breakfast  He then picked up a friend and took his friend from the [place] over to the  "[facility] to drive him off for an appointment  He felt awful he was driving over there  He dropped him off and then left him  When he came home he talk to his partner stated that he was not feeling well  He laid down  He then awoke later and seemed to have forgotten what had happened over at least an hour to couple hours    10 to 15 years ago he was up at [facility] with his brother he was out on a boat  He supposedly \"passed out\" in [facility] 7 PM in the evening  Did not have any recollection  His 81 mg of aspirin was increased to full aspirin possible TIA  Unclear if that was an amnestic spell    He is on some propranolol 10 mg, 2 tabs twice daily for his tremor   Does have some inhalers but does not get exacerbated by his propranolol    Formal [place] testing today was 24 out of 30    The patient does have difficulty with some head tremor, some voice tremor, and some hand tremor.    The patient notices the tremor more when he is more stressed or anxious.    He does have a past history of panic attacks, which are variable and uses lorazepam for this.        Past medical history    1. History of action tremor going on for five plus years evaluated by Dr. Michi Alanis in the past in 2016.  2. 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  3. Has panic attacks, is on lorazepam, which may give some of the fatigue.  4. Has a past note mentioning some mild memory loss.  5. Hyperlipidemia.  6. Obstructive sleep apnea going on for greater than six plus years.  7. Some visual changes with loss of vision in the left eye and droopiness of the right eye.  8.  Left carpal tunnel syndrome  9.  History of panic attacks with dizziness and off-balance sensation  10.  Diabetes mellitus.  Hemoglobin A1c's 6.9%, 6.6%, 7.3%    Past medical history  Anxiety/panic attacks  Hyperlipidemia  Diabetes  Benign prostatic hypertrophy  Hiatal hernia  Obstructive sleep " apnea  Blepharospasm      Habits  Does not smoke  Does not drink alcohol  Patient is left-handed  Goals right-handed  Goals left-handed  Mouse on computer uses the right hand      Family history  Father with heart disease  at 57 also had rheumatic fever  Mother with cancer of the throat  at 72 also had diabetes mellitus.   Brother with epilepsy, DM  No family history of tremor    Past work-up  1. MRI scan of the head December 15, 2016 small vessel changes, small encephalomalacia in the posterior body of  the corpus callosum.  2. EEG on December 15, 2016, dysrhythmia grade II read by Dr. Taylor with some 8 Hz posterior dominant rhythm, some 5 to 6 Hz theta, a little bit of left temporal slowing.    Recent work-up  MRI scan brain 2021  A.  No acute stroke mass or bleed  B.  Mild to moderate atrophy and chronic small vessel changes  C.  Mild to moderate cerebellar atrophy   laboratory data 2021  Sodium 140 potassium 4.3  BUN 18 creatinine 1.2  Glucose 107  White blood count 6.5, hemoglobin 14.5, platelets 154,000  [place] cognitive assessment score 2021,24 out of 30  Previous writing samples in the chart.    CT scan head 2021  A. No CT evidence for acute intracranial process.   B. Brain atrophy and presumed chronic microvascular ischemic changes as above.   HEAD CTA: 2021  1.  Apparent loss of contrast opacification involving a diminutive in size, proximal to mid left M2 anterior temporal division middle cerebral artery with distal reconstitution. There is motion artifact in this region and this finding could be artifactual, however, a high-grade stenosis versus short segment occlusion with distal reconstitution are other considerations. Correlation for left MCA territory neurologic deficit is recommended consider MRI for further assessment.   2.  Short segment moderate to severe stenosis versus motion artifact of a mid right M2 ascending division middle cerebral artery.    3.  No proximal large vessel occlusion, aneurysm or high flow vascular malformation.   NECK CTA: 6/24/2021  1.  No significant stenosis or dissection.    MRI scan head 6/25/2021  A.  No acute findings.   B.  Mild age-related changes.   HEAD MRA: 6/25/2021  No stenosis/occlusion, aneurysm, or high flow vascular malformation.   NECK MRA:   No measurable stenosis or dissection.  Hemoglobin A1c 6.4% (7/6/2021)  HDL 50/, (7/6/2021)  EEG 8/23/2021, 8 Hz predominant rhythm, rare 5 to 6 Hz theta disorganization of the right hemisphere no specific epileptiform discharges  MRI had 3/27/2023  1. No acute findings. Age-related changes.   2.  Mild to moderate generalized tubal atrophy  3.  Scattered nonspecific T2 flair changes  HEAD CT: 3/27/2023  No acute intracranial process.  HEAD CTA: 3/27/2023  Normal CTA [place] [facility] [place].  NECK CTA: 3/27/2023  Normal neck CTA.      This is a visit that stroke I and his Holter was benign.  He needs a referral to cardiology        Laboratory data review                      3/2023            7/2023  NA/K            139/4.1  BUN/Cr         21/1.28  GLU              202  WBC/HGB    6.9/14.6  PLTs             144,000  HGBA1C                             6.7  HDL/LDL                             52/82        MoCA    4/21/2021,        24 out of 30  8/30/2021,        27 out of 30  8/30/2023,        27 out of 30    Exam   Review of Systems   No headache no chest pain no shortness of breath no nausea vomiting no diarrhea no fever chills    No diplopia no dysarthria no dysphagia    Has no wheezing breathing okay    Has some intermittent dizziness  No hearing loss  No tinnitus    Chronic right shoulder pain with some decreased range of motion  Gait is adequate but he does have a cane and 2 different walkers depending on his balance he will use 1    Does crossword puzzles to help his memory    Chronic tremor    Otherwise review of systems negative    General exam  Blood pressure 117/62, pulse  "69  HEENT normal  Lungs clear no wheezing  Heart rate regular  Abdomen soft  Symmetrical pulses  No edema the feet      Neurologic exam  Alert oriented x3  Normal prosody speech  Normal naming  Normal repetition  Normal comprehension  No neglect  Memory okay in the past has had some very subtle difficulty with memory  8/30/2023,        27 out of 30    Cranials 2 through 12  No ophthalmoplegia  No nystagmus  Face symmetrical  Decreased visual acuity left eye chronic  No visual field cut  Tongue twisters okay  Hypophonic voice/slightly raspy      Parkinsonian features  Slight decrease in blink  Soft voice  Action tremor  Slight voice tremor  Not much head to titubation  Left eyelid heavier than right chronic      Upper extremities  No drift  Mild action tremor  Finger-nose okay    Mild decreased range of motion of the right shoulder I gave him some range of motion exercises    Lower extremities  Test in the seated position good strength    Gait  Gets up with his arms crossed  Ambulates back-and-forth in the room with fairly good turns  Does have an assistive device to use when his balance is not so good        Reflexes symmetrical  No Swift reflex        Assessment/Plan       1.   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        EEG 2016 mild left theta disorganization rare        EEG 2021 mild right theta disorganization rare        Spell is not frequent enough or stereotypical enough to warrant trial of medication discussed with patient    2.  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  CTS (carpal tunnel syndrome) (G56.02)        Patient is left-handed       Patient does play piano for most of his life       EMG " September 11, 2019 shows moderate left CTS with active and chronic denervation      Complains of left shoulder pain radicular EMG 2019 negative for radicular changes        4.  Essential tremor (G25.0)        Essential tremor going on as far back as high school.       Anxiety disorder that exacerbates the tremor, which involves his head, speech, arms, right greater than left.         Continue propranolol 10 mg tablet 2 tablets twice daily          Patient feels is helping no wheezing    For his essential tremor continue with the propranolol as above  Unclear if the vertigo/dizziness was more of an anxiety spell  Gave him some range of motion exercises for his shoulder  Primary MD working with his blood pressure    Discussed multiple issues as above  Follow-up in 1 year    Total care time today 32 minutes    As part of visit today  Reviewed laboratory data  Reviewed CT CTA head and neck 3/27/2023  Reviewed MRI scan 3/27/2023  Reviewed ER visit 3/29/2023

## 2023-08-30 ENCOUNTER — OFFICE VISIT (OUTPATIENT)
Dept: NEUROLOGY | Facility: CLINIC | Age: 83
End: 2023-08-30
Payer: COMMERCIAL

## 2023-08-30 VITALS
HEART RATE: 69 BPM | SYSTOLIC BLOOD PRESSURE: 117 MMHG | BODY MASS INDEX: 24.99 KG/M2 | WEIGHT: 150 LBS | DIASTOLIC BLOOD PRESSURE: 62 MMHG | HEIGHT: 65 IN

## 2023-08-30 DIAGNOSIS — R42 VERTIGO: ICD-10-CM

## 2023-08-30 DIAGNOSIS — G45.4 TRANSIENT GLOBAL AMNESIA: ICD-10-CM

## 2023-08-30 DIAGNOSIS — G25.0 BENIGN ESSENTIAL TREMOR: Primary | ICD-10-CM

## 2023-08-30 PROCEDURE — 99214 OFFICE O/P EST MOD 30 MIN: CPT | Performed by: PSYCHIATRY & NEUROLOGY

## 2023-08-30 RX ORDER — ASPIRIN 81 MG/1
81 TABLET ORAL DAILY
COMMUNITY

## 2023-08-30 RX ORDER — PROPRANOLOL HYDROCHLORIDE 10 MG/1
20 TABLET ORAL 2 TIMES DAILY
Qty: 360 TABLET | Refills: 3 | Status: SHIPPED | OUTPATIENT
Start: 2023-08-30 | End: 2024-08-14

## 2023-08-30 ASSESSMENT — MONTREAL COGNITIVE ASSESSMENT (MOCA)
13. ORIENTATION SUBSCORE: 6
8. SERIAL SUBTRACTION OF 7S: 2
VISUOSPATIAL/EXECUTIVE SUBSCORE: 4
10. [FLUENCY] NAME WORDS STARTING WITH DESIGNATED LETTER: 0
WHAT IS THE TOTAL SCORE (OUT OF 30): 27
7. [VIGILENCE] TAP WHEN HEARING DESIGNATED LETTER: 1
11. FOR EACH PAIR OF WORDS, WHAT CATEGORY DO THEY BELONG TO (OUT OF 2): 2
6. READ LIST OF DIGITS [FORWARD/BACKWARD]: 2
9. REPEAT EACH SENTENCE: 2
WHAT LEVEL OF EDUCATION WAS ATTAINED: 0
12. MEMORY INDEX SCORE: 5
4. NAME EACH OF THE THREE ANIMALS SHOWN: 3

## 2023-08-30 NOTE — NURSING NOTE
NICOLE COGNITIVE ASSESSMENT (MOCA)  Version 7.1 Original Version  VISUOSPATIAL/EXECUTIVE               COPY CUBE      [   1 ]                                [  0  ] DRAW CLOCK (Ten past eleven)  (3 points)    [  1  ]                    [   1 ]               [  1  ]       Contour            Numbers     Hands POINTS                  4 / 5   NAMING    [  1 ]                                                                        [  1  ]                                             [  1  ]  Linoah Wheeler                                Camel                     3/ 3   MEMORY Read list of words, subject must repeat them. Do 2 trials, even if 1st trial is successful. Do a recall after 5 minutes  FACE VELVET [employer] RICHA RED No Points    1st          2nd         ATTENTION Read list of digits (1 digit/sec) Subject has to repeat in the forward order       [  1  ]   2  1  8  5  4                                [  1  ] 7 4 2                          2/2   Read list of letters. The subject must tap with his hand at each letter A. No points if > 2 errors.  [  1  ] F B A C M N A A J K L B A F A K D E A A A J A M O F A A B              1/1   Serial 7 subtraction starting at 100          [   1 ] 93         [  1  ] 86          [   0 ] 79          [ 0   ] 72         [  0  ] 65   4 or 5 correct subtractions: 3 points,  2 or 3 correct: 2 points,  1correct: 1 point,   0 correct: 0 points           2 /3   LANGUAGE Repeat: I only know that Yang is the one to help today. [ 1    ]                                      The cat always hid under the couch when dogs were in the room. [  1 ]              2 /2   Fluency: Name maximum number of words in one minute that begin with the letter F                                                                                                                    [   0 ] _10__ (N > 11 words)               0/1   ABSTRACTION Similarity  between e.g. banana-orange=fruit                                                                   [  1  ] train-bicycle                      [ 1   ] watch-ruler             2 /2   DELAYED  RECALL Has to recall words  WITH NO CUE FACE  [  1  ] VELVET  [  1  ] Restoration  [   1 ]  RICHA  [ 1   ] RED  [ 1   ] Points for UNCUED recall only            5/5           OPTIONAL Category cue           Multiple choice cue          ORIENTATION  [  1  ] Date     [   1 ] Month       [   1 ] Year      [   1 ] Day      [   1 ] Place        [ 1   ] City          6/6   TOTAL  Normal > 26/30 Add 1 point if < 12 years education      27 /30

## 2023-08-30 NOTE — NURSING NOTE
Chief Complaint   Patient presents with    Transient global amnesia     Annual follow up. Pt states his BP and pulse have been running high, primary is working on this with him for the past 1.5 months. BP today was 117/62.  He also had a fall on Sunday, scraped his knee, did not hit head.       Ariadna Vincent LPN on 8/30/2023 at 9:53 AM

## 2023-08-30 NOTE — LETTER
"    8/30/2023         RE: Wilfrid Ortez  [address]  [place] [address]        Dear Colleague,    Thank you for referring your patient, Wilfrid Ortez, to the [facility] [place]. Please see a copy of my visit note below.    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    Primary MD working on his hypertension  Patient states that his pulse had been running \"high\"    He has some chronic right shoulder difficulty followed by other physicians I gave him some range of motion exercises below to prevent frozen shoulder    Used to play the piano he has 1 but he just does not do it much anymore  He does do a lot of crossword puzzles  His MoCA score is stable memory is okay          A.  TGA        April 1, 2021        Since last seen no further episodes of \"amnesia\"         EEG 8/23/2021, shows a little bit of mild 5-6 Hz theta disorganization on the right hemisphere rarely         EEG 2016 showed a little bit of the left theta disorganization 5-6 Hz        This is a fairly nonspecific finding prefer not to add any antiepileptic medications unless he had stereotypical episodes with a discrete onset and " offset.    MoCA  4/21/2021,        24 out of 30  8/30/2021,        27 out of 30  8/30/2023,        27 out of 30    Patient thought that his memory was maybe a little bit worse but when we did actual testing he did well seems to be stable  Talked about good routines  Talked about exercise  Talked about reasonable nutrition  Talked about good sleep cycle  Also talked about music he is a  does have a keyboard and may be doing that a little bit would help stimulate the brain        B.  Essential tremor        Head to the [place] is also left-handed and has some left hand tremor        In regards to his essential tremor he is moved into an apartment        He is not out in about quite as much        He uses propranolol 10 mg tablet, 2 tabs p.o. twice daily         Has more trouble when he is trying to eat dinner due to the action component        Talked about some of the newer treatments that are out but I do not feel this tremor is bad enough to warrant those he agreed    C.  Vertigo         patient was in the ER on June 24, 2021 with vertigo       Patient blamed it on actually missing his morning medicines that day which include Paxil but sometimes an abrupt change in dose can cause unsteadiness/dizziness       Seem to respond to treatment with meclizine and some exercises       Work-up showed a CTA with questionable changes on the intracranial vessels but there was motion artifact       Follow-up MRA showed no significant intracranial stenosis        Laboratory data reviewed    D.  Kidney stones 7/5/2022 passed them without intervention              Past neurologic history review:  April 1, 2021 had difficulty with amnesia  He had woke up in the afternoon at about 12:30 PM on 4/1/2021.  He seemed to be forgetting events of the morning.  It seemed that the memory difficulty or amnesia was transient  He was alone at the time that this was occurring  He went on to have an MRI scan of the brain that was  "unremarkable    Patient had seen a psychiatrist on March 18, 2021  Has had 8 panic attacks over 2 months.  More than usual    Patient states that on April 1, 2021 he had gotten up in the morning had breakfast  He then picked up a friend and [facility] to drive him off for an appointment  He felt awful he was driving over there  He dropped him off and then left him  When he came home he talk to his partner stated that he was not feeling well  He laid down  He then awoke later and seemed to have forgotten what had happened over at least an hour to couple hours    10 to 15 years ago he was up at [facility] with his brother he was out on a boat  He supposedly \"passed out\" in reawoke 7 PM in the evening  Did not have any recollection  His 81 mg of aspirin was increased to full aspirin possible TIA  Unclear if that was an amnestic spell    He is on some propranolol 10 mg, 2 tabs twice daily for his tremor   Does have some inhalers but does not get exacerbated by his propranolol    Formal [place] testing today was 24 out of 30    The patient does have difficulty with some head tremor, some voice tremor, and some hand tremor.    The patient notices the tremor more when he is more stressed or anxious.    He does have a past history of panic attacks, which are variable and uses lorazepam for this.        Past medical history    1. History of action tremor going on for five plus years evaluated by Dr. Michi Alanis in the past in 2016.  2. 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  3. Has panic attacks, is on lorazepam, which may give some of the fatigue.  4. Has a past note mentioning some mild memory loss.  5. Hyperlipidemia.  6. Obstructive sleep apnea going on for greater than six plus years.  7. Some visual changes with loss of vision in the left eye and droopiness of the right eye.  8.  Left carpal tunnel " syndrome  9.  History of panic attacks with dizziness and off-balance sensation  10.  Diabetes mellitus.  Hemoglobin A1c's 6.9%, 6.6%, 7.3%    Past medical history  Anxiety/panic attacks  Hyperlipidemia  Diabetes  Benign prostatic hypertrophy  Hiatal hernia  Obstructive sleep apnea  Blepharospasm      Habits  Does not smoke  Does not drink alcohol  Patient is left-handed  Goals right-handed  Goals left-handed  Mouse on computer uses the right hand      Family history  Father with heart disease  at 57 also had rheumatic fever  Mother with cancer of the throat  at 72 also had diabetes mellitus.   Brother with epilepsy, DM  No family history of tremor    Past work-up  1. MRI scan of the head December 15, 2016 small vessel changes, small encephalomalacia in the posterior body of  the corpus callosum.  2. EEG on December 15, 2016, dysrhythmia grade II read by Dr. Taylor with some 8 Hz posterior dominant rhythm, some 5 to 6 Hz theta, a little bit of left temporal slowing.    Recent work-up  MRI scan brain 2021  A.  No acute stroke mass or bleed  B.  Mild to moderate atrophy and chronic small vessel changes  C.  Mild to moderate cerebellar atrophy   laboratory data 2021  Sodium 140 potassium 4.3  BUN 18 creatinine 1.2  Glucose 107  White blood count 6.5, hemoglobin 14.5, platelets 154,000  [place] cognitive assessment score 2021,24 out of 30  Previous writing samples in the chart.    CT scan head 2021  A. No CT evidence for acute intracranial process.   B. Brain atrophy and presumed chronic microvascular ischemic changes as above.   HEAD CTA: 2021  1.  Apparent loss of contrast opacification involving a diminutive in size, proximal to mid left M2 anterior temporal division middle cerebral artery with distal reconstitution. There is motion artifact in this region and this finding could be artifactual, however, a high-grade stenosis versus short segment occlusion with distal  reconstitution are other considerations. Correlation for left MCA territory neurologic deficit is recommended consider MRI for further assessment.   2.  Short segment moderate to severe stenosis versus motion artifact of a mid right M2 ascending division middle cerebral artery.   3.  No proximal large vessel occlusion, aneurysm or high flow vascular malformation.   NECK CTA: 6/24/2021  1.  No significant stenosis or dissection.    MRI scan head 6/25/2021  A.  No acute findings.   B.  Mild age-related changes.   HEAD MRA: 6/25/2021  No stenosis/occlusion, aneurysm, or high flow vascular malformation.   NECK MRA:   No measurable stenosis or dissection.  Hemoglobin A1c 6.4% (7/6/2021)  HDL 50/, (7/6/2021)  EEG 8/23/2021, 8 Hz predominant rhythm, rare 5 to 6 Hz theta disorganization of the right hemisphere no specific epileptiform discharges  MRI had 3/27/2023  1. No acute findings. Age-related changes.   2.  Mild to moderate generalized tubal atrophy  3.  Scattered nonspecific T2 flair changes  HEAD CT: 3/27/2023  No acute intracranial process.  HEAD CTA: 3/27/2023  Normal CTA [place] [facility] [place].  NECK CTA: 3/27/2023  Normal neck CTA.      This is a visit that stroke I and his Holter was benign.  He needs a referral to cardiology        Laboratory data review                      3/2023            7/2023  NA/K            139/4.1  BUN/Cr         21/1.28  GLU              202  WBC/HGB    6.9/14.6  PLTs             144,000  HGBA1C                             6.7  HDL/LDL                             52/82        MoCA    4/21/2021,        24 out of 30  8/30/2021,        27 out of 30  8/30/2023,        27 out of 30    Exam   Review of Systems   No headache no chest pain no shortness of breath no nausea vomiting no diarrhea no fever chills    No diplopia no dysarthria no dysphagia    Has no wheezing breathing okay    Has some intermittent dizziness  No hearing loss  No tinnitus    Chronic right shoulder pain with some  "decreased range of motion  Gait is adequate but he does have a cane and 2 different walkers depending on his balance he will use 1    Does crossword puzzles to help his memory    Chronic tremor    Otherwise review of systems negative    General exam  Blood pressure 117/62, pulse 69  HEENT normal  Lungs clear no wheezing  Heart rate regular  Abdomen soft  Symmetrical pulses  No edema the feet      Neurologic exam  Alert oriented x3  Normal prosody speech  Normal naming  Normal repetition  Normal comprehension  No neglect  Memory okay in the past has had some very subtle difficulty with memory  8/30/2023,        27 out of 30    Cranials 2 through 12  No ophthalmoplegia  No nystagmus  Face symmetrical  Decreased visual acuity left eye chronic  No visual field cut  Tongue twisters okay  Hypophonic voice/slightly raspy      Parkinsonian features  Slight decrease in blink  Soft voice  Action tremor  Slight voice tremor  Not much head to titubation  Left eyelid heavier than right chronic      Upper extremities  No drift  Mild action tremor  Finger-nose okay    Mild decreased range of motion of the right shoulder I gave him some range of motion exercises    Lower extremities  Test in the seated position good strength    Gait  Gets up with his arms crossed  Ambulates back-and-forth in the room with fairly good turns  Does have an assistive device to use when his balance is not so good        Reflexes symmetrical  No Swift reflex        Assessment/Plan       1.   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        EEG 2016 mild left theta disorganization rare        EEG 2021 mild right theta disorganization rare        Spell is not frequent enough or stereotypical enough to warrant trial of medication discussed with patient    2.  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  CTS (carpal tunnel syndrome) (G56.02)        Patient is left-handed       Patient does play piano for most of his life       EMG September 11, 2019 shows moderate left CTS with active and chronic denervation      Complains of left shoulder pain radicular EMG 2019 negative for radicular changes        4.  Essential tremor (G25.0)        Essential tremor going on as far back as high school.       Anxiety disorder that exacerbates the tremor, which involves his head, speech, arms, right greater than left.         Continue propranolol 10 mg tablet 2 tablets twice daily          Patient feels is helping no wheezing    For his essential tremor continue with the propranolol as above  Unclear if the vertigo/dizziness was more of an anxiety spell  Gave him some range of motion exercises for his shoulder  Primary MD working with his blood pressure    Discussed multiple issues as above  Follow-up in 1 year    Total care time today 32 minutes    As part of visit today  Reviewed laboratory data  Reviewed CT CTA head and neck 3/27/2023  Reviewed MRI scan 3/27/2023  Reviewed ER visit 3/29/2023      Again, thank you for allowing me to participate in the care of your patient.        Sincerely,        luis Tamayo MD

## 2023-09-27 ENCOUNTER — TELEPHONE (OUTPATIENT)
Dept: PHARMACY | Facility: CLINIC | Age: 83
End: 2023-09-27
Payer: COMMERCIAL

## 2023-09-27 NOTE — TELEPHONE ENCOUNTER
MTM Referral Outreach Attempt #1:    Patient referred by [facility] insurance to complete a comprehensive medication therapy management appointment to review her medications.    No answer, I left voice message for patient to call back to schedule appointment. Please assist patient in schedule a MTM new medication review appointment in clinic or virtually depending on patient preference.    Medication therapy management scheduling line [phone].     Luly Burrows, PharmD  Medication Therapy Management Pharmacist

## 2023-10-07 ENCOUNTER — NURSE TRIAGE (OUTPATIENT)
Dept: NURSING | Facility: CLINIC | Age: 83
End: 2023-10-07
Payer: COMMERCIAL

## 2023-10-07 NOTE — TELEPHONE ENCOUNTER
Caller:   patient    Situation:   On blood pressure medication and his blood pressure is higher than normal.    Last night slept late and didn't wake up until 3pm today. At 3pm, he just took ASA and paroxetine and didn't take blood pressure med.    Last night he started to hyperventilate sp took ativan, propranolol, and blood pressure pill at 11:30 pm and went to bed and woke up later.    10/5/23  2:30am  123/88 pulse 101  12:43 am 188/101 pulse 99  1:12 am 165/121, pulse 124, left wrist, sitting    Patient had a upper arm cuff blood pressure device but it was not working.    Patient reports he may have missed some of his blood pressure medications  d/t his sleep schedule.    No symptoms at this time.    Background:  PCP: sara      Assessment  Needs to be assessed      Recommendation:  Disposition: be seen w/I 24 hrs; encouraged to be seen in  to have his BP rechecked  Reviewed care advise with patient. Reviewed with patient when he takes his medications.  Informed to call back w/ any questions or new concerns.    Caller verbalized understanding of care advice.  Caller agrees with advise/plan.        Marcin Mcbride RN, BSN  Triage Nurse Advisor      Reason for Disposition   Systolic BP  >= 180 OR Diastolic >= 110    Additional Information   Negative: Difficult to awaken or acting confused (e.g., disoriented, slurred speech)   Negative: Severe difficulty breathing (e.g., struggling for each breath, speaks in single words)   Negative: [1] Weakness of the face, arm or leg on one side of the body AND [2] new onset   Negative: [1] Numbness (i.e., loss of sensation) of the face, arm or leg on one side of the body AND [2] new onset   Negative: [1] Chest pain lasts > 5 minutes AND [2] history of heart disease  (i.e., heart attack, bypass surgery, angina, angioplasty, CHF)   Negative: [1] Chest pain AND [2] took nitrogylcerin AND [3] pain was not relieved   Negative: Sounds like a life-threatening emergency to the  triager   Negative: [1] Systolic BP  >= 160 OR Diastolic >= 100 AND [2] cardiac or neurologic symptoms (e.g., chest pain, difficulty breathing, unsteady gait, blurred vision)   Negative: [1] Pregnant > 20 weeks (or postpartum < 6 weeks) AND [2] new hand or face swelling   Negative: [1] Pregnant > 20 weeks AND [2] BP Systolic BP  >= 140 OR Diastolic >= 90   Negative: [1] Systolic BP  >= 200 OR Diastolic >= 120  AND [2] having NO cardiac or neurologic symptoms   Negative: [1] Postpartum < 6 weeks AND [2] BP Systolic BP  >= 140 OR Diastolic >= 90   Negative: [1] Systolic BP  >= 180 OR Diastolic >= 110 AND [2] missed most recent dose of blood pressure medication    Protocols used: High Blood Pressure-A-AH

## 2023-10-11 NOTE — TELEPHONE ENCOUNTER
MTM Referral Outreach Attempt #2     Patient referred by [facility] to complete a comprehensive medication therapy management appointment to review his medications.      Patient is scheduled for 10/19.    Shawna Julien, Pharm.D, [facility]  Medication Therapy Management Pharmacist

## 2023-10-19 ENCOUNTER — VIRTUAL VISIT (OUTPATIENT)
Dept: PHARMACY | Facility: CLINIC | Age: 83
End: 2023-10-19
Payer: COMMERCIAL

## 2023-10-19 DIAGNOSIS — E78.5 HYPERLIPIDEMIA, UNSPECIFIED HYPERLIPIDEMIA TYPE: ICD-10-CM

## 2023-10-19 DIAGNOSIS — F41.9 ANXIETY DISORDER, UNSPECIFIED TYPE: ICD-10-CM

## 2023-10-19 DIAGNOSIS — I10 ESSENTIAL HYPERTENSION: ICD-10-CM

## 2023-10-19 DIAGNOSIS — R42 DIZZINESS: ICD-10-CM

## 2023-10-19 DIAGNOSIS — K21.9 GASTRO-ESOPHAGEAL REFLUX DISEASE WITHOUT ESOPHAGITIS: ICD-10-CM

## 2023-10-19 DIAGNOSIS — H33.22: ICD-10-CM

## 2023-10-19 DIAGNOSIS — G47.61 PERIODIC LIMB MOVEMENT DISORDER: ICD-10-CM

## 2023-10-19 DIAGNOSIS — M25.519 SHOULDER PAIN, UNSPECIFIED CHRONICITY, UNSPECIFIED LATERALITY: ICD-10-CM

## 2023-10-19 DIAGNOSIS — E11.69 TYPE 2 DIABETES MELLITUS WITH OTHER SPECIFIED COMPLICATION, WITHOUT LONG-TERM CURRENT USE OF INSULIN (H): Primary | ICD-10-CM

## 2023-10-19 PROCEDURE — 99607 MTMS BY PHARM ADDL 15 MIN: CPT | Mod: VID

## 2023-10-19 PROCEDURE — 99605 MTMS BY PHARM NP 15 MIN: CPT | Mod: VID

## 2023-10-19 RX ORDER — MECLIZINE HYDROCHLORIDE 25 MG/1
25 TABLET ORAL 3 TIMES DAILY PRN
COMMUNITY

## 2023-10-19 RX ORDER — DORZOLAMIDE HYDROCHLORIDE AND TIMOLOL MALEATE 20; 5 MG/ML; MG/ML
1 SOLUTION/ DROPS OPHTHALMIC 2 TIMES DAILY
COMMUNITY
Start: 2023-06-21

## 2023-10-19 NOTE — LETTER
_  Medication List        Prepared on: 10/22/2023     Bring your Medication List when you go to the doctor, hospital, or   emergency room. And, share it with your family or caregivers.     Note any changes to how you take your medications.  Cross out medications when you no longer use them.    Medication How I take it Why I use it Prescriber   aspirin 81 MG EC tablet Take 81 mg by mouth daily  Heart protection  Patient Reported   dorzolamide-timolol (COSOPT) 22.3-6.8 MG/ML ophthalmic solution Place 1 drop into both eyes 2 times daily  Eye condition  Wilfrid Luque MD    latanoprost (XALATAN) 0.005 % ophthalmic solution Place 1 drop into both eyes daily  Eye condition  Wilfrid Luque MD    lisinopril-hydrochlorothiazide (ZESTORETIC) 10-12.5 MG tablet Take 1 tablet by mouth daily Blood pressure  Mayito Carlos MD   LORazepam (ATIVAN) 0.5 MG tablet TAKE 1 TABLET BY MOUTH DAILY AT BEDTIME AND ONE TABLET DAILY AS NEEDED FOR ANXIETY  Anxiety  Mayito Bernard MD   meclizine (ANTIVERT) 25 MG tablet Take 25 mg by mouth 3 times daily as needed for dizziness  Dizziness Patient Reported   omeprazole (PRILOSEC) 20 MG DR capsule Take 1 capsule (20 mg) by mouth daily Heart burn  Mayito Carlos MD   PARoxetine (PAXIL) 20 MG tablet Take 40 mg by mouth every morning  Anxiety  Mayito Bernard MD   propranolol (INDERAL) 10 MG tablet Take 2 tablets (20 mg) by mouth 2 times daily Tremor Jairo Tamayo MD   simvastatin (ZOCOR) 20 MG tablet Take 1 tablet (20 mg) by mouth At Bedtime Cholesterol  Mayito Carlos MD   UNABLE TO FIND MEDICATION NAME: Hemp infused topical salve prn  Pain Patient Reported         Add new medications, over-the-counter drugs, herbals, vitamins, or  minerals in the blank rows below.    Medication How I take it Why I use it Prescriber                                      Allergies:      ibuprofen        Side effects I have had:               Other Information:               My notes and questions:

## 2023-10-19 NOTE — PROGRESS NOTES
Medication Therapy Management (MTM) Encounter    ASSESSMENT:                            Medication Adherence/Access: No issues identified    Diabetes: would benefit from obtaining an updated A1c at upcoming PCP visit to have a better understanding of current blood sugar trends.     Hypertension: Would recommend obtaining updated BMP since starting lisinopril-hydrochlorothiazide at upcoming visit with PCP. Recommended a blood pressure goal of <140/90 and a normal pulse of . Educated patient if he is having high blood pressures or pulse with symptoms that this is when he should be seen. Right now it seems that patient's home blood pressure readings are near goal of <140/90 and pulse is overall normal thus would recommend re-checking blood pressure at upcoming PCP visit. Noted to patient that he can make a nurse only visit with the clinic if he would like his blood pressure checked before then. Pulse could also be impacted by anxiety thus would recommend continuing propranolol to help with this and also pulse.     Hyperlipidemia: Stable, LDL at/near LDL goal of <70, have option to increase simvastatin dose in the future if needed.      Tremor: At this time it has been determined that patient should continue on current dose of propranolol, additionally this can help with patient's pulse and anxiety thus feel that benefits of continuing on current dose outweigh increased fatigue.     Anxiety: discussed risks of paroxetine given anticholinergic side effects additionally discussed risks of ongoing lorazepam use including potential memory impairment and fall risk. At this time patient feels that benefits of both medications outweigh risks thus will continue on current therapy. Would benefit form looking into therapy for additional support. Continues to follow with current physiatrist.     Dizziness: Stable, no recent symptoms, has meclizine on hand as needed     GERD/History of ulcer: Stable.     Shoulder pain:  Stable.     Eye condition: Unfortunately there is nothing that patient would be able to use to help with eye drop administration. I recommended patient trying to see if there is a neighbor or service to help with administration.     PLAN:                            Recommended a blood pressure goal of <140/90 and a normal pulse of . Recommended to patient that if his blood pressure is high and he has symptoms such as chest pain, headaches, or vision changes that this is when he should seek medical attention.     Unfortunately the best thing for eye drops would be to see if someone such as a neighbor would be able to help you administer your eye drops.     Look into getting a therapist to also help with anxiety.     Follow-up: 1 year for comprehensive review.     SUBJECTIVE/OBJECTIVE:                          Murali Ortez is a 82 year old male contacted via secure video then via phone due to disconnection for an initial visit. He was referred to me from their insurance.      Reason for visit: comprehensive medication review.    Allergies/ADRs: Reviewed in chart  Past Medical History: Reviewed in chart  Tobacco: He reports that he has never smoked. He has never used smokeless tobacco.  Alcohol: not currently using    Medication Adherence/Access: Patient uses pill box(es).  Patient takes medications 2 time(s) per day. Takes morning pills at 10-11 AM and evening medications are taken around 11PM   Per patient, misses medication 0 times per week.   Medication barriers: none.     Diabetes   No current medications   Aspirin 81mg daily  Blood sugar monitoring: never     Eye exam is up to date  Foot exam: due  Urine Albumin:   Lab Results   Component Value Date    [facility]  12/05/2022      Comment:      Unable to calculate, urine albumin and/or urine creatinine is outside detectable limits.  Microalbuminuria is defined as an albumin:creatinine ratio of 17 to 299 for males and 25 to 299 for females. A ratio of  albumin:creatinine of 300 or higher is indicative of overt proteinuria.  Due to biologic variability, positive results should be confirmed by a second, first-morning random or 24-hour timed urine specimen. If there is discrepancy, a third specimen is recommended. When 2 out of 3 results are in the microalbuminuria range, this is evidence for incipient nephropathy and warrants increased efforts at glucose control, blood pressure control, and institution of therapy with an angiotensin-converting-enzyme (ACE) inhibitor (if the patient can tolerate it).        Lab Results   Component Value Date    A1C 6.7 (H) 07/03/2023     Hypertension   Propranolol 20mg twice daily (using mostly for tremor)   Lisinopril 10- hydrochlorothiazide 12.5mg daily- He was prescribed this in June of 2023.  Patient notes that he has been having problems with his blood pressure and pulse, was seen in urgent care 2 weeks ago due to elevated blood pressure but work up found no abnormal results.   Patient reports  fatigue mostly with propranolol.  Blood pressure today at 1:30 PM and reading was 140/73 P69. Patient notes that pulse is usually around 60-69s but can fluctuate. He notes that most recently his blood pressure has stabilized but sometimes his pulse is too high. Patient notes that at 12:43 AM a couple weeks ago reading was 188/101 P 99 (patient called in to nurse triage) the next day reading was 114/74 P112, then the next day reading was 124/75 P75. When he has higher pulses or blood pressure readings he denies any symptoms of chest pains or feeling of changing heart rates.      BP Readings from Last 3 Encounters:   08/30/23 117/62   07/03/23 (!) 166/94   04/03/23 (!) 174/96     Pulse Readings from Last 3 Encounters:   08/30/23 69   07/03/23 59   04/03/23 64     Hyperlipidemia :   Aspirin 81mg daily (dose was increased to full dose aspirin for a possible TIA but now has returned down to 81mg daily as recommended by PCP).   simvastatin 20mg  "daily  Patient reports no significant myalgias or other side effects.     Recent Labs   Lab Test 07/03/23  1147 12/05/22  0943   CHOL 165 155   HDL 52 51   LDL 82 72   TRIG 154* 158*     Tremor:   Propranolol 20mg twice daily- patient feels that dose is somewhat effective, he notes that he doesn't notice his tremor affecting him and performing every day tasks but other people sometimes call out his tremor. He notes that he is sleeping longer now with the dose increase, he mentioned this to Dr. Tamayo at recent follow up but it was recommended to stay on the current dose.     Anxiety:   Paroxetine 40mg daily   Lorazepam 0.5mg at night and second dose as needed during the day. He notes that he has to take an extra lorazepam dose during the day sporadically.   Patient notes that he has been taking paroxetine since 1992 and this works well for him. At this time patient feels that benefits outweigh risks with the two medications. Patient notes that one of friends frequently calls them which causes additional stress. Patient follows with a physiatrist (Dr. Edmond) for these medications and meets with them every 3 months. Has been suggested to meet with a therapist to help with anxiety.     Dizziness:   Meclizine 25mg three times daily as needed for dizziness- patient notes that he was prescribed in May for dizziness, he has some supply left over but has not needed to take. He notes at this time he was prescribed valium and after taking his symptoms improved and have not returned since.     GERD/History of ulcer:   Omeprazole 20 mg once daily   Patient reports no current symptoms.     Shoulder pain:   Patient notes that he uses a \"hemp\" cream once daily in the morning for pain which is effective.     Eye condition:   Latanoprost 1 drop into both eyes at bedtime   Cosopt 1 drop both eyes twice daily   He also gets eye injections done every 3-4 months.   He notes that he recently bought a supplement to help with eyes and " wonders if he should take this.   Patient notes that with his tremor it is difficult to administer the drops. He has considered looking into a service, assisted living, or asking a neighbor for help.  Has eye appointment on 12/6 with eye doctor for follow up.     Today's Vitals: There were no vitals taken for this visit.  ----------------  I spent 60 minutes with this patient today (an extra 15 minutes was spent creating the Medication Action Plan). A copy of the visit note was provided to the patient's provider(s).    A summary of these recommendations was sent via [employer].    Odalys Gregory, PharmD  Medication Therapy Management Pharmacist   [facility] and [facility]     Telemedicine Visit Details  Type of service:  Telephone visit  Start Time:  2:03 PM  End Time:  3:03 PM       Medication Therapy Recommendations  No medication therapy recommendations to display

## 2023-10-19 NOTE — LETTER
"Recommended To-Do List      Prepared on: 10/22/2023       You can get the best results from your medications by completing the items on this \"To-Do List.\"      Bring your To-Do List when you go to your doctor. And, share it with your family or caregivers.    My To-Do List:  What we talked about: What I should do:   Your current medications    Continue to take your medications as prescribed         What we talked about: What I should do:                "

## 2023-10-19 NOTE — LETTER
October 22, 2023  Wilfrid Ortez  [address]   [place] [address]    Dear Mr. Ortez, [facility]     Thank you for talking with me on Oct 19, 2023 about your health and medications. As a follow-up to our conversation, I have included two documents:      Your Recommended To-Do List has steps you should take to get the best results from your medications.  Your Medication List will help you keep track of your medications and how to take them.    If you want to talk about these documents, please call Arya Gregory RPH at phone: [phone], Monday-Friday 8-4:30pm.    I look forward to working with you and your doctors to make sure your medications work well for you.    Sincerely,  Arya Gregory RPH  [facility] Pharmacist, [facility]

## 2023-10-22 NOTE — PATIENT INSTRUCTIONS
"Recommendations from today's MTM visit:                                                      Recommended a blood pressure goal of <140/90 and a normal pulse of . Recommended to patient that if his blood pressure is high and he has symptoms such as chest pain, headaches, or vision changes that this is when he should seek medical attention.     Unfortunately the best thing for eye drops would be to see if someone such as a neighbor would be able to help you administer your eye drops.     Look into getting a therapist to also help with anxiety.     Follow-up: 1 year for comprehensive review.     It was great speaking with you today.  I value your experience and would be very thankful for your time in providing feedback in our clinic survey. In the next few days, you may receive an email or text message from [facility] [email or web address] with a link to a survey related to your  clinical pharmacist.\"     To schedule another MTM appointment, please call the clinic directly or you may call the MTM scheduling line at [phone] or toll-free at [phone].     My Clinical Pharmacist's contact information:                                                      Please feel free to contact me with any questions or concerns you have.      Odalys Gregory, PharmD  Medication Therapy Management Pharmacist   [facility] and [facility]    "

## 2023-11-06 ENCOUNTER — OFFICE VISIT (OUTPATIENT)
Dept: INTERNAL MEDICINE | Facility: CLINIC | Age: 83
End: 2023-11-06
Payer: COMMERCIAL

## 2023-11-06 VITALS
OXYGEN SATURATION: 97 % | SYSTOLIC BLOOD PRESSURE: 120 MMHG | HEART RATE: 76 BPM | BODY MASS INDEX: 24.73 KG/M2 | TEMPERATURE: 98.2 F | DIASTOLIC BLOOD PRESSURE: 62 MMHG | HEIGHT: 66 IN | WEIGHT: 153.9 LBS | RESPIRATION RATE: 16 BRPM

## 2023-11-06 DIAGNOSIS — F33.41 MAJOR DEPRESSIVE DISORDER, RECURRENT EPISODE, IN PARTIAL REMISSION (H): ICD-10-CM

## 2023-11-06 DIAGNOSIS — N18.31 CHRONIC KIDNEY DISEASE, STAGE 3A (H): ICD-10-CM

## 2023-11-06 DIAGNOSIS — E11.8 TYPE 2 DIABETES MELLITUS WITH COMPLICATION, WITHOUT LONG-TERM CURRENT USE OF INSULIN (H): Primary | ICD-10-CM

## 2023-11-06 LAB
CHOLEST SERPL-MCNC: 142 MG/DL
CREAT UR-MCNC: 158 MG/DL
FASTING STATUS PATIENT QL REPORTED: YES
GLUCOSE SERPL-MCNC: 151 MG/DL (ref 70–99)
HBA1C MFR BLD: 6.7 % (ref 0–5.6)
HDLC SERPL-MCNC: 47 MG/DL
LDLC SERPL CALC-MCNC: 74 MG/DL
MICROALBUMIN UR-MCNC: <12 MG/L
MICROALBUMIN/CREAT UR: NORMAL MG/G{CREAT}
NONHDLC SERPL-MCNC: 95 MG/DL
TRIGL SERPL-MCNC: 104 MG/DL

## 2023-11-06 PROCEDURE — 82043 UR ALBUMIN QUANTITATIVE: CPT | Performed by: INTERNAL MEDICINE

## 2023-11-06 PROCEDURE — 82570 ASSAY OF URINE CREATININE: CPT | Performed by: INTERNAL MEDICINE

## 2023-11-06 PROCEDURE — 83036 HEMOGLOBIN GLYCOSYLATED A1C: CPT | Performed by: INTERNAL MEDICINE

## 2023-11-06 PROCEDURE — 80061 LIPID PANEL: CPT | Performed by: INTERNAL MEDICINE

## 2023-11-06 PROCEDURE — 99214 OFFICE O/P EST MOD 30 MIN: CPT | Performed by: INTERNAL MEDICINE

## 2023-11-06 PROCEDURE — 36415 COLL VENOUS BLD VENIPUNCTURE: CPT | Performed by: INTERNAL MEDICINE

## 2023-11-06 PROCEDURE — 82947 ASSAY GLUCOSE BLOOD QUANT: CPT | Performed by: INTERNAL MEDICINE

## 2023-11-06 RX ORDER — LISINOPRIL/HYDROCHLOROTHIAZIDE 10-12.5 MG
1 TABLET ORAL DAILY
Qty: 90 TABLET | Refills: 11 | Status: SHIPPED | OUTPATIENT
Start: 2023-11-06

## 2023-11-06 NOTE — PROGRESS NOTES
"  {PROVIDER CHARTING PREFERENCE:900661}    Gaby Carrion is a 82 year old, presenting for the following health issues:  Follow Up        11/6/2023     9:23 AM   Additional Questions   Roomed by Lilly       History of Present Illness       Reason for visit:  Follow up    He eats 0-1 servings of fruits and vegetables daily.He consumes 0 sweetened beverage(s) daily.He exercises with enough effort to increase his heart rate 9 or less minutes per day.  He exercises with enough effort to increase his heart rate 3 or less days per week.   He is taking medications regularly.       {MA/LPN/RN Pre-Provider Visit Orders- hCG/UA/Strep (Optional):720719}  {SUPERLIST (Optional):875045}  {additonal problems for provider to add (Optional):046032}      Review of Systems   {ROS COMP (Optional):445926}      Objective    /62 (BP Location: Right arm, Patient Position: Sitting, Cuff Size: Adult Regular)   Pulse 76   Temp 98.2  F (36.8  C) (Oral)   Resp 16   Ht 1.676 m (5' 6\")   Wt 69.8 kg (153 lb 14.4 oz)   SpO2 97%   BMI 24.84 kg/m    Body mass index is 24.84 kg/m .  Physical Exam   {Exam List (Optional):023081}    {Diagnostic Test Results (Optional):928658}    {AMBULATORY ATTESTATION (Optional):274123}              "

## 2023-11-06 NOTE — PROGRESS NOTES
"Assessment/Plan:    Diabetes mellitus type 2 check A1c blood sugar lipid panel and urine for microalbumin today stable.    Hypertension controlled 120/62 refill lisinopril HCTZ 10/12.5 #91 daily with 11 refills.    Hyperlipidemia on statin therapy continue statin therapy same no history of MI or stroke.    Diarrhea loose.  Questionable IBS.  Suggest Metamucil plus a diet rich in bananas rice applesauce and tea.  Denies associated fever or chills or muscle aches temperature this morning 98 point no blood in stool.  No melena.      25 minutes spent on the date of the encounter doing chart review, patient visit, and documentation     Subjective:  Wilfrid Ortez is a 82 year old male presents for the following health issues wants refill of lisinopril HCTZ instead of #30.  Loose stools.  Consider irritable bowel syndrome and weight loss appears stronger.  Suggest Metamucil 1 heaping tablespoon daily.  IBS.  Denies blood in the stool or urine.  1.  No weight loss.    ROS:  No blood in stool or urine or stools.  Questionable IBS.  Medication list reviewed reconciled.  Wants 90 tablets of lisinopril HCT discussed as opposed to 30..  On examination:  Neck veins nondistended no thyromegaly no thyroid nodules no carotid bruits left or right lungs clear diminished breath sounds heart tones regular rhythm.  Patient notes heart rate is less and blood pressure better with medicines reviewed abdomen extremities free of edema cyanosis or clubbing.    Objective:  /62 (BP Location: Right arm, Patient Position: Sitting, Cuff Size: Adult Regular)   Pulse 76   Temp 98.2  F (36.8  C) (Oral)   Resp 16   Ht 1.676 m (5' 6\")   Wt 69.8 kg (153 lb 14.4 oz)   SpO2 97%   BMI 24.84 kg/m    Examination is as above.    Mayito Carlos MD  Internal Medicine    Answers submitted by the patient for this visit:  General Questionnaire (Submitted on 11/6/2023)  Chief Complaint: Chronic problems general questions HPI Form  What is the " reason for your visit today? : follow up  How many servings of fruits and vegetables do you eat daily?: 0-1  On average, how many sweetened beverages do you drink each day (Examples: soda, juice, sweet tea, etc.  Do NOT count diet or artificially sweetened beverages)?: 0  How many minutes a day do you exercise enough to make your heart beat faster?: 9 or less  How many days a week do you exercise enough to make your heart beat faster?: 3 or less  How many days per week do you miss taking your medication?: 0

## 2023-11-18 ENCOUNTER — HEALTH MAINTENANCE LETTER (OUTPATIENT)
Age: 83
End: 2023-11-18

## 2023-12-05 DIAGNOSIS — E11.8 TYPE 2 DIABETES MELLITUS WITH COMPLICATION, WITHOUT LONG-TERM CURRENT USE OF INSULIN (H): ICD-10-CM

## 2023-12-05 RX ORDER — LISINOPRIL/HYDROCHLOROTHIAZIDE 10-12.5 MG
1 TABLET ORAL DAILY
Qty: 90 TABLET | Refills: 11 | OUTPATIENT
Start: 2023-12-05

## 2024-01-04 ENCOUNTER — TRANSFERRED RECORDS (OUTPATIENT)
Dept: MULTI SPECIALTY CLINIC | Facility: CLINIC | Age: 84
End: 2024-01-04

## 2024-01-04 LAB — RETINOPATHY: NORMAL

## 2024-01-20 ENCOUNTER — NURSE TRIAGE (OUTPATIENT)
Dept: NURSING | Facility: CLINIC | Age: 84
End: 2024-01-20
Payer: COMMERCIAL

## 2024-01-20 NOTE — TELEPHONE ENCOUNTER
Nurse Triage SBAR    Is this a 2nd Level Triage? NO    Situation: vertigo    Background: history of vertigo had been prescribed diazepam in past but now prescribed meclizine.  History of panic attacks. Takes hemp cream for shoulder and neck pain, which helps relieve pain.  History of type 2 Diabetes, hasn't taken his blood sugar today.  Patient is under a lot stress. Had been hospitalized as recent as 12/31/23 at [facility] for leg pain.  Has a PCA.  Is being seen for vision changes. Patient sleeps a lot.    Assessment: can't turn head to the left due to causing dizziness.  Denies vomiting, fever, or difficulty breathing.  152/85, pulse 79.  Patient states that he has been eating a lot of gummy bears recently and he is not checking his blood sugars and he spends the majority of his time in bed, that he doesn't get much activity.  He states that his urine is dark in color.  Patient states that the vertigo is now gone after he went and laid down  Blood sugar was 169. Drinks 2-3 bottles of water per day.      Protocol Recommended Disposition:   See PCP Within 24 Hours    Recommendation: Recommend patient be seen within the next 24 hours.  Patient is at higher risk for strokes so it is recommended that the patient be seen at an emergency room.  Patient verbalized understanding the information.  Dr. Burnett was paged and he recommended that the patient can monitor at home at this time but if the dizziness returns or if he has any other symptoms, than he needs to be seen.  Patient was advised of Dr. Burnett' recommendations and had further questions regarding using hemp ointment, that is not prescribed, and it is recommended that the patient contact a pharmacist.  Patient verbalized understanding all the information and had no further questions.         Does the patient meet one of the following criteria for ADS visit consideration? 16+ years old, with an [facility] PCP     TIP  Providers, please consider if this condition is  appropriate for management at one of our Acute and Diagnostic Services sites.     If patient is a good candidate, please use dotphrase <dot>triageresponse and select Refer to ADS to document.    Reason for Disposition   [1] NO dizziness now AND [2] one or more stroke risk factors (i.e., hypertension, diabetes, prior stroke/TIA/heart attack)   [1] Caller has medicine question about med NOT prescribed by PCP AND [2] triager unable to answer question (e.g., compatibility with other med, storage)    Additional Information   Negative: [1] Weakness (i.e., paralysis, loss of muscle strength) of the face, arm or leg on one side of the body AND [2] sudden onset AND [3] present now   Negative: [1] Numbness (i.e., loss of sensation) of the face, arm or leg on one side of the body AND [2] sudden onset AND [3] present now   Negative: [1] Loss of speech or garbled speech AND [2] sudden onset AND [3] present now   Negative: Difficult to awaken or acting confused (e.g., disoriented, slurred speech)   Negative: Sounds like a life-threatening emergency to the triager   Negative: SEVERE dizziness (vertigo) (e.g., unable to walk without assistance)   Negative: Severe headache (e.g., excruciating)   (Exception: Similar to previous migraines.)   Negative: [1] Dizziness (vertigo) present now AND [2] one or more STROKE RISK FACTORS (i.e., hypertension, diabetes, prior stroke/TIA, heart attack)  (Exception: Prior doctor or NP/PA evaluation for this AND no different/worse than usual.)   Negative: [1] Dizziness (vertigo) present now AND [2] age > 59   (Exception: Prior doctor or NP/PA evaluation for this AND no different/worse than usual.)   Negative: [1] Weakness (i.e., paralysis, loss of muscle strength) of the face, arm / hand, or leg / foot on one side of the body AND [2] sudden onset AND [3] brief (now gone)   Negative: [1] Numbness (i.e., loss of sensation) of the face, arm / hand, or leg / foot on one side of the body AND [2] sudden  onset AND [3] brief (now gone)   Negative: [1] Loss of speech or garbled speech AND [2] sudden onset AND [3] brief (now gone)   Negative: Loss of vision or double vision  (Exception: Similar to previous migraines.)   Negative: Patient sounds very sick or weak to the triager   Negative: Taking a medicine that could cause dizziness (e.g., phenytoin [Dilantin], carbamazepine [Tegretol], primidone [Mysoline])   Negative: [1] MODERATE dizziness (e.g., vertigo; feels very unsteady, interferes with normal activities) AND [2] has NOT been evaluated by doctor (or NP/PA) for this   Negative: [1] Intentional drug overdose AND [2] suicidal thoughts or ideas   Negative: MORE THAN A DOUBLE DOSE of a prescription or over-the-counter (OTC) drug   Negative: [1] DOUBLE DOSE (an extra dose or lesser amount) of prescription drug AND [2] any symptoms (e.g., dizziness, nausea, pain, sleepiness)   Negative: [1] DOUBLE DOSE (an extra dose or lesser amount) of over-the-counter (OTC) drug AND [2] any symptoms (e.g., dizziness, nausea, pain, sleepiness)   Negative: Took another person's prescription drug   Negative: [1] DOUBLE DOSE (an extra dose or lesser amount) of prescription drug AND [2] NO symptoms  (Exception: A double dose of antibiotics.)   Negative: Diabetes drug error or overdose (e.g., took wrong type of insulin or took extra dose)   Negative: [1] Prescription not at pharmacy AND [2] was prescribed by PCP recently (Exception: Triager has access to EMR and prescription is recorded there. Go to Home Care and confirm for pharmacy.)   Negative: [1] Pharmacy calling with prescription question AND [2] triager unable to answer question   Negative: [1] Caller has URGENT medicine question about med that PCP or specialist prescribed AND [2] triager unable to answer question   Negative: Medicine patch causing local rash or itching    Protocols used: Dizziness - Vertigo-A-AH, Medication Question Call-A-AH

## 2024-01-24 ENCOUNTER — NURSE TRIAGE (OUTPATIENT)
Dept: NURSING | Facility: CLINIC | Age: 84
End: 2024-01-24
Payer: COMMERCIAL

## 2024-01-24 ENCOUNTER — TELEPHONE (OUTPATIENT)
Dept: INTERNAL MEDICINE | Facility: CLINIC | Age: 84
End: 2024-01-24
Payer: COMMERCIAL

## 2024-01-24 NOTE — TELEPHONE ENCOUNTER
"Nurse Triage SBAR    Is this a 2nd Level Triage? YES, LICENSED PRACTITIONER REVIEW IS REQUIRED    Situation: lightheadedness, panic attacks, neck pain shooting down arms, double vision when he wears his glasses    Background:   Patient has been having dizziness and was prescribed meclizine.  Patient reports that yesterday he had a panic attack. Patient felt it coming on so he pulled his car over, took lorazepam, and was helped by people near by. Patient got a ride home and has been in bed ever since.   This morning his blood sugar was 143.  The patient did not have dizziness when he first got up but it came on when he was in the bathroom. The dizziness comes and goes but when it happens it can be more severe. Denies spinning or tilting. Dizziness is a lightheaded feeling. Panic attacks and lightheadedness both seem to show up at the same time. Patient has spoken with his psychiatrist about this and was told that the attacks are coming too often.   Blood pressure has been stable.   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     This morning patient had numbness in his right fingers and rubbing his fingers resolved the numbness. Patient sleeps on his right side with his right hand under his ear. The night before last patient had an episode of rapid heart rate for about 4-5 minutes.   Last week patient had an episode of vision loss while watching TV. Patient also notes that with his glasses he has double vision. Patient has stopped wearing his glasses recently. Last appointment with the eye doctor was 1/4 and everything was fine.      Patient reports that his third cervical vertebra is painful The pain goes from his shoulders down to his wrists. Patient is treating with hemp cream for the last 1.5 years. Pain can be severe causing him to scream. The pain is sporadic. Denies weakness or numbness in his arms or hands.  Patient had a spine fusion in his neck about 3 years ago that \"didn't take\".     Shortly " after [place], patient had been on his feet a lot, he was walking into his bedroom and had a sharp pain from his right side all the way down to his foot. Patient had someone rub it and it subsided. The next day he presented to the ED as the pain returned. Patient has an appointment on 2/7 with [facility] to discuss this.     Patient also concerned he has neuropathy in his right foot. His right foot is painful with a burning sensation.     Assessment: ED    Protocol Recommended Disposition:   See in Office Today, Go To ED/UCC Now (Or To Office With PCP Approval)    Recommendation: disposition     Routed to provider    Does the patient meet one of the following criteria for ADS visit consideration? 16+ years old, with an [facility] PCP     TIP  Providers, please consider if this condition is appropriate for management at one of our Acute and Diagnostic Services sites.     If patient is a good candidate, please use dotphrase <dot>triageresponse and select Refer to ADS to document.          Reason for Disposition   Loss of vision or double vision  (Exception: Similar to previous migraines.)   Patient sounds very upset or troubled to the triager    Additional Information   Negative: SEVERE difficulty breathing (e.g., struggling for each breath, speaks in single words)   Negative: Bluish (or gray) lips or face   Negative: Difficult to awaken or acting confused (e.g., disoriented, slurred speech)   Negative: Hysterical or combative behavior   Negative: Sounds like a life-threatening emergency to the triager   Negative: Chest pain   Negative: Palpitations, skipped heartbeat, or rapid heartbeat is main symptom   Negative: Cough is main symptom   Negative: Suicide thoughts, threats, attempts, or questions   Negative: Depression is main problem or symptom (e.g., feelings of sadness or hopelessness)   Negative: Difficulty breathing and persists > 10 minutes and not relieved by reassurance provided by triager   Negative:  Lightheadedness or dizziness and persists > 10 minutes and not relieved by reassurance provided by triager   Negative: SEVERE anxiety (e.g., extremely anxious with intense emotional symptoms such as feeling of unreality, urge to flee, unable to calm down; unable to cope or function), which is not better after 10 minutes of reassurance and Care Advice   Negative: Panic attack symptoms (e.g., sudden onset of intense fear and symptoms such as dizziness, feeling of impending doom or fear of dying, hyperventilation, numbness or tingling, sweating, trembling), and has not been evaluated for this by doctor (or NP/PA)   Negative: Panic attack symptoms (diagnosed in the past) that is not better with usual treatment, reassurance, or Care Advice   Negative: SEVERE difficulty breathing (e.g., struggling for each breath, speaks in single words)   Negative: Shock suspected (e.g., cold/pale/clammy skin, too weak to stand, low BP, rapid pulse)   Negative: Difficult to awaken or acting confused (e.g., disoriented, slurred speech)   Negative: Fainted, and still feels dizzy afterwards   Negative: Overdose (accidental or intentional) of medications   Negative: New neurologic deficit that is present now: * Weakness of the face, arm, or leg on one side of the body * Numbness of the face, arm, or leg on one side of the body * Loss of speech or garbled speech   Negative: Heart beating < 50 beats per minute OR > 140 beats per minute   Negative: Sounds like a life-threatening emergency to the triager   Negative: Chest pain   Negative: Rectal bleeding, bloody stool, or tarry-black stool   Negative: Vomiting is main symptom   Negative: Diarrhea is main symptom   Negative: Headache is main symptom   Negative: Heat exhaustion suspected (i.e., dehydration from heat exposure)   Negative: Patient states that they are having an anxiety or panic attack   Negative: Dizziness from low blood sugar (i.e., < 60 mg/dl or 3.5 mmol/l)   Negative: SEVERE  dizziness (e.g., unable to stand, requires support to walk, feels like passing out now)   Negative: SEVERE headache or neck pain   Negative: Spinning or tilting sensation (vertigo) present now and one or more stroke risk factors (i.e., hypertension, diabetes mellitus, prior stroke/TIA, heart attack, age over 60) (Exception: Prior physician evaluation for this AND no different/worse than usual.)   Negative: Neurologic deficit that was brief (now gone), ANY of the following:* Weakness of the face, arm, or leg on one side of the body* Numbness of the face, arm, or leg on one side of the body* Loss of speech or garbled speech   Negative: Alcohol or drug use, known or suspected, and feeling very shaky (i.e., visible tremors of hands)   Negative: Patient sounds very sick or weak to the triager    Protocols used: Dizziness-A-OH, Anxiety and Panic Attack-A-OH

## 2024-01-24 NOTE — TELEPHONE ENCOUNTER
Patient calling to let the provider know for the last 2 weeks been having vertigo     Meclizine taken and it doesn't seem to go away     He felt better the other day so he decided to drive and he got dizzy, he had to pull over and he had a panic attack    He was able to get someone to get help and they ended up bringing him home     any recommendations he can be prescribed     He was given diazepam one time, he is out of it though, it was a small amount    He is looking for anything to end this     he is under a lot of stress at he moment but can't go anywhere because he is dizzy all time     Contact Information  [phone] (Mobile)

## 2024-01-29 ENCOUNTER — VIRTUAL VISIT (OUTPATIENT)
Dept: INTERNAL MEDICINE | Facility: CLINIC | Age: 84
End: 2024-01-29
Payer: COMMERCIAL

## 2024-01-29 DIAGNOSIS — E08.49 OTHER DIABETIC NEUROLOGICAL COMPLICATION ASSOCIATED WITH DIABETES MELLITUS DUE TO UNDERLYING CONDITION (H): Primary | ICD-10-CM

## 2024-01-29 PROCEDURE — 99441 PR PHYSICIAN TELEPHONE EVALUATION 5-10 MIN: CPT | Mod: 93 | Performed by: INTERNAL MEDICINE

## 2024-01-29 RX ORDER — GABAPENTIN 300 MG/1
300 CAPSULE ORAL 2 TIMES DAILY PRN
Qty: 60 CAPSULE | Refills: 11 | Status: SHIPPED | OUTPATIENT
Start: 2024-01-29 | End: 2024-09-05

## 2024-01-29 NOTE — PROGRESS NOTES
Wilfrid Ortez is a 83 year oldwho is being evaluated via a billable telephone visit.       What phone number would you like to be contacted at? [phone]      Assessment & Plan  Painful diabetic neuropathy.  Previously used Neurontin.  Try gabapentin 300 mg formulation 1 twice daily caution regarding excessive daytime sleepiness.  Patient is no longer driving a car.  15 minutes spent on the date of the encounter doing chart review, patient visit and documentation I spoke with the patient directly on the phone 8 minutes.       Subjective  Painful diabetic neuropathy right lower extremity.  Some extension also on the left side rule out sciatica.  Discussed with patient may be related to lumbar radiculopathy as opposed to strictly speaking diabetic peripheral neuropathy.  Previously used gabapentin 600 mg in a day.  Discussed off label use of Neurontin and gabapentin for diabetic neuropathy.  Caution patient also regarding daytime sleepiness when used.  No longer driving a car.  Had a panic attack last week driving a car.  Took himself off the road.     Review of Systems   No blood in stool or urine med list reviewed reconciled in the chart.  Denies chest pain shortness of breath.       Mayito Carlos MD

## 2024-02-06 ENCOUNTER — OFFICE VISIT (OUTPATIENT)
Dept: INTERNAL MEDICINE | Facility: CLINIC | Age: 84
End: 2024-02-06
Payer: COMMERCIAL

## 2024-02-06 VITALS
TEMPERATURE: 98.2 F | HEART RATE: 71 BPM | HEIGHT: 66 IN | WEIGHT: 153.2 LBS | SYSTOLIC BLOOD PRESSURE: 122 MMHG | OXYGEN SATURATION: 98 % | DIASTOLIC BLOOD PRESSURE: 64 MMHG | RESPIRATION RATE: 16 BRPM | BODY MASS INDEX: 24.62 KG/M2

## 2024-02-06 DIAGNOSIS — Z12.5 SCREENING FOR PROSTATE CANCER: ICD-10-CM

## 2024-02-06 DIAGNOSIS — E11.8 TYPE 2 DIABETES MELLITUS WITH COMPLICATION, WITHOUT LONG-TERM CURRENT USE OF INSULIN (H): ICD-10-CM

## 2024-02-06 DIAGNOSIS — F33.41 MAJOR DEPRESSIVE DISORDER, RECURRENT EPISODE, IN PARTIAL REMISSION (H): ICD-10-CM

## 2024-02-06 DIAGNOSIS — Z00.00 ROUTINE GENERAL MEDICAL EXAMINATION AT A HEALTH CARE FACILITY: Primary | ICD-10-CM

## 2024-02-06 DIAGNOSIS — N18.31 CHRONIC KIDNEY DISEASE, STAGE 3A (H): ICD-10-CM

## 2024-02-06 LAB
ALBUMIN SERPL BCG-MCNC: 4.7 G/DL (ref 3.5–5.2)
ALBUMIN UR-MCNC: NEGATIVE MG/DL
ALP SERPL-CCNC: 78 U/L (ref 40–150)
ALT SERPL W P-5'-P-CCNC: 13 U/L (ref 0–70)
ANION GAP SERPL CALCULATED.3IONS-SCNC: 11 MMOL/L (ref 7–15)
APPEARANCE UR: CLEAR
AST SERPL W P-5'-P-CCNC: 20 U/L (ref 0–45)
BACTERIA #/AREA URNS HPF: ABNORMAL /HPF
BILIRUB SERPL-MCNC: 0.5 MG/DL
BILIRUB UR QL STRIP: NEGATIVE
BUN SERPL-MCNC: 20.4 MG/DL (ref 8–23)
CALCIUM SERPL-MCNC: 7.9 MG/DL (ref 8.8–10.2)
CHLORIDE SERPL-SCNC: 99 MMOL/L (ref 98–107)
CHOLEST SERPL-MCNC: 185 MG/DL
COLOR UR AUTO: YELLOW
CREAT SERPL-MCNC: 1.36 MG/DL (ref 0.67–1.17)
CREAT UR-MCNC: 86.1 MG/DL
DEPRECATED HCO3 PLAS-SCNC: 29 MMOL/L (ref 22–29)
EGFRCR SERPLBLD CKD-EPI 2021: 52 ML/MIN/1.73M2
ERYTHROCYTE [DISTWIDTH] IN BLOOD BY AUTOMATED COUNT: 12.9 % (ref 10–15)
FASTING STATUS PATIENT QL REPORTED: ABNORMAL
GLUCOSE SERPL-MCNC: 137 MG/DL (ref 70–99)
GLUCOSE UR STRIP-MCNC: 100 MG/DL
HBA1C MFR BLD: 6.9 % (ref 0–5.6)
HCT VFR BLD AUTO: 41.5 % (ref 40–53)
HDLC SERPL-MCNC: 52 MG/DL
HGB BLD-MCNC: 14.4 G/DL (ref 13.3–17.7)
HGB UR QL STRIP: NEGATIVE
KETONES UR STRIP-MCNC: NEGATIVE MG/DL
LDLC SERPL CALC-MCNC: 101 MG/DL
LEUKOCYTE ESTERASE UR QL STRIP: ABNORMAL
MCH RBC QN AUTO: 30.4 PG (ref 26.5–33)
MCHC RBC AUTO-ENTMCNC: 34.7 G/DL (ref 31.5–36.5)
MCV RBC AUTO: 88 FL (ref 78–100)
MICROALBUMIN UR-MCNC: <12 MG/L
MICROALBUMIN/CREAT UR: NORMAL MG/G{CREAT}
NITRATE UR QL: NEGATIVE
NONHDLC SERPL-MCNC: 133 MG/DL
PH UR STRIP: 6 [PH] (ref 5–8)
PLATELET # BLD AUTO: 181 10E3/UL (ref 150–450)
POTASSIUM SERPL-SCNC: 4.7 MMOL/L (ref 3.4–5.3)
PROT SERPL-MCNC: 7.7 G/DL (ref 6.4–8.3)
PSA SERPL DL<=0.01 NG/ML-MCNC: 7.05 NG/ML
RBC # BLD AUTO: 4.74 10E6/UL (ref 4.4–5.9)
RBC #/AREA URNS AUTO: ABNORMAL /HPF
SODIUM SERPL-SCNC: 139 MMOL/L (ref 135–145)
SP GR UR STRIP: 1.01 (ref 1–1.03)
SQUAMOUS #/AREA URNS AUTO: ABNORMAL /LPF
TRIGL SERPL-MCNC: 158 MG/DL
TSH SERPL DL<=0.005 MIU/L-ACNC: 4.61 UIU/ML (ref 0.3–4.2)
UROBILINOGEN UR STRIP-ACNC: 0.2 E.U./DL
WBC # BLD AUTO: 8.8 10E3/UL (ref 4–11)
WBC #/AREA URNS AUTO: ABNORMAL /HPF

## 2024-02-06 PROCEDURE — 83036 HEMOGLOBIN GLYCOSYLATED A1C: CPT | Performed by: INTERNAL MEDICINE

## 2024-02-06 PROCEDURE — G0103 PSA SCREENING: HCPCS | Performed by: INTERNAL MEDICINE

## 2024-02-06 PROCEDURE — 80061 LIPID PANEL: CPT | Performed by: INTERNAL MEDICINE

## 2024-02-06 PROCEDURE — 36415 COLL VENOUS BLD VENIPUNCTURE: CPT | Performed by: INTERNAL MEDICINE

## 2024-02-06 PROCEDURE — 82043 UR ALBUMIN QUANTITATIVE: CPT | Performed by: INTERNAL MEDICINE

## 2024-02-06 PROCEDURE — 80053 COMPREHEN METABOLIC PANEL: CPT | Performed by: INTERNAL MEDICINE

## 2024-02-06 PROCEDURE — 99213 OFFICE O/P EST LOW 20 MIN: CPT | Mod: 25 | Performed by: INTERNAL MEDICINE

## 2024-02-06 PROCEDURE — G0439 PPPS, SUBSEQ VISIT: HCPCS | Performed by: INTERNAL MEDICINE

## 2024-02-06 PROCEDURE — 81001 URINALYSIS AUTO W/SCOPE: CPT | Performed by: INTERNAL MEDICINE

## 2024-02-06 PROCEDURE — 84443 ASSAY THYROID STIM HORMONE: CPT | Performed by: INTERNAL MEDICINE

## 2024-02-06 PROCEDURE — 85027 COMPLETE CBC AUTOMATED: CPT | Performed by: INTERNAL MEDICINE

## 2024-02-06 PROCEDURE — 82570 ASSAY OF URINE CREATININE: CPT | Performed by: INTERNAL MEDICINE

## 2024-02-06 RX ORDER — KETOROLAC TROMETHAMINE 5 MG/ML
SOLUTION OPHTHALMIC
COMMUNITY
Start: 2023-12-07 | End: 2024-08-14

## 2024-02-06 RX ORDER — RESPIRATORY SYNCYTIAL VIRUS VACCINE 120MCG/0.5
0.5 KIT INTRAMUSCULAR ONCE
Qty: 1 EACH | Refills: 0 | Status: CANCELLED | OUTPATIENT
Start: 2024-02-06 | End: 2024-02-06

## 2024-02-06 SDOH — HEALTH STABILITY: PHYSICAL HEALTH: ON AVERAGE, HOW MANY DAYS PER WEEK DO YOU ENGAGE IN MODERATE TO STRENUOUS EXERCISE (LIKE A BRISK WALK)?: 0 DAYS

## 2024-02-06 ASSESSMENT — PATIENT HEALTH QUESTIONNAIRE - PHQ9
10. IF YOU CHECKED OFF ANY PROBLEMS, HOW DIFFICULT HAVE THESE PROBLEMS MADE IT FOR YOU TO DO YOUR WORK, TAKE CARE OF THINGS AT HOME, OR GET ALONG WITH OTHER PEOPLE: SOMEWHAT DIFFICULT
SUM OF ALL RESPONSES TO PHQ QUESTIONS 1-9: 10
SUM OF ALL RESPONSES TO PHQ QUESTIONS 1-9: 10

## 2024-02-06 ASSESSMENT — SOCIAL DETERMINANTS OF HEALTH (SDOH): HOW OFTEN DO YOU GET TOGETHER WITH FRIENDS OR RELATIVES?: TWICE A WEEK

## 2024-02-06 NOTE — PROGRESS NOTES
Annual Wellness Visit:  Wilfrid Ortez  is a 83 year old male  who presents for an annual wellness visit.  Annual wellness visit and physical examination screen for prostate cancer.  PSA plus DANIEL.    We had a good discussion today physician and patient sharing was accomplished.    We did discuss his peripheral neuropathy a sensation of vibratory sense sometimes painful left lower extremity.  Patient reassured already on gabapentin continue same    Position and patient sharing was accomplished.  I do not prescribe this patient any opioids and the patient does not receive opioids from any other individual provider.  The patient's provider list includes only myself MECHE GOTTI MD as his primary care general internist.  Cognitive assessment was done the patient was able to draw successfully the face of an analog clock plus remember 3 items.  Labs ordered today include the following hemogram comprehensive metabolic profile A1c urine for microalbumin lipid panel PSA TSH urinalysis.  Emotional mental health was assessed the patient has anxiety depression chronic panic attacks he is under the care of a psychiatrist Dr. Jessica at he has a personal care attendant time who helps him and his his personal care attendant lives with him most of the time.  Functional capacity ADL's and safety in the home was assessed because of his peripheral neuropathy and anxiety with panic attacks he may be a candidate for assisted living we did discuss this pending lab test today.    Advance care planning done.    Falls risk assessment also accomplished.    Cognitive assessment was completed and the current provider this examiner and patient sharing was also completed.  Assessment/Plan:  Annual wellness visit and physical examination.  Prior history of type 2 diabetes A1c urine for microalbumin will be checked along with a lipid panel.  Today the blood sugar was 137 at home.    Subjective:   Medical History:    Non-smoker    No alcohol.    Allergy  ibuprofen perhaps.    Prior surgeries includes a right total hip arthroplasty.  Medical illnesses have include as listed in the problem list plus type 2 diabetes with peripheral neuropathy as described above on gabapentin.  Balance has been affected he is at risk for falls and may be a candidate for assisted care living nursing home placement.  Pending today's labs also prior history of hypertension hyperlipidemia but no history of MI or stroke but listed is chronic kidney disease.  Stage determinant unknown.    Anxiety panic attacks for most of his life that were disabling and medically disabled from work because of that.  Followed by Dr. 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     Sleep apnea     CPAP, hasn't used in a year     Current Outpatient Medications   Medication    aspirin 81 MG EC tablet    blood glucose (NO BRAND SPECIFIED) test strip    dorzolamide-timolol (COSOPT) 22.3-6.8 MG/ML ophthalmic solution    gabapentin (NEURONTIN) 300 MG capsule    ketorolac (ACULAR) 0.5 % ophthalmic solution    latanoprost (XALATAN) 0.005 % ophthalmic solution    lisinopril-hydrochlorothiazide (ZESTORETIC) 10-12.5 MG tablet    LORazepam (ATIVAN) 0.5 MG tablet    meclizine (ANTIVERT) 25 MG tablet    omeprazole (PRILOSEC) 20 MG DR capsule    PARoxetine (PAXIL) 20 MG tablet    propranolol (INDERAL) 10 MG tablet    simvastatin (ZOCOR) 20 MG tablet    UNABLE TO FIND     No current facility-administered medications for this visit.     Immunization History   Administered Date(s) Administered    COVID-19 12+ (2023-24) (MODERNA) 11/29/2023    COVID-19 Bivalent 12+  "(Pfizer) 10/11/2022    COVID-19 MONOVALENT 12+ (Pfizer) 2021, 2021, 10/25/2021    DT (PEDS <7y) 2004    Flu, Unspecified 10/22/2007, 10/20/2008, 09/15/2009, 2010, 10/21/2011, 12/10/2012, 2013, 2014    Influenza (H1N1) 01/15/2010    Influenza (High Dose) 3 valent vaccine 10/27/2015, 2016, 2018, 10/30/2018, 10/18/2019    Influenza (IIV3) PF 2005, 10/22/2007, 10/20/2008, 09/15/2009, 10/21/2011, 12/10/2012, 2013, 2014    Influenza Vaccine 65+ (FLUAD) 2020, 2023    Influenza Vaccine 65+ (Fluzone HD) 2021, 10/11/2022    Influenza, seasonal, injectable, PF 2010    Pneumo Conj 13-V (2010&after) 2015    Pneumococcal 23 valent 10/20/2008    TDAP Vaccine (Boostrix) 2014    Td (Adult), Adsorbed 2004, 2014    Tdap (Adult) Unspecified Formulation 2004, 2014    Zoster recombinant adjuvanted (SHINGRIX) 2021, 04/10/2021    Zoster vaccine, live 2012       Surgical History:  Past Surgical History:   Procedure Laterality Date    TRANSRECTAL ULTRASONIC, TRANSURETHRAL RESECTION (TUR) OF PROSTATE CYST      ZC TOTAL HIP ARTHROPLASTY Right 2021    Procedure: RIGHT TOTAL HIP ARTHROPLASTY;  Surgeon: Frandy Clinton MD;  Location: [facility];  Service: Orthopedics        Family History:  The patient was  once  once his late wife is passed on 2 years ago COVID illness.  Mother and father both are  mother  of cancer of the throat age 82 father  of myocardial infarction prematurely at age 57.  No children.    Social History:  Personal care attendant time is with him today and is supportive.  Helped with daily cares and balance upon transfers and examination.    Health Maintenances:  Immunizations vaccines reviewed and up-to-date.    Objective:  /64   Pulse 71   Temp 98.2  F (36.8  C) (Oral)   Resp 16   Ht 1.676 m (5' 6\")   Wt 69.5 kg (153 lb 3.2 oz)   " SpO2 98%   BMI 24.73 kg/m    Appears stated age she is unsteady in his gait and required assistance from his personal care attendant as well as this examiner and transferring from chair to exam table and vice versa as well as during the genital and rectal exam.  Peripheral neuropathy secondary to type 2 diabetes impaired proprioception.    Chest was clear posteriorly the heart tones revealed a regular rhythm there was a soft systolic murmur grade 1/6 at the base not discussed no diastolic component no S3 the rhythm was regular the neck veins were nondistended there was no thyromegaly no thyroid nodules no lymphadenopathy appreciated lymph bearing areas he is pale in color not in acute distress or toxic his abdomen was benign there is no central acrocyanosis noted the genital examination was negative the testicular exam was normal scrotal contents normal no groin hernias the rectal examination showed a slight enlargement of the prostate gland without nodularity induration nothing to suggest malignancy the rest the rectal exam was negative no masses brown stool was present extremities are free of edema cyanosis or clubbing his gait was very unsteady he required my assistance as well as his personal care assistant Jairo who is very helpful.  No falls during examination.  He may be a candidate for assisted living or nursing home placement.  Will await the results from today's laboratory testing.    Mayito Carlos MD    Internal Medicine  Answers submitted by the patient for this visit:  Patient Health Questionnaire (Submitted on 2/6/2024)  If you checked off any problems, how difficult have these problems made it for you to do your work, take care of things at home, or get along with other people?: Somewhat difficult  PHQ9 TOTAL SCORE: 10

## 2024-02-06 NOTE — PROGRESS NOTES
Preventive Care Visit  [facility]  Mayito Carlos MD, Internal Medicine  Feb 6, 2024  {Provider  Link to SmartSet :161250}  {PROVIDER CHARTING PREFERENCE:796078}    Gaby Carrion is a 83 year old, presenting for the following:  Annual Visit        2/6/2024     9:06 AM   Additional Questions   Roomed by Lilly   Accompanied by luis caregiver     {ROOMER if patient is in their first year of Medicare a vision screen is required click here to document the Vison screen and then refresh the note to pull in results  :741681}    Via the Health Maintenance questionnaire, the patient has reported the following services have been completed -Eye Exam, this information has been sent to the abstraction team.  Health Care Directive  Patient does not have a Health Care Directive or Living Will: {ADVANCE_DIRECTIVE_STATUS:818734}    HPI  ***  {MA/LPN/RN Pre-Provider Visit Orders- hCG/UA/Strep (Optional):441637}  {SUPERLIST (Optional):980277}  {additonal problems for provider to add (Optional):842206}      2/6/2024   General Health   How would you rate your overall physical health? (!) FAIR   Feel stress (tense, anxious, or unable to sleep) Not at all         2/6/2024   Nutrition   Diet: Low salt    Diabetic         2/6/2024   Exercise   Days per week of moderate/strenous exercise 0 days   (!) EXERCISE CONCERN      2/6/2024   Social Factors   Frequency of gathering with friends or relatives Twice a week   Worry food won't last until get money to buy more No   Food not last or not have enough money for food? No   Do you have housing?  Yes   Are you worried about losing your housing? No   Lack of transportation? No   Unable to get utilities (heat,electricity)? No         2/6/2024   Fall Risk   Fallen 2 or more times in the past year? Yes   Trouble with walking or balance? Yes   Gait Speed Test (Document in seconds) 4.21   Gait Speed Test Interpretation Less than or equal to 5.00 seconds - PASS           2/6/2024   Activities of Daily Living- Home Safety   Needs help with the following daily activites Telephone use    Transportation    Housework    Laundry    Medication administration   Safety concerns in the home None of the above         2/6/2024   Dental   Dentist two times every year? (!) NO         2/6/2024   Hearing Screening   Hearing concerns? None of the above         2/6/2024   Driving Risk Screening   Patient/family members have concerns about driving No         2/6/2024   General Alertness/Fatigue Screening   Have you been more tired than usual lately? No         2/6/2024   Urinary Incontinence Screening   Bothered by leaking urine in past 6 months No          No data to display                Today's PHQ-9 Score:       2/6/2024     8:58 AM   PHQ-9 SCORE   PHQ-9 Total Score MyChart 10 (Moderate depression)   PHQ-9 Total Score 10         2/6/2024   Substance Use   Alcohol more than 3/day or more than 7/wk No   Do you have a current opioid prescription? No   How severe/bad is pain from 1 to 10? 0/10 (No Pain)   Do you use any other substances recreationally? No     Social History     Tobacco Use    Smoking status: Never    Smokeless tobacco: Never   Substance Use Topics    Alcohol use: Not Currently     Alcohol/week: 1.0 standard drink of alcohol     Comment: Alcoholic Drinks/day: rare    Drug use: No     {If there are gaps in the social history shown above, please follow the link to update and then refresh the note Link to Social and Substance History :612549}    {Link to Fracture Risk Assessment Tool (Optional):324306}    {Use the storyboard to review patient history, after sections have been marked as reviewed, refresh note to capture documentation:499124}  { REQUIRED AWV use this link to review and update sexual activity history  after section has been marked as reviewed, refresh note to capture documentation:056480}  Reviewed and updated as needed this visit by Provider                 "    {HISTORY OPTIONS (Optional):248012}  Current providers sharing in care for this patient include:  Patient Care Team:  Mayito Carlos MD as PCP - General (Internal Medicine)  Janelle Fournier, PharmD as Pharmacist (Pharmacist)  Jairo Tamayo MD as Assigned Neuroscience Provider  Mayito Carlos MD as Assigned PCP  Geetha Marques RP as Pharmacist (Pharmacist Ambulatory Care)  No Ref-Primary, Physician  Arya Gregory RPH as Pharmacist (Pharmacist)  Arya Gregory RPH as Assigned [place] Pharmacist    The following health maintenance items are reviewed in Epic and correct as of today:  Health Maintenance   Topic Date Due    RSV VACCINE (Pregnancy & 60+) (1 - 1-dose 60+ series) Never done    MEDICARE ANNUAL WELLNESS VISIT  02/23/2019    ANNUAL REVIEW OF HM ORDERS  07/11/2023    DIABETIC FOOT EXAM  08/04/2023    DTAP/TDAP/TD IMMUNIZATION (6 - Td or Tdap) 01/23/2024    EYE EXAM  03/02/2024    HEMOGLOBIN  03/27/2024    BMP  03/27/2024    A1C  05/06/2024    PHQ-9  08/06/2024    LIPID  11/06/2024    MICROALBUMIN  11/06/2024    FALL RISK ASSESSMENT  02/06/2025    ADVANCE CARE PLANNING  07/06/2026    DEPRESSION ACTION PLAN  Completed    INFLUENZA VACCINE  Completed    Pneumococcal Vaccine: 65+ Years  Completed    URINALYSIS  Completed    ZOSTER IMMUNIZATION  Completed    COVID-19 Vaccine  Completed    IPV IMMUNIZATION  Aged Out    HPV IMMUNIZATION  Aged Out    MENINGITIS IMMUNIZATION  Aged Out    RSV MONOCLONAL ANTIBODY  Aged Out     Review of Systems  {ROS Picklists (Optional):855482}     Objective    Exam  /64   Pulse 71   Temp 98.2  F (36.8  C) (Oral)   Resp 16   Ht 1.676 m (5' 6\")   Wt 69.5 kg (153 lb 3.2 oz)   SpO2 98%   BMI 24.73 kg/m     Estimated body mass index is 24.73 kg/m  as calculated from the following:    Height as of this encounter: 1.676 m (5' 6\").    Weight as of this encounter: 69.5 kg (153 lb 3.2 oz).    Physical Exam  {Exam Choices (Optional):364722}        " 2/6/2024   Mini Cog   Clock Draw Score 0 Abnormal   3 Item Recall 2 objects recalled   Mini Cog Total Score 2     {A Mini-Cog total score of 0-2 suggests the possibility of dementia, score of 3-5 suggests no dementia:667874}       Signed Electronically by: Mayito Carlos MD  {Email feedback regarding this note to [email or web address]   :976232}  Answers submitted by the patient for this visit:  Patient Health Questionnaire (Submitted on 2/6/2024)  If you checked off any problems, how difficult have these problems made it for you to do your work, take care of things at home, or get along with other people?: Somewhat difficult  PHQ9 TOTAL SCORE: 10

## 2024-02-07 ENCOUNTER — TRANSFERRED RECORDS (OUTPATIENT)
Dept: HEALTH INFORMATION MANAGEMENT | Facility: CLINIC | Age: 84
End: 2024-02-07
Payer: COMMERCIAL

## 2024-02-07 ENCOUNTER — TELEPHONE (OUTPATIENT)
Dept: INTERNAL MEDICINE | Facility: CLINIC | Age: 84
End: 2024-02-07
Payer: COMMERCIAL

## 2024-02-07 NOTE — TELEPHONE ENCOUNTER
Patient has viewed these results and message on [employer] already.    Called patient and left a detailed message for him to return call to schedule a telephone call with provider as requested by PCP. If patient returns call, please assist in scheduling. Will close encounter now.    Antonia Bailey CMA (AAMA)  [facility]

## 2024-02-07 NOTE — TELEPHONE ENCOUNTER
LMTCB result note printed off and placed in mail also    ----- Message from Mayito Carlos MD sent at 2/7/2024  7:53 AM CST -----  All labs look quite good but for thyroid function and please ask patient to make a phone visit with me to review and discuss and this may be on a non-emergent basis.  BRAIN

## 2024-02-07 NOTE — LETTER
"February 7, 2024      Murali Ortez  [address]   [place] [address]        Dear ,    We are writing to inform you of your test results.    \"All labs look quite good but for thyroid function and please ask patient to make a phone visit with me to review and discuss and this may be on a non-emergent basis.\" Dr. Carlos    Please call [phone] to schedule a phone visit.     Resulted Orders   CBC with platelets   Result Value Ref Range    WBC Count 8.8 4.0 - 11.0 10e3/uL    RBC Count 4.74 4.40 - 5.90 10e6/uL    Hemoglobin 14.4 13.3 - 17.7 g/dL    Hematocrit 41.5 40.0 - 53.0 %    MCV 88 78 - 100 fL    MCH 30.4 26.5 - 33.0 pg    MCHC 34.7 31.5 - 36.5 g/dL    RDW 12.9 10.0 - 15.0 %    Platelet Count 181 150 - 450 10e3/uL   Comprehensive metabolic panel   Result Value Ref Range    Sodium 139 135 - 145 mmol/L      Comment:      Reference intervals for this test were updated on 09/26/2023 to more accurately reflect our healthy population. There may be differences in the flagging of prior results with similar values performed with this method. Interpretation of those prior results can be made in the context of the updated reference intervals.     Potassium 4.7 3.4 - 5.3 mmol/L    Carbon Dioxide (CO2) 29 22 - 29 mmol/L    Anion Gap 11 7 - 15 mmol/L    Urea Nitrogen 20.4 8.0 - 23.0 mg/dL    Creatinine 1.36 (H) 0.67 - 1.17 mg/dL    GFR Estimate 52 (L) >60 mL/min/1.73m2    Calcium 7.9 (L) 8.8 - 10.2 mg/dL    Chloride 99 98 - 107 mmol/L    Glucose 137 (H) 70 - 99 mg/dL    Alkaline Phosphatase 78 40 - 150 U/L      Comment:      Reference intervals for this test were updated on 11/14/2023 to more accurately reflect our healthy population. There may be differences in the flagging of prior results with similar values performed with this method. Interpretation of those prior results can be made in the context of the updated reference intervals.    AST 20 0 - 45 U/L      Comment:      Reference " intervals for this test were updated on 6/12/2023 to more accurately reflect our healthy population. There may be differences in the flagging of prior results with similar values performed with this method. Interpretation of those prior results can be made in the context of the updated reference intervals.    ALT 13 0 - 70 U/L      Comment:      Reference intervals for this test were updated on 6/12/2023 to more accurately reflect our healthy population. There may be differences in the flagging of prior results with similar values performed with this method. Interpretation of those prior results can be made in the context of the updated reference intervals.      Protein Total 7.7 6.4 - 8.3 g/dL    Albumin 4.7 3.5 - 5.2 g/dL    Bilirubin Total 0.5 <=1.2 mg/dL   Lipid panel reflex to direct LDL Fasting   Result Value Ref Range    Cholesterol 185 <200 mg/dL    Triglycerides 158 (H) <150 mg/dL    Direct Measure HDL 52 >=40 mg/dL    LDL Cholesterol Calculated 101 (H) <=100 mg/dL    Non HDL Cholesterol 133 (H) <130 mg/dL    Patient Fasting > 8hrs? Unknown     Narrative    Cholesterol  Desirable:  <200 mg/dL    Triglycerides  Normal:  Less than 150 mg/dL  Borderline High:  150-199 mg/dL  High:  200-499 mg/dL  Very High:  Greater than or equal to 500 mg/dL    Direct Measure HDL  Female:  Greater than or equal to 50 mg/dL   Male:  Greater than or equal to 40 mg/dL    LDL Cholesterol  Desirable:  <100mg/dL  Above Desirable:  100-129 mg/dL   Borderline High:  130-159 mg/dL   High:  160-189 mg/dL   Very High:  >= 190 mg/dL    Non HDL Cholesterol  Desirable:  130 mg/dL  Above Desirable:  130-159 mg/dL  Borderline High:  160-189 mg/dL  High:  190-219 mg/dL  Very High:  Greater than or equal to 220 mg/dL   Albumin Random Urine Quantitative with Creat Ratio   Result Value Ref Range    Creatinine Urine mg/dL 86.1 mg/dL      Comment:      The reference ranges have not been established in urine creatinine. The results should be  integrated into the clinical context for interpretation.    Albumin Urine mg/L <12.0 mg/L      Comment:      The reference ranges have not been established in urine albumin. The results should be integrated into the clinical context for interpretation.    Albumin Urine mg/g Cr        Comment:      Unable to calculate, urine albumin and/or urine creatinine is outside detectable limits.  Microalbuminuria is defined as an albumin:creatinine ratio of 17 to 299 for males and 25 to 299 for females. A ratio of albumin:creatinine of 300 or higher is indicative of overt proteinuria.  Due to biologic variability, positive results should be confirmed by a second, first-morning random or 24-hour timed urine specimen. If there is discrepancy, a third specimen is recommended. When 2 out of 3 results are in the microalbuminuria range, this is evidence for incipient nephropathy and warrants increased efforts at glucose control, blood pressure control, and institution of therapy with an angiotensin-converting-enzyme (ACE) inhibitor (if the patient can tolerate it).     TSH   Result Value Ref Range    TSH 4.61 (H) 0.30 - 4.20 uIU/mL   UA Macroscopic with reflex to Microscopic and Culture   Result Value Ref Range    Color Urine Yellow Colorless, Straw, Light Yellow, Yellow    Appearance Urine Clear Clear    Glucose Urine 100 (A) Negative mg/dL    Bilirubin Urine Negative Negative    Ketones Urine Negative Negative mg/dL    Specific Gravity Urine 1.015 1.005 - 1.030    Blood Urine Negative Negative    pH Urine 6.0 5.0 - 8.0    Protein Albumin Urine Negative Negative mg/dL    Urobilinogen Urine 0.2 0.2, 1.0 E.U./dL    Nitrite Urine Negative Negative    Leukocyte Esterase Urine Trace (A) Negative   Prostate Specific Antigen Screen   Result Value Ref Range    Prostate Specific Antigen Screen 7.05 ng/mL      Comment:      No reference ranges have been established for patients over 80 years.    Narrative    This result is obtained using the  Roche Elecsys total PSA method on the elfego e801 immunoassay analyzer. Results obtained with different assay methods or kits cannot be used interchangeably.   Hemoglobin A1c   Result Value Ref Range    Hemoglobin A1C 6.9 (H) 0.0 - 5.6 %      Comment:      Normal <5.7%   Prediabetes 5.7-6.4%    Diabetes 6.5% or higher     Note: Adopted from ADA consensus guidelines.   UA Microscopic with Reflex to Culture   Result Value Ref Range    Bacteria Urine Few (A) None Seen /HPF    RBC Urine 0-2 0-2 /HPF /HPF    WBC Urine 5-10 (A) 0-5 /HPF /HPF    Squamous Epithelials Urine Few (A) None Seen /LPF    Narrative    Urine Culture not indicated       If you have any questions or concerns, please call the clinic at the number listed above.       Sincerely,  Kristine MORELAND RN

## 2024-02-08 ENCOUNTER — VIRTUAL VISIT (OUTPATIENT)
Dept: INTERNAL MEDICINE | Facility: CLINIC | Age: 84
End: 2024-02-08
Payer: COMMERCIAL

## 2024-02-08 DIAGNOSIS — E03.9 HYPOTHYROIDISM, UNSPECIFIED TYPE: Primary | ICD-10-CM

## 2024-02-08 PROCEDURE — 99441 PR PHYSICIAN TELEPHONE EVALUATION 5-10 MIN: CPT | Mod: 93 | Performed by: INTERNAL MEDICINE

## 2024-02-08 RX ORDER — LEVOTHYROXINE SODIUM 25 UG/1
25 TABLET ORAL DAILY
Qty: 90 TABLET | Refills: 11 | Status: SHIPPED | OUTPATIENT
Start: 2024-02-08

## 2024-02-08 NOTE — PROGRESS NOTES
Wilfrid Ortez is a 83 year oldwho is being evaluated via a billable telephone visit.       What phone number would you like to be contacted at? [phone]      Assessment & Plan  Hypothyroidism with mild elevation in TSH.  Start levothyroxine 25 mcg daily recheck 1 month's time status with TSH and thyroid exam.    Elevated PSA.  Discussed the potential that this could be prostate cancer.  Patient is 83 years of age we will repeat PSA and do DANIEL with annual wellness visit due in March 2024 May ultimately require urologic consultation.  Treatment somewhat limited to observation or none aggressive therapy in light of the patient's age.  83.  Asymptomatic without bone pain or urologic symptomatology.  We had a good discussion.  15 minutes spent on the date of the encounter doing chart review, patient visit and documentation I spoke with the patient directly on the phone 9 minutes.       Subjective  History as noted above mild elevation in TSH asymptomatic without cold intolerance constipation dry skin some lassitude.  Prior history of panic attacks anxiety.  Low-dose thyroid started today.  Recheck with annual wellness visit 1 month.  Also PSA elevated see above asymptomatic without urologic symptomatology and denies bone pain.  Age 83 may be in the category of observation phase even if prostate cancer is found.  Will discuss in 1 month ultimately may send patient to [facility] for second opinion     Review of Systems   No blood in stool urine sputum med list reviewed reconciled.  He feels otherwise well.  Has found a new friend and personal care attendant Jairo.  He is very helpful to him.       Mayito Carlos MD

## 2024-02-27 ENCOUNTER — TELEPHONE (OUTPATIENT)
Dept: INTERNAL MEDICINE | Facility: CLINIC | Age: 84
End: 2024-02-27
Payer: COMMERCIAL

## 2024-02-27 NOTE — TELEPHONE ENCOUNTER
Pt calling to confirm/clarify how much levothyroxine he should be taking. Based on current directions, states as below-    Sig - Route: Take 1 tablet (25 mcg) by mouth daily - Oral     Pt states based on last visit with PCP, pt believes he was supposed to increase to two tabs however pt states unsure. Writer unable to find this from last office visit notes on 2/8.     Writer informed would route this to PCP for clarification and care team will call pt back.    Pt verbalized understanding and thanked for the call.     Pt currently takes only 1 tab (25 mcg) daily. Will take this for now until he hears back.

## 2024-03-05 ENCOUNTER — TRANSFERRED RECORDS (OUTPATIENT)
Dept: HEALTH INFORMATION MANAGEMENT | Facility: CLINIC | Age: 84
End: 2024-03-05
Payer: COMMERCIAL

## 2024-03-07 ENCOUNTER — OFFICE VISIT (OUTPATIENT)
Dept: INTERNAL MEDICINE | Facility: CLINIC | Age: 84
End: 2024-03-07
Payer: COMMERCIAL

## 2024-03-07 VITALS
SYSTOLIC BLOOD PRESSURE: 128 MMHG | HEIGHT: 66 IN | DIASTOLIC BLOOD PRESSURE: 64 MMHG | OXYGEN SATURATION: 97 % | BODY MASS INDEX: 24.41 KG/M2 | WEIGHT: 151.9 LBS | TEMPERATURE: 98.1 F | HEART RATE: 89 BPM | RESPIRATION RATE: 20 BRPM

## 2024-03-07 DIAGNOSIS — E03.9 HYPOTHYROIDISM, UNSPECIFIED TYPE: Primary | ICD-10-CM

## 2024-03-07 LAB — TSH SERPL DL<=0.005 MIU/L-ACNC: 0.94 UIU/ML (ref 0.3–4.2)

## 2024-03-07 PROCEDURE — 99214 OFFICE O/P EST MOD 30 MIN: CPT | Performed by: INTERNAL MEDICINE

## 2024-03-07 PROCEDURE — 36415 COLL VENOUS BLD VENIPUNCTURE: CPT | Performed by: INTERNAL MEDICINE

## 2024-03-07 PROCEDURE — 84443 ASSAY THYROID STIM HORMONE: CPT | Performed by: INTERNAL MEDICINE

## 2024-03-07 RX ORDER — RESPIRATORY SYNCYTIAL VIRUS VACCINE 120MCG/0.5
0.5 KIT INTRAMUSCULAR ONCE
Qty: 1 EACH | Refills: 0 | Status: CANCELLED | OUTPATIENT
Start: 2024-03-07 | End: 2024-03-07

## 2024-03-07 NOTE — PROGRESS NOTES
"Assessment/Plan:    Hypothyroidism on replacement feels better check TSH today currently on levothyroxine 25 mcg daily.    Diabetes mellitus type 2 will check A1c blood sugar lipid panel urine for microalbumin with next office visit 4 months time fasting.    Panic attacks continue paroxetine 40 mg daily.  Followed by psychiatry.    Hypertension controlled 128/64 same meds and cares reemphasized salt restricted diet.    Hyperlipidemia on statin therapy no history of target organ damage related to same.    Cold air induced asthma asymptomatic at this juncture no wheezing no shortness of breath no cough.  Consider in the future rescue albuterol metered-dose inhaler as needed.    25 minutes spent on the date of the encounter doing chart review, patient visit, and documentation     Subjective:  Wilfrid Ortez is a 83 year old male presents for the following health issues history as above generally doing well he feels stronger.    ROS:  No blood in stool or urine denies hemoptysis med list reviewed reconciled.  Some early morning wheezing.  Wheezing on exposure to cold air but generally feels okay today in terms of his breathing.    Objective:  /64 (BP Location: Right arm, Patient Position: Sitting, Cuff Size: Adult Regular)   Pulse 89   Temp 98.1  F (36.7  C) (Oral)   Resp 20   Ht 1.676 m (5' 6\")   Wt 68.9 kg (151 lb 14.4 oz)   SpO2 97%   BMI 24.52 kg/m    Neck veins nondistended no carotid bruits lungs are clear no rales rhonchi or wheezes heart tones regular rhythm without murmur rub or gallop abdomen mild centripetal obesity noted BMI 25 extremities free of edema cyanosis or clubbing seems brighter stronger more alert.  Feels better on thyroid supplement even low-dose 25 mcg daily.  Average dose 100.    Mayito Carlos MD  Internal Medicine    "

## 2024-03-13 NOTE — TELEPHONE ENCOUNTER
Mayito Carlos MD  [facility] Pool 44 minutes ago (8:14 AM)     MB  Please call patient for me.  Okay for Tylenol 500 mg daily or 3-4 times daily as needed for pain or low-grade temperature or body aches.     Aspirin dose should be decreased to 81 mg daily coated Jamal aspirin buffered 81 mg daily.  Please call patient for me.  MECHE GOTTI         hard copy, drawn during this pregnancy

## 2024-03-31 DIAGNOSIS — Z00.00 ROUTINE GENERAL MEDICAL EXAMINATION AT A HEALTH CARE FACILITY: ICD-10-CM

## 2024-04-05 ENCOUNTER — NURSE TRIAGE (OUTPATIENT)
Dept: NURSING | Facility: CLINIC | Age: 84
End: 2024-04-05
Payer: COMMERCIAL

## 2024-04-06 NOTE — TELEPHONE ENCOUNTER
"Pt reports \"sitting on toilet felt something burning in groin right side, look down and all red\". Pt reports burning has resolved but skin in that area is reddened \"somewhere between a quarter and 50 cent piece\". Pt reports \"looks like a rash\". Pt thinks urine gets in the area and \"could have caused a rash\". Red area is not swollen, blistered. Pt denies pus. Area is itchy per pt. Pt reports he has had an odor \"in that area before\". Pt states \"pain is zero\". Pt reports rash is on groin area, not genitals.     Writer advised pt on home care and call back protocol per Care Advice. See PCP within two weeks if no improvement.     Pt verbalizes understanding and agrees to plan.       Reason for Disposition   Red, moist, irritated area between skin folds (or under larger breasts)    Additional Information   Negative: [1] Sudden onset of rash (within last 2 hours) AND [2] difficulty breathing or swallowing   Negative: Sounds like a life-threatening emergency to the triager   Negative: Athlete's Foot suspected (i.e., itchy rash between the toes)   Negative: Impetigo suspected (i.e., painless infected superficial small sores, less than 1 inch or 2.5 cm, often covered by a soft, yellow-brown scab or crust; sometimes occurring near nasal openings)   Negative: Jock Itch suspected (i.e., itchy rash on inner thighs near genital area)   Negative: Insect bite(s) suspected   Negative: Localized lump (or swelling) without redness or rash   Negative: [1] Mpox suspected (e.g., direct skin contact such as sex, recent travel to [place] or [place]) AND [2] symptoms of Mpox (e.g., rash, fever, muscle aches, or swollen lymph nodes)   Negative: [1] At risk for Mpox (men-who-have-sex-with-men) AND [2] possible exposure (e.g., multiple sex partners in past 21 days) AND [3] symptoms of Mpox (e.g., rash, fever, muscle aches, or swollen lymph nodes)   Negative: Poison ivy, oak, or sumac rash suspected (e.g., itchy rash after contact with " poison ivy)   Negative: Rash of female genital area  (e.g., labia, vagina, vulva)   Negative: Rash of male genital area (e.g., penis, scrotum)   Negative: Redness of immunization site   Negative: Ringworm suspected (i.e., round pink patch, sometimes looks like ring, usually 1/2 to 1 inch [12-25 mm],  in size, slowly increasing in size)   Negative: Shingles suspected (i.e., painful rash, multiple small blisters grouped together in one area of body; dermatomal distribution)   Negative: Small spot, skin growth, or mole   Negative: Sores or skin ulcer, not a rash   Negative: Wound infection suspected (i.e., pain, spreading redness, or pus; in a cut, puncture, scrape or sutured wound)   Negative: [1] Localized purple or blood-colored spots or dots AND [2] not from injury or friction AND [3] fever   Negative: [1] Red area or streak AND [2] fever   Negative: [1] Rash is painful to touch AND [2] fever   Negative: [1] Looks infected (spreading redness, pus) AND [2] large red area (> 2 in. or 5 cm)   Negative: [1] Looks infected (spreading redness, pus) AND [2] diabetes mellitus or weak immune system (e.g., HIV positive, cancer chemo, splenectomy, organ transplant, chronic steroids)   Negative: [1] Localized purple or blood-colored spots or dots AND [2] not from injury or friction AND [3] no fever   Negative: Patient sounds very sick or weak to the triager   Negative: [1] Looks infected (spreading redness, pus) AND [2] no fever   Negative: Looks like a boil, infected sore, deep ulcer or other infected rash   Negative: [1] Localized rash is very painful AND [2] no fever   Negative: Genital area rash   Negative: Lyme disease suspected (e.g., bull's eye rash or tick bite / exposure)   Negative: [1] Applying cream or ointment AND [2] causes severe itch, burning or pain   Negative: Medication patch causing local rash or itching   Negative: [1] Pimples (localized) AND [2 ] no improvement after using Care Advice   Negative: Tender  bumps in armpits   Negative: [1] Severe localized itching AND [2] after 2 days of steroid cream   Negative: Localized rash present > 7 days    Protocols used: Rash or Redness - [id number]

## 2024-04-16 ENCOUNTER — TELEPHONE (OUTPATIENT)
Dept: INTERNAL MEDICINE | Facility: CLINIC | Age: 84
End: 2024-04-16
Payer: COMMERCIAL

## 2024-04-18 ENCOUNTER — TRANSFERRED RECORDS (OUTPATIENT)
Dept: HEALTH INFORMATION MANAGEMENT | Facility: CLINIC | Age: 84
End: 2024-04-18
Payer: COMMERCIAL

## 2024-04-19 NOTE — TELEPHONE ENCOUNTER
Received fax from [facility] requesting last OV notes.       OV notes were faxed to them.       Micah Hendrickson Jr., CMA on 4/19/2024 at 1:44 PM

## 2024-05-21 ENCOUNTER — TRANSFERRED RECORDS (OUTPATIENT)
Dept: HEALTH INFORMATION MANAGEMENT | Facility: CLINIC | Age: 84
End: 2024-05-21
Payer: COMMERCIAL

## 2024-07-05 ENCOUNTER — TRANSFERRED RECORDS (OUTPATIENT)
Dept: MULTI SPECIALTY CLINIC | Facility: CLINIC | Age: 84
End: 2024-07-05

## 2024-07-05 LAB — RETINOPATHY: NORMAL

## 2024-07-22 DIAGNOSIS — I10 ESSENTIAL HYPERTENSION: ICD-10-CM

## 2024-07-22 RX ORDER — SIMVASTATIN 20 MG
20 TABLET ORAL AT BEDTIME
Qty: 90 TABLET | Refills: 2 | Status: SHIPPED | OUTPATIENT
Start: 2024-07-22

## 2024-08-05 ENCOUNTER — OFFICE VISIT (OUTPATIENT)
Dept: INTERNAL MEDICINE | Facility: CLINIC | Age: 84
End: 2024-08-05
Payer: COMMERCIAL

## 2024-08-05 VITALS
RESPIRATION RATE: 15 BRPM | DIASTOLIC BLOOD PRESSURE: 62 MMHG | OXYGEN SATURATION: 99 % | BODY MASS INDEX: 24.85 KG/M2 | SYSTOLIC BLOOD PRESSURE: 124 MMHG | HEART RATE: 77 BPM | TEMPERATURE: 98.2 F | HEIGHT: 66 IN | WEIGHT: 154.6 LBS

## 2024-08-05 DIAGNOSIS — Z23 NEED FOR TDAP VACCINATION: ICD-10-CM

## 2024-08-05 DIAGNOSIS — Z29.11 NEED FOR VACCINATION AGAINST RESPIRATORY SYNCYTIAL VIRUS: ICD-10-CM

## 2024-08-05 DIAGNOSIS — K92.1 MELENA: Primary | ICD-10-CM

## 2024-08-05 DIAGNOSIS — E11.8 TYPE 2 DIABETES MELLITUS WITH COMPLICATION, WITHOUT LONG-TERM CURRENT USE OF INSULIN (H): Primary | ICD-10-CM

## 2024-08-05 DIAGNOSIS — N18.31 CHRONIC KIDNEY DISEASE, STAGE 3A (H): ICD-10-CM

## 2024-08-05 DIAGNOSIS — Z12.5 SCREENING FOR PROSTATE CANCER: ICD-10-CM

## 2024-08-05 DIAGNOSIS — F33.41 MAJOR DEPRESSIVE DISORDER, RECURRENT EPISODE, IN PARTIAL REMISSION (H): ICD-10-CM

## 2024-08-05 LAB
CHOLEST SERPL-MCNC: 158 MG/DL
FASTING STATUS PATIENT QL REPORTED: ABNORMAL
FASTING STATUS PATIENT QL REPORTED: ABNORMAL
GLUCOSE SERPL-MCNC: 149 MG/DL (ref 70–99)
HBA1C MFR BLD: 7.6 % (ref 0–5.6)
HDLC SERPL-MCNC: 43 MG/DL
HGB BLD-MCNC: 12.3 G/DL (ref 13.3–17.7)
LDLC SERPL CALC-MCNC: 76 MG/DL
NONHDLC SERPL-MCNC: 115 MG/DL
PSA SERPL DL<=0.01 NG/ML-MCNC: 5.88 NG/ML
TRIGL SERPL-MCNC: 196 MG/DL

## 2024-08-05 PROCEDURE — 36415 COLL VENOUS BLD VENIPUNCTURE: CPT | Performed by: INTERNAL MEDICINE

## 2024-08-05 PROCEDURE — 99214 OFFICE O/P EST MOD 30 MIN: CPT | Performed by: INTERNAL MEDICINE

## 2024-08-05 PROCEDURE — 85018 HEMOGLOBIN: CPT | Performed by: INTERNAL MEDICINE

## 2024-08-05 PROCEDURE — 82947 ASSAY GLUCOSE BLOOD QUANT: CPT | Performed by: INTERNAL MEDICINE

## 2024-08-05 PROCEDURE — 83036 HEMOGLOBIN GLYCOSYLATED A1C: CPT | Performed by: INTERNAL MEDICINE

## 2024-08-05 PROCEDURE — 80061 LIPID PANEL: CPT | Performed by: INTERNAL MEDICINE

## 2024-08-05 PROCEDURE — G2211 COMPLEX E/M VISIT ADD ON: HCPCS | Performed by: INTERNAL MEDICINE

## 2024-08-05 PROCEDURE — G0103 PSA SCREENING: HCPCS | Performed by: INTERNAL MEDICINE

## 2024-08-05 ASSESSMENT — ANXIETY QUESTIONNAIRES
7. FEELING AFRAID AS IF SOMETHING AWFUL MIGHT HAPPEN: NOT AT ALL
3. WORRYING TOO MUCH ABOUT DIFFERENT THINGS: NOT AT ALL
GAD7 TOTAL SCORE: 0
2. NOT BEING ABLE TO STOP OR CONTROL WORRYING: NOT AT ALL
GAD7 TOTAL SCORE: 0
GAD7 TOTAL SCORE: 0
IF YOU CHECKED OFF ANY PROBLEMS ON THIS QUESTIONNAIRE, HOW DIFFICULT HAVE THESE PROBLEMS MADE IT FOR YOU TO DO YOUR WORK, TAKE CARE OF THINGS AT HOME, OR GET ALONG WITH OTHER PEOPLE: NOT DIFFICULT AT ALL
4. TROUBLE RELAXING: NOT AT ALL
6. BECOMING EASILY ANNOYED OR IRRITABLE: NOT AT ALL
8. IF YOU CHECKED OFF ANY PROBLEMS, HOW DIFFICULT HAVE THESE MADE IT FOR YOU TO DO YOUR WORK, TAKE CARE OF THINGS AT HOME, OR GET ALONG WITH OTHER PEOPLE?: NOT DIFFICULT AT ALL
5. BEING SO RESTLESS THAT IT IS HARD TO SIT STILL: NOT AT ALL
1. FEELING NERVOUS, ANXIOUS, OR ON EDGE: NOT AT ALL
7. FEELING AFRAID AS IF SOMETHING AWFUL MIGHT HAPPEN: NOT AT ALL

## 2024-08-05 ASSESSMENT — PATIENT HEALTH QUESTIONNAIRE - PHQ9
10. IF YOU CHECKED OFF ANY PROBLEMS, HOW DIFFICULT HAVE THESE PROBLEMS MADE IT FOR YOU TO DO YOUR WORK, TAKE CARE OF THINGS AT HOME, OR GET ALONG WITH OTHER PEOPLE: SOMEWHAT DIFFICULT
SUM OF ALL RESPONSES TO PHQ QUESTIONS 1-9: 3
SUM OF ALL RESPONSES TO PHQ QUESTIONS 1-9: 3

## 2024-08-05 NOTE — PROGRESS NOTES
"Assessment/Plan:    Diabetes mellitus type 2 we will check hemoglobin A1c blood sugar lipid panel today.  Stable.    Irritable bowel syndrome with history of melena.  Advised patient to stop Pepto-Bismol hemoglobin level pending see above Metamucil suggested/as tolerated    Suggestive of IBS.  1 heaping tablespoon of Metamucil daily advised.    Elevated PSA level.  Screen for prostate cancer by history.  Prior history of TURP for 40% of the prostate gland removed.  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  No cancer found at the last TURP.  Previous PSA elevated to 8.  May ultimately require repeat urologic consultation at [facility].    25 minutes spent on the date of the encounter doing chart review, review of test results, interpretation of tests, patient visit, and documentation     Subjective:  Wilfrid Ortez is a 83 year old male presents for the following health issues melanotic stools.  Previously using Pepto-Bismol.  Discussed.  Metamucil better alternative.    Does not check blood sugars regularly.  Not obese BMI 25.  A1c blood sugar lipid panel pending along with hemoglobin level.    Previous BPH with status post TURP 40% of prostate resected.  Latest PSA elevated 8.  At the time of the TURP the prostatic fragments of histologically were negative for cancer.  Dr. GALLARDO now retired urologist presiding.  May ultimately require repeat consultation with [facility].    ROS:  No blood in urine stool or sputum med list reviewed reconciled.  Denies chest pain shortness of breath.    Objective:  /62   Pulse 77   Temp 98.2  F (36.8  C) (Oral)   Resp 15   Ht 1.676 m (5' 6\")   Wt 70.1 kg (154 lb 9.6 oz)   SpO2 99%   BMI 24.95 kg/m    Easily conversant soft-spoken good spirited not in acute distress he appears younger than his stated age.  Chest is clear heart tones normal abdomen benign extremities free of edema neck veins nondistended no thyromegaly no thyroid nodules send no " carotid bruits slightly pale in color.    Mayito Carlos MD  Internal Medicine    The longitudinal plan of care for the diagnosis(es)/condition(s) as documented were addressed during this visit. Due to the added complexity in care, I will continue to support Murali in the subsequent management and with ongoing continuity of care.      Answers submitted by the patient for this visit:  Patient Health Questionnaire (Submitted on 8/5/2024)  If you checked off any problems, how difficult have these problems made it for you to do your work, take care of things at home, or get along with other people?: Somewhat difficult  PHQ9 TOTAL SCORE: 3  SAVANNA-7 (Submitted on 8/5/2024)  SAVANNA 7 TOTAL SCORE: 0  Diabetes Visit (Submitted on 8/5/2024)  Chief Complaint: Chronic problems general questions HPI Form  Frequency of checking blood sugars:: a few times a month  What time of day are you checking your blood sugars : before meals  Have you had any blood sugars above 200?: No  Have you had any blood sugars below 70?: No  Hypoglycemia symptoms:: shakiness, weakness  Diabetic concerns:: none  Paraesthesia present:: numbness in feet, burning in feet, weight loss  Depression / Anxiety Questionnaire (Submitted on 8/5/2024)  Chief Complaint: Chronic problems general questions HPI Form  Depression/Anxiety: Depression & Anxiety  Depression & Anxiety (Submitted on 8/5/2024)  Chief Complaint: Chronic problems general questions HPI Form  Status since last visit:: good  Anxiety since last: : medium  Other associated symptoms of depression:: No  Other associated symotome: : Yes  Significant life event: : health concerns  Anxious:: Yes  Current substance use:: No  General Questionnaire (Submitted on 8/5/2024)  Chief Complaint: Chronic problems general questions HPI Form  What is the reason for your visit today? : general issues  How many servings of fruits and vegetables do you eat daily?: 0-1  On average, how many sweetened beverages do you  drink each day (Examples: soda, juice, sweet tea, etc.  Do NOT count diet or artificially sweetened beverages)?: 1  How many minutes a day do you exercise enough to make your heart beat faster?: 9 or less  How many days a week do you exercise enough to make your heart beat faster?: 3 or less  How many days per week do you miss taking your medication?: 0

## 2024-08-05 NOTE — PROGRESS NOTES
"  {PROVIDER CHARTING PREFERENCE:698449}    Gaby Carrion is a 83 year old, presenting for the following health issues:  Follow Up      8/5/2024    10:27 AM   Additional Questions   Roomed by Lilly     History of Present Illness       Mental Health Follow-up:  Patient presents to follow-up on Depression & Anxiety.Patient's depression since last visit has been:  Good  The patient is not having other symptoms associated with depression.  Patient's anxiety since last visit has been:  Medium  The patient is having other symptoms associated with anxiety.  Any significant life events: health concerns  Patient is feeling anxious or having panic attacks.  Patient has no concerns about alcohol or drug use.    Diabetes:   He presents for follow up of diabetes.  He is checking home blood glucose a few times a month.   He checks blood glucose before meals.  Blood glucose is never over 200 and never under 70. He is aware of hypoglycemia symptoms including shakiness and weakness.    He has no concerns regarding his diabetes at this time.  He is having numbness in feet, burning in feet and weight loss.            Reason for visit:  General issues    He eats 0-1 servings of fruits and vegetables daily.He consumes 1 sweetened beverage(s) daily.He exercises with enough effort to increase his heart rate 9 or less minutes per day.  He exercises with enough effort to increase his heart rate 3 or less days per week.   He is taking medications regularly.       {MA/LPN/RN Pre-Provider Visit Orders- hCG/UA/Strep (Optional):582942}  {SUPERLIST (Optional):419424}  {additonal problems for provider to add (Optional):047859}    {ROS Picklists (Optional):804723}      Objective    /62   Pulse 77   Temp 98.2  F (36.8  C) (Oral)   Resp 15   Ht 1.676 m (5' 6\")   Wt 70.1 kg (154 lb 9.6 oz)   SpO2 99%   BMI 24.95 kg/m    Body mass index is 24.95 kg/m .  Physical Exam   {Exam List (Optional):580166}    {Diagnostic Test Results " (Optional):667207}        Signed Electronically by: Mayito Carlos MD  {Email feedback regarding this note to [email or web address]   :[id number]}

## 2024-08-06 ENCOUNTER — TELEPHONE (OUTPATIENT)
Dept: INTERNAL MEDICINE | Facility: CLINIC | Age: 84
End: 2024-08-06
Payer: COMMERCIAL

## 2024-08-06 NOTE — TELEPHONE ENCOUNTER
Reason for Call:  Other Seeking Recommendation     Detailed comments: Patient shares that Dr. Carlos called him yesterday, relayed low hemoglobin with recommendation to see gastroenterology. Patient would like to know if Dr. Carlos has a specific doctor he should see.     Phone Number Patient can be reached at: Home number on file [phone] (home)    Best Time: any    Can we leave a detailed message on this number? YES    Call taken on 8/6/2024 at 10:05 AM by Lora Duarte

## 2024-08-06 NOTE — TELEPHONE ENCOUNTER
Called patient to relay below message from provider. Patient verbalized understanding.         Mayito Carlos MD  [facility] - Primary [facility]25 minutes ago (10:49 AM)     ANGELA Bingham is a very good gastroenterologist at [facility].  [phone] is the [facility] general number.  The way things are now it is difficult to this specify a single specialist to see but Dr. Mcdowell is one of the very best in that group at I know him personally very well.  MECHEB

## 2024-08-06 NOTE — TELEPHONE ENCOUNTER
Pt was seen by PCP yesterday 8/5. Pt has an upcoming GI appointment 9/18.    Pt has a follow up appointment with PCP 9/5. He is wondering if he should reschedule for after his GI appt 9/18 so he can discuss GI results with you.    GENE Contreras.

## 2024-08-07 NOTE — TELEPHONE ENCOUNTER
Mayito Carlos MD  [facility] - Primary Care15 hours ago (5:35 PM)     MB  No please ask patient to keep appointment with me as scheduled and thanks!  BRAIN

## 2024-08-13 NOTE — PROGRESS NOTES
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, 2021  Significant anxiety disorder follows with psychiatry  Past event 2016 worked up with abnormal MRI/EEG  Essential tremor with family history of tremor      Since last seen a year ago   No surgeries  No major illnesses    ED visit 12/30/2023 right knee pain/fall  ED visit 1/30/2024 anxiety/panic attack    In the past he has had some \"lightheadedness/dizziness  No new hearing loss  No new tinnitus      He has some chronic right shoulder difficulty followed by other physicians I gave him some range of motion exercises below to prevent frozen shoulder    Patient may well play the piano somewhat hard with his tremor  He does do a lot of crossword puzzles    [facility]  8/14/2024,    25 out of 30    No further TGA spells  Does have the anxiety panic difficulties follows with psychiatry  Dressing okay  Can do buttons  Can get his COVID on  Eating okay but went to above thinking it was hard to hold the plate in the shop  Balance is okay the fall that he had he was coming out of [place] and thought there was not another step but there was any way down    Has had some tremors going on since high school  He feels the tremors are worse when he is tired or in the evening    I do not think he has actual Parkinson's    Has some mild neurodegenerative changes      A.  TGA        April 1, 2021        Since last seen no further episodes of \"amnesia\"         EEG 8/23/2021, shows a little bit of mild 5-6 Hz theta disorganization on the right hemisphere rarely         EEG 2016 showed a little bit of the left theta disorganization 5-6 Hz     "    This is a fairly nonspecific finding prefer not to add any antiepileptic medications unless he had stereotypical episodes with a discrete onset and offset.    [facility]  4/21/2021,        24 out of 30  8/30/2021,        27 out of 30  8/30/2023,        27 out of 30  8/14/2024,       25 out of 30      Patient thought that his memory was maybe a little bit worse but when we did actual testing he did well seems to be stable  Talked about good routines  Talked about exercise  Talked about reasonable nutrition  Talked about good sleep cycle  Also talked about music he is a  does have a keyboard and may be doing that a little bit would help stimulate the brain        B.  Essential tremor        Head titubation is also left-handed and has some left hand tremor        In regards to his essential tremor he is moved into an apartment        He is not out and about quite as much        He uses propranolol 10 mg tablet, 2 tabs p.o. twice daily         Has more trouble when he is trying to eat dinner due to the action component        Talked about some of the newer treatments that are out but I do not feel this tremor is bad enough to warrant those he agreed    C.  Vertigo         patient was in the ER on June 24, 2021 with vertigo       Patient blamed it on actually missing his morning medicines that day which include Paxil but sometimes an abrupt change in dose can cause unsteadiness/dizziness       Seem to respond to treatment with meclizine and some exercises       Work-up showed a CTA with questionable changes on the intracranial vessels but there was motion artifact       Follow-up MRA showed no significant intracranial stenosis        Laboratory data reviewed    D.  Kidney stones 7/5/2022 passed them without intervention              Past neurologic history review:  April 1, 2021 had difficulty with amnesia  He had woke up in the afternoon at about 12:30 PM on 4/1/2021.  He seemed to be forgetting events of the  "morning.  It seemed that the memory difficulty or amnesia was transient  He was alone at the time that this was occurring  He went on to have an MRI scan of the brain that was unremarkable    Patient had seen a psychiatrist on March 18, 2021  Has had 8 panic attacks over 2 months.  More than usual    Patient states that on April 1, 2021 he had gotten up in the morning had breakfast  He then picked up a friend and took his friend from the [facility] over to the [facility] to drive him off for an appointment  He felt awful he was driving over there  He dropped him off and then left him  When he came home he talk to his partner stated that he was not feeling well  He laid down  He then awoke later and seemed to have forgotten what had happened over at least an hour to couple hours    10 to 15 years ago he was up at [facility] with his brother he was out on a boat  [facility] in [facility]  Did not have any recollection  His 81 mg of aspirin was increased to full aspirin possible TIA  Unclear if that was an amnestic spell    He is on some propranolol 10 mg, 2 tabs twice daily for his tremor   Does have some inhalers but does not get exacerbated by his propranolol    Formal [place] testing 2021 was 24 out of 30    The patient does have difficulty with some head tremor, some voice tremor, and some hand tremor.    The patient notices the tremor more when he is more stressed or anxious.    He does have a past history of panic attacks, which are variable and uses lorazepam for this.            Past medical history  TGA  Essential tremor at least since high school  Mild memory loss  Anxiety/panic attacks  Hyperlipidemia  Diabetes  Benign prostatic hypertrophy  Hiatal hernia  Obstructive sleep apnea  Blepharospasm  Left CTS  Chronic visual loss left eye/chronic droopiness right eye    Habits  Does not smoke  Does not drink alcohol  Patient is left-handed  Bowls  " right-handed and left-handed  Mouse on computer uses the right hand  Plays piano      Family history  Father with heart disease  at 57 also had rheumatic fever  Mother with cancer of the throat  at 72 also had diabetes mellitus.   Brother with epilepsy, DM  No family history of tremor    Past work-up  1. MRI scan of the head December 15, 2016 small vessel changes, small encephalomalacia in the posterior body of  the corpus callosum.  2. EEG on December 15, 2016, dysrhythmia grade II read by Dr. Taylor with some 8 Hz posterior dominant rhythm, some 5 to 6 Hz theta, a little bit of left temporal slowing.    Recent work-up  MRI scan brain 2021  A.  No acute stroke mass or bleed  B.  Mild to moderate atrophy and chronic small vessel changes  C.  Mild to moderate cerebellar atrophy   laboratory data 2021  Sodium 140 potassium 4.3  BUN 18 creatinine 1.2  Glucose 107  White blood count 6.5, hemoglobin 14.5, platelets 154,000  [place] cognitive assessment score 2021,24 out of 30  Previous writing samples in the chart.    CT scan head 2021  A. No CT evidence for acute intracranial process.   B. Brain atrophy and presumed chronic microvascular ischemic changes as above.   HEAD CTA: 2021  1.  Apparent loss of contrast opacification involving a diminutive in size, proximal to mid left M2 anterior temporal division middle cerebral artery with distal reconstitution. There is motion artifact in this region and this finding could be artifactual, however, a high-grade stenosis versus short segment occlusion with distal reconstitution are other considerations. Correlation for left MCA territory neurologic deficit is recommended consider MRI for further assessment.   2.  Short segment moderate to severe stenosis versus motion artifact of a mid right M2 ascending division middle cerebral artery.   3.  No proximal large vessel occlusion, aneurysm or high flow vascular malformation.   NECK  CTA: 6/24/2021  1.  No significant stenosis or dissection.    MRI scan head 6/25/2021  A.  No acute findings.   B.  Mild age-related changes.   HEAD MRA: 6/25/2021  No stenosis/occlusion, aneurysm, or high flow vascular malformation.   NECK MRA:   No measurable stenosis or dissection.  Hemoglobin A1c 6.4% (7/6/2021)  HDL 50/, (7/6/2021)  EEG 8/23/2021, 8 Hz predominant rhythm, rare 5 to 6 Hz theta disorganization of the right hemisphere no specific epileptiform discharges  MRI had 3/27/2023  1. No acute findings. Age-related changes.   2.  Mild to moderate generalized tubal atrophy  3.  Scattered nonspecific T2 flair changes  HEAD CT: 3/27/2023  No acute intracranial process.  HEAD CTA: 3/27/2023  Normal CTA [place].  NECK CTA: 3/27/2023  Normal neck CTA.          Laboratory data review                      3/2023            7/2023     3/2024      8/2024  NA/K            139/4.1                           139/4.7  BUN/Cr         21/1.28                          28.4/1.36  GLU              202                                137            149  AST                                                     20  WBC/HGB    6.9/14.6                                            ---/12.3  PLTs             144,000  HGBA1C                             6.7                             7.6  HDL/LDL                             52/82                         43/76  TSH                                                      0.94      [facility]  4/21/2021,        24 out of 30  8/30/2021,        27 out of 30  8/30/2023,        27 out of 30  8/14/2024,       25 out of 30      Exam   Review of Systems   No headache no chest pain no shortness of breath no nausea vomiting no diarrhea no fever chills    No diplopia no dysarthria no dysphagia    Has no wheezing breathing okay    Has some intermittent dizziness  No hearing loss  No tinnitus    Chronic right shoulder pain with some decreased range of motion  Gait is adequate but he does have a  "cane and 2 different walkers depending on his balance he will use 1    Does crossword puzzles to help his memory    Chronic tremor action    Otherwise review of systems negative    General exam  Blood pressure 101/53, 77  HEENT normal  Lungs clear no wheezing  Heart rate regular  Abdomen soft  Symmetrical pulses  No edema the feet      Neurologic exam  Alert oriented x3  Normal prosody speech  Normal naming  Normal repetition  Normal comprehension  No neglect  Memory okay in the past has had some very subtle difficulty with memory  8/30/2023,        27 out of 30  8/14/2024,       25 out of 30      Cranials 2 through 12  No ophthalmoplegia  No nystagmus  Face symmetrical  Decreased visual acuity left eye chronic  No visual field cut  Tongue twisters okay  Hypophonic voice/slightly raspy      Parkinsonian features  Slight decrease in blink  Soft voice  Action tremor  Slight voice tremor  Not much head  titubation  Left eyelid heavier than right chronic  Negative glabellar  Negative palmomental  Slightly stiff gait holds his shoulder is a little stiff more so on the right than the left    Upper extremities  No drift  Mild action tremor  Finger-nose okay    Mild decreased range of motion of the right shoulder I gave him some range of motion exercises    Lower extremities  Test in the seated position good strength    Gait  Gets up with his arms crossed  Ambulates back-and-forth in the room with fairly good turns  Does have an assistive device to use when his balance is not so good  Able to ambulate independently a little bit stiff on his turns      Reflexes symmetrical  No Swift reflex        Assessment/Plan       1.   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        EEG 2016 mild left theta disorganization rare        EEG 2021 " mild right theta disorganization rare        Spell is not frequent enough or stereotypical enough to warrant trial of medication discussed with patient    2.  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  CTS (carpal tunnel syndrome) (G56.02)        Patient is left-handed       Patient does play piano for most of his life       EMG September 11, 2019 shows moderate left CTS with active and chronic denervation      Complains of left shoulder pain radicular EMG 2019 negative for radicular changes        4.  Essential tremor (G25.0)        Essential tremor going on as far back as high school.       Anxiety disorder that exacerbates the tremor, which involves his head, speech, arms, right greater than left.         Continue propranolol 10 mg tablet 2 tablets twice daily        Patient feels is helping no wheezing      Diagnosis  Essential tremor  TGA/memory loss  Anxiety/panic disorder    Patient will follow-up on a yearly basis  Discussed multiple issues as above  Total care time today 30 minutes  The longitudinal plan of care for the diagnosis(es)/condition(s) as documented were addressed during this visit. Due to the added complexity in care, I will continue to support Murali in the subsequent management and with ongoing continuity of care.        As part of visit today  Reviewed ED visit 12/30/2023 right knee pain/fall  Reviewed ED note 1/30/2024 anxiety/panic attack  Reviewed primary MD note 3/7/2024  Reviewed psychiatry note 5/15/2024  Reviewed laboratory data

## 2024-08-14 ENCOUNTER — OFFICE VISIT (OUTPATIENT)
Dept: NEUROLOGY | Facility: CLINIC | Age: 84
End: 2024-08-14
Payer: COMMERCIAL

## 2024-08-14 VITALS
HEIGHT: 66 IN | BODY MASS INDEX: 25.07 KG/M2 | WEIGHT: 156 LBS | DIASTOLIC BLOOD PRESSURE: 53 MMHG | SYSTOLIC BLOOD PRESSURE: 101 MMHG

## 2024-08-14 DIAGNOSIS — G25.0 BENIGN ESSENTIAL TREMOR: Primary | ICD-10-CM

## 2024-08-14 DIAGNOSIS — G45.4 TRANSIENT GLOBAL AMNESIA: ICD-10-CM

## 2024-08-14 PROCEDURE — G2211 COMPLEX E/M VISIT ADD ON: HCPCS | Performed by: PSYCHIATRY & NEUROLOGY

## 2024-08-14 PROCEDURE — 99214 OFFICE O/P EST MOD 30 MIN: CPT | Performed by: PSYCHIATRY & NEUROLOGY

## 2024-08-14 RX ORDER — PROPRANOLOL HYDROCHLORIDE 10 MG/1
20 TABLET ORAL 2 TIMES DAILY
Qty: 360 TABLET | Refills: 3 | Status: SHIPPED | OUTPATIENT
Start: 2024-08-14

## 2024-08-14 ASSESSMENT — MONTREAL COGNITIVE ASSESSMENT (MOCA)
WHAT LEVEL OF EDUCATION WAS ATTAINED: 0
10. [FLUENCY] NAME WORDS STARTING WITH DESIGNATED LETTER: 1
4. NAME EACH OF THE THREE ANIMALS SHOWN: 3
6. READ LIST OF DIGITS [FORWARD/BACKWARD]: 2
WHAT IS THE TOTAL SCORE (OUT OF 30): 25
9. REPEAT EACH SENTENCE: 1
VISUOSPATIAL/EXECUTIVE SUBSCORE: 3
7. [VIGILENCE] TAP WHEN HEARING DESIGNATED LETTER: 1
13. ORIENTATION SUBSCORE: 6
8. SERIAL SUBTRACTION OF 7S: 1
12. MEMORY INDEX SCORE: 5
11. FOR EACH PAIR OF WORDS, WHAT CATEGORY DO THEY BELONG TO (OUT OF 2): 2

## 2024-08-14 NOTE — LETTER
"8/14/2024      Wilfrid Ortez  [address]  [place] [address]      Dear Colleague,    Thank you for referring your patient, Wilfrid Ortez, to the [facility] [place]. Please see a copy of my visit note below.    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), (2016) history abnormal MRI/EEG  Significant anxiety disorder (followed by psychiatry)  Essential tremor    Transient global amnesia event April 1, 2021  Significant anxiety disorder follows with psychiatry  Past event 2016 worked up with abnormal MRI/EEG  Essential tremor with family history of tremor      Since last seen a year ago   No surgeries  No major illnesses    ED visit 12/30/2023 right knee pain/fall  ED visit 1/30/2024 anxiety/panic attack    In the past he has had some \"lightheadedness/dizziness  No new hearing loss  No new tinnitus      He has some chronic right shoulder difficulty followed by other physicians I gave him some range of motion exercises below to prevent frozen shoulder    Patient may well play the piano somewhat hard with his tremor  He does do a lot of crossword puzzles    MoCA  8/14/2024,    25 out of 30    No further TGA spells  Does have the anxiety panic difficulties follows with psychiatry  Dressing okay  Can do buttons  Can get his COVID on  Eating okay but went to above thinking it was hard to hold the plate in the shop  Balance is okay the fall that he had he was coming out of [employer] and thought there was not another step but there was any way down    Has had some tremors going on since high school  He feels the tremors are worse when he is tired or in the evening    I do not think he has actual Parkinson's    Has some mild neurodegenerative changes      A.  TGA      " "  April 1, 2021        Since last seen no further episodes of \"amnesia\"         EEG 8/23/2021, shows a little bit of mild 5-6 Hz theta disorganization on the right hemisphere rarely         EEG 2016 showed a little bit of the left theta disorganization 5-6 Hz        This is a fairly nonspecific finding prefer not to add any antiepileptic medications unless he had stereotypical episodes with a discrete onset and offset.    MoCA  4/21/2021,        24 out of 30  8/30/2021,        27 out of 30  8/30/2023,        27 out of 30  8/14/2024,       25 out of 30      Patient thought that his memory was maybe a little bit worse but when we did actual testing he did well seems to be stable  Talked about good routines  Talked about exercise  Talked about reasonable nutrition  Talked about good sleep cycle  Also talked about music he is a  does have a keyboard and may be doing that a little bit would help stimulate the brain        B.  Essential tremor        Head titubation is also left-handed and has some left hand tremor        In regards to his essential tremor he is moved into an apartment        He is not out and about quite as much        He uses propranolol 10 mg tablet, 2 tabs p.o. twice daily         Has more trouble when he is trying to eat dinner due to the action component        Talked about some of the newer treatments that are out but I do not feel this tremor is bad enough to warrant those he agreed    C.  Vertigo         patient was in the ER on June 24, 2021 with vertigo       Patient blamed it on actually missing his morning medicines that day which include Paxil but sometimes an abrupt change in dose can cause unsteadiness/dizziness       Seem to respond to treatment with meclizine and some exercises       Work-up showed a CTA with questionable changes on the intracranial vessels but there was motion artifact       Follow-up MRA showed no significant intracranial stenosis        Laboratory data " "reviewed    D.  Kidney stones 7/5/2022 passed them without intervention              Past neurologic history review:  April 1, 2021 had difficulty with amnesia  He had woke up in the afternoon at about 12:30 PM on 4/1/2021.  He seemed to be forgetting events of the morning.  It seemed that the memory difficulty or amnesia was transient  He was alone at the time that this was occurring  He went on to have an MRI scan of the brain that was unremarkable    Patient had seen a psychiatrist on March 18, 2021  Has had 8 panic attacks over 2 months.  More than usual    Patient states that on April 1, 2021 he had gotten up in the morning had breakfast  He then picked up a friend and took his friend from the [facility] over to the [facility] to drive him off for an appointment  He felt awful he was driving over there  He dropped him off and then left him  When he came home he talk to his partner stated that he was not feeling well  He laid down  He then awoke later and seemed to have forgotten what had happened over at least an hour to couple hours    10 to 15 years ago he was up at [facility] with his brother he was out on a boat  He supposedly \"passed out\" in [facility] 7 PM in the evening  Did not have any recollection  His 81 mg of aspirin was increased to full aspirin possible TIA  Unclear if that was an amnestic spell    He is on some propranolol 10 mg, 2 tabs twice daily for his tremor   Does have some inhalers but does not get exacerbated by his propranolol    Formal [place] testing 2021 was 24 out of 30    The patient does have difficulty with some head tremor, some voice tremor, and some hand tremor.    The patient notices the tremor more when he is more stressed or anxious.    He does have a past history of panic attacks, which are variable and uses lorazepam for this.            Past medical history  TGA  Essential tremor at least since high school  Mild memory loss  Anxiety/panic " attacks  Hyperlipidemia  Diabetes  Benign prostatic hypertrophy  Hiatal hernia  Obstructive sleep apnea  Blepharospasm  Left CTS  Chronic visual loss left eye/chronic droopiness right eye    Habits  Does not smoke  Does not drink alcohol  Patient is left-handed  Bowls  right-handed and left-handed  Mouse on computer uses the right hand  Plays piano      Family history  Father with heart disease  at 57 also had rheumatic fever  Mother with cancer of the throat  at 72 also had diabetes mellitus.   Brother with epilepsy, DM  No family history of tremor    Past work-up  1. MRI scan of the head December 15, 2016 small vessel changes, small encephalomalacia in the posterior body of  the corpus callosum.  2. EEG on December 15, 2016, dysrhythmia grade II read by Dr. Taylor with some 8 Hz posterior dominant rhythm, some 5 to 6 Hz theta, a little bit of left temporal slowing.    Recent work-up  MRI scan brain 2021  A.  No acute stroke mass or bleed  B.  Mild to moderate atrophy and chronic small vessel changes  C.  Mild to moderate cerebellar atrophy   laboratory data 2021  Sodium 140 potassium 4.3  BUN 18 creatinine 1.2  Glucose 107  White blood count 6.5, hemoglobin 14.5, platelets 154,000  [place] cognitive assessment score 2021,24 out of 30  Previous writing samples in the chart.    CT scan head 2021  A. No CT evidence for acute intracranial process.   B. Brain atrophy and presumed chronic microvascular ischemic changes as above.   HEAD CTA: 2021  1.  Apparent loss of contrast opacification involving a diminutive in size, proximal to mid left M2 anterior temporal division middle cerebral artery with distal reconstitution. There is motion artifact in this region and this finding could be artifactual, however, a high-grade stenosis versus short segment occlusion with distal reconstitution are other considerations. Correlation for left MCA territory neurologic deficit is  recommended consider MRI for further assessment.   2.  Short segment moderate to severe stenosis versus motion artifact of a mid right M2 ascending division middle cerebral artery.   3.  No proximal large vessel occlusion, aneurysm or high flow vascular malformation.   NECK CTA: 6/24/2021  1.  No significant stenosis or dissection.    MRI scan head 6/25/2021  A.  No acute findings.   B.  Mild age-related changes.   HEAD MRA: 6/25/2021  No stenosis/occlusion, aneurysm, or high flow vascular malformation.   NECK MRA:   No measurable stenosis or dissection.  Hemoglobin A1c 6.4% (7/6/2021)  HDL 50/, (7/6/2021)  EEG 8/23/2021, 8 Hz predominant rhythm, rare 5 to 6 Hz theta disorganization of the right hemisphere no specific epileptiform discharges  MRI had 3/27/2023  1. No acute findings. Age-related changes.   2.  Mild to moderate generalized tubal atrophy  3.  Scattered nonspecific T2 flair changes  HEAD CT: 3/27/2023  No acute intracranial process.  HEAD CTA: 3/27/2023  Normal CTA [place].  NECK CTA: 3/27/2023  Normal neck CTA.          Laboratory data review                      3/2023            7/2023     3/2024      8/2024  NA/K            139/4.1                           139/4.7  BUN/Cr         21/1.28                          28.4/1.36  GLU              202                                137            149  AST                                                     20  WBC/HGB    6.9/14.6                                            ---/12.3  PLTs             144,000  HGBA1C                             6.7                             7.6  HDL/LDL                             52/82                         43/76  TSH                                                      0.94      MoCA  4/21/2021,        24 out of 30  8/30/2021,        27 out of 30  8/30/2023,        27 out of 30  8/14/2024,       25 out of 30      Exam   Review of Systems   No headache no chest pain no shortness of breath no nausea  vomiting no diarrhea no fever chills    No diplopia no dysarthria no dysphagia    Has no wheezing breathing okay    Has some intermittent dizziness  No hearing loss  No tinnitus    Chronic right shoulder pain with some decreased range of motion  Gait is adequate but he does have a cane and 2 different walkers depending on his balance he will use 1    Does crossword puzzles to help his memory    Chronic tremor action    Otherwise review of systems negative    General exam  Blood pressure 101/53, 77  HEENT normal  Lungs clear no wheezing  Heart rate regular  Abdomen soft  Symmetrical pulses  No edema the feet      Neurologic exam  Alert oriented x3  Normal prosody speech  Normal naming  Normal repetition  Normal comprehension  No neglect  Memory okay in the past has had some very subtle difficulty with memory  8/30/2023,        27 out of 30  8/14/2024,       25 out of 30      Cranials 2 through 12  No ophthalmoplegia  No nystagmus  Face symmetrical  Decreased visual acuity left eye chronic  No visual field cut  Tongue twisters okay  Hypophonic voice/slightly raspy      Parkinsonian features  Slight decrease in blink  Soft voice  Action tremor  Slight voice tremor  Not much head  titubation  Left eyelid heavier than right chronic  Negative glabellar  Negative palmomental  Slightly stiff gait holds his shoulder is a little stiff more so on the right than the left    Upper extremities  No drift  Mild action tremor  Finger-nose okay    Mild decreased range of motion of the right shoulder I gave him some range of motion exercises    Lower extremities  Test in the seated position good strength    Gait  Gets up with his arms crossed  Ambulates back-and-forth in the room with fairly good turns  Does have an assistive device to use when his balance is not so good  Able to ambulate independently a little bit stiff on his turns      Reflexes symmetrical  No Swift reflex        Assessment/Plan       1.   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        EEG 2016 mild left theta disorganization rare        EEG 2021 mild right theta disorganization rare        Spell is not frequent enough or stereotypical enough to warrant trial of medication discussed with patient    2.  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  CTS (carpal tunnel syndrome) (G56.02)        Patient is left-handed       Patient does play piano for most of his life       EMG September 11, 2019 shows moderate left CTS with active and chronic denervation      Complains of left shoulder pain radicular EMG 2019 negative for radicular changes        4.  Essential tremor (G25.0)        Essential tremor going on as far back as high school.       Anxiety disorder that exacerbates the tremor, which involves his head, speech, arms, right greater than left.         Continue propranolol 10 mg tablet 2 tablets twice daily        Patient feels is helping no wheezing      Diagnosis  Essential tremor  TGA/memory loss  Anxiety/panic disorder    Patient will follow-up on a yearly basis  Discussed multiple issues as above  Total care time today 30 minutes  The longitudinal plan of care for the diagnosis(es)/condition(s) as documented were addressed during this visit. Due to the added complexity in care, I will continue to support Murali in the subsequent management and with ongoing continuity of care.        As part of visit today  Reviewed ED visit 12/30/2023 right knee pain/fall  Reviewed ED note 1/30/2024 anxiety/panic attack  Reviewed primary MD note 3/7/2024  Reviewed psychiatry note 5/15/2024  Reviewed laboratory data      Again, thank you for allowing me to participate in the care of your " patient.        Sincerely,        luis Tamayo MD

## 2024-08-14 NOTE — NURSING NOTE
Chief Complaint   Patient presents with    Transient global amnesia     Annual follow up. Pt states he is doing well. No further episodes.    Tremors     He feels the tremor has gotten worse.        Ariadna Vincent LPN on 8/14/2024 at 9:31 AM

## 2024-08-14 NOTE — NURSING NOTE
NICOLE COGNITIVE ASSESSMENT (MOCA)  Version 7.1 Original Version  VISUOSPATIAL/EXECUTIVE               COPY CUBE      [   1 ]                                [  0  ] DRAW CLOCK (Ten past eleven)  (3 points)    [    1]                    [ 1   ]               [ 0   ]       Contour            Numbers     Hands POINTS                   3/ 5   NAMING    [  1 ]                                                                        [ 1   ]                                             [ 1   ]  Linoah Wheeler                                Camel                    3 / 3   MEMORY Read list of words, subject must repeat them. Do 2 trials, even if 1st trial is successful. Do a recall after 5 minutes  FACE VELVET Latter day RICHA RED No Points    1st          2nd         ATTENTION Read list of digits (1 digit/sec) Subject has to repeat in the forward order       [   1 ]   2  1  8  5  4                                [  1  ] 7 4 2                          2/2   Read list of letters. The subject must tap with his hand at each letter A. No points if > 2 errors.  [  1  ] F B A C M N A A J K L B A F A K D E A A A J A M O F A A B              1/1   Serial 7 subtraction starting at 100          [ 1   ] 93         [  0  ] 86          [  0  ] 79          [  0  ] 72         [   0 ] 65   4 or 5 correct subtractions: 3 points,  2 or 3 correct: 2 points,  1correct: 1 point,   0 correct: 0 points           1 /3   LANGUAGE Repeat: I only know that Yang is the one to help today. [   0  ]                                      The cat always hid under the couch when dogs were in the room. [ 1  ]              1 /2   Fluency: Name maximum number of words in one minute that begin with the letter F                                                                                                                    [ 1   ] 12___ (N > 11 words)              1 /1   ABSTRACTION Similarity  between e.g. banana-orange=fruit                                                                   [   1 ] train-bicycle                      [ 1   ] watch-ruler             2 /2   DELAYED  RECALL Has to recall words  WITH NO CUE FACE  [   1 ] VELVET  [  1  ] Taoist  [  1  ]  RICHA  [  1  ] RED  [ 1   ] Points for UNCUED recall only            5/5           OPTIONAL Category cue           Multiple choice cue          ORIENTATION  [  1  ] Date     [  1  ] Month       [   1 ] Year      [  1  ] Day      [ 1   ] Place        [  1  ] City          6/6   TOTAL  Normal > 26/30 Add 1 point if < 12 years education        25/30

## 2024-09-05 ENCOUNTER — HOSPITAL ENCOUNTER (OUTPATIENT)
Dept: GENERAL RADIOLOGY | Facility: HOSPITAL | Age: 84
Discharge: HOME | End: 2024-09-05
Attending: INTERNAL MEDICINE | Admitting: INTERNAL MEDICINE
Payer: COMMERCIAL

## 2024-09-05 ENCOUNTER — OFFICE VISIT (OUTPATIENT)
Dept: INTERNAL MEDICINE | Facility: CLINIC | Age: 84
End: 2024-09-05
Payer: COMMERCIAL

## 2024-09-05 VITALS
HEART RATE: 72 BPM | SYSTOLIC BLOOD PRESSURE: 106 MMHG | HEIGHT: 66 IN | BODY MASS INDEX: 25.3 KG/M2 | OXYGEN SATURATION: 96 % | TEMPERATURE: 98.6 F | DIASTOLIC BLOOD PRESSURE: 64 MMHG | WEIGHT: 157.4 LBS | RESPIRATION RATE: 16 BRPM

## 2024-09-05 DIAGNOSIS — N18.31 CHRONIC KIDNEY DISEASE, STAGE 3A (H): ICD-10-CM

## 2024-09-05 DIAGNOSIS — I10 ESSENTIAL HYPERTENSION: Primary | ICD-10-CM

## 2024-09-05 DIAGNOSIS — R05.1 ACUTE COUGH: ICD-10-CM

## 2024-09-05 DIAGNOSIS — F33.41 MAJOR DEPRESSIVE DISORDER, RECURRENT EPISODE, IN PARTIAL REMISSION (H): ICD-10-CM

## 2024-09-05 DIAGNOSIS — Z29.11 NEED FOR VACCINATION AGAINST RESPIRATORY SYNCYTIAL VIRUS: ICD-10-CM

## 2024-09-05 DIAGNOSIS — Z23 NEED FOR TDAP VACCINATION: ICD-10-CM

## 2024-09-05 PROCEDURE — 71046 X-RAY EXAM CHEST 2 VIEWS: CPT

## 2024-09-05 PROCEDURE — 99214 OFFICE O/P EST MOD 30 MIN: CPT | Performed by: INTERNAL MEDICINE

## 2024-09-05 PROCEDURE — G2211 COMPLEX E/M VISIT ADD ON: HCPCS | Performed by: INTERNAL MEDICINE

## 2024-09-05 RX ORDER — LOSARTAN POTASSIUM AND HYDROCHLOROTHIAZIDE 12.5; 5 MG/1; MG/1
1 TABLET ORAL DAILY
Qty: 90 TABLET | Refills: 11 | Status: SHIPPED | OUTPATIENT
Start: 2024-09-05

## 2024-09-05 NOTE — PROGRESS NOTES
"Assessment/Plan:    Hypertension controlled 106/64.  Associated cough with lisinopril HCTZ therapy.  DC the latter start losartan 50/12.5 with HCTZ 1 daily.  #90 and 11 refills.  Recheck blood pressure in 3 months time.    Diabetes mellitus type 2 stable with next visit fasting we will check A1c blood sugar lipid panel and urine for microalbumin.    Cough uncertain etiology check chest x-ray today likely due to lisinopril HCTZ combination for treatment of hypertension.  See above will switch to losartan HCTZ 50/12.51 daily.  To see if this helps.    Hyperlipidemia on statin therapy well-tolerated with next office visit fasting lipids will be done.    Hypothyroidism on replacement clinically euthyroid.  Continue thyroid supplement same.  TSH will be done with annual wellness visit on a annual basis.    Anxiety with panic attacks no symptoms of depression no suicidal ideations.  Continue Paxil 20 mg daily.    25 minutes spent on the date of the encounter doing chart review, review of test results, interpretation of tests, patient visit, and documentation     Subjective:  Wilfrid Ortez is a 83 year old male presents for the following health issues dry cough on lisinopril HCTZ which may be the culprit.  That is lisinopril.  Switch to losartan HCTZ see above.  Wheezing in the morning as well.  Chest x-ray also pending.    Mild anemia GI consult forthcoming September 18, 2024 looking for GI blood loss.  Discussed the importance of iron in the diet with leafy green vegetables lean red meat especially the latter.    ROS:  No blood in stool or urine med list reviewed reconciled in the chart denies melena as well.  No chest pain or shortness of breath mild cough see above.  It is a dry morning cough.    Objective:  /64   Pulse 72   Temp 98.6  F (37  C) (Oral)   Resp 16   Ht 1.676 m (5' 6\")   Wt 71.4 kg (157 lb 6.4 oz)   SpO2 96%   BMI 25.41 kg/m    Neck veins are nondistended there is no thyromegaly no " carotid bruits left or right.  Chest is clear posteriorly no rales rhonchi or wheezes heart tones regular rhythm without murmur rub or gallop oropharynx was negative tongue midline hypopharynx tonsillar areas all clear no postnasal drip.  Acid reflux could also cause cough abdomen benign extremities free of edema cyanosis or clubbing hard to get to a recumbent position abdominal exam limited.  No edema noted lower extremities or cyanosis or clubbing noted.    Mayito Carlos MD  Internal Medicine    The longitudinal plan of care for the diagnosis(es)/condition(s) as documented were addressed during this visit. Due to the added complexity in care, I will continue to support Murali in the subsequent management and with ongoing continuity of care.      Answers submitted by the patient for this visit:  Lipid Visit (Submitted on 9/5/2024)  Chief Complaint: Chronic problems general questions HPI Form  Are you regularly taking any medication or supplement to lower your cholesterol?: No  Are you having muscle aches or other side effects that you think could be caused by your cholesterol lowering medication?: Yes  General Questionnaire (Submitted on 9/5/2024)  Chief Complaint: Chronic problems general questions HPI Form  How many servings of fruits and vegetables do you eat daily?: 0-1  On average, how many sweetened beverages do you drink each day (Examples: soda, juice, sweet tea, etc.  Do NOT count diet or artificially sweetened beverages)?: 2  How many minutes a day do you exercise enough to make your heart beat faster?: 9 or less  How many days a week do you exercise enough to make your heart beat faster?: 3 or less  How many days per week do you miss taking your medication?: 0

## 2024-09-05 NOTE — PROGRESS NOTES
"  {PROVIDER CHARTING PREFERENCE:890819}    Gaby Carrion is a 83 year old, presenting for the following health issues:  Follow Up        9/5/2024     9:23 AM   Additional Questions   Roomed by Felix Reaves Paw     History of Present Illness       Hyperlipidemia:  He presents for follow up of hyperlipidemia.   He is not taking medication to lower cholesterol. He is having myalgia or other side effects to statin medications.    He eats 0-1 servings of fruits and vegetables daily.He consumes 2 sweetened beverage(s) daily.He exercises with enough effort to increase his heart rate 9 or less minutes per day.  He exercises with enough effort to increase his heart rate 3 or less days per week.   He is taking medications regularly.       {MA/LPN/RN Pre-Provider Visit Orders- hCG/UA/Strep (Optional):336319}  {SUPERLIST (Optional):466319}  {additonal problems for provider to add (Optional):591684}    {ROS Picklists (Optional):271234}      Objective    /64   Pulse 72   Temp 98.6  F (37  C) (Oral)   Resp 16   Ht 1.676 m (5' 6\")   Wt 71.4 kg (157 lb 6.4 oz)   SpO2 96%   BMI 25.41 kg/m    Body mass index is 25.41 kg/m .  Physical Exam   {Exam List (Optional):414196}    {Diagnostic Test Results (Optional):795469}        Signed Electronically by: Mayito Carlos MD  {Email feedback regarding this note to [email or web address]   :952891}  "

## 2024-09-18 ENCOUNTER — TRANSFERRED RECORDS (OUTPATIENT)
Dept: HEALTH INFORMATION MANAGEMENT | Facility: CLINIC | Age: 84
End: 2024-09-18
Payer: COMMERCIAL

## 2024-10-01 DIAGNOSIS — E08.49 OTHER DIABETIC NEUROLOGICAL COMPLICATION ASSOCIATED WITH DIABETES MELLITUS DUE TO UNDERLYING CONDITION (H): ICD-10-CM

## 2024-10-01 NOTE — TELEPHONE ENCOUNTER
Reason for Call:  Medication refill:     Do you use a [facility]? No  Name of the pharmacy and phone number for the current request:      [id number] [address]     Name of the medication requested:     Disp Refills Start End ANN    gabapentin (NEURONTIN) 300 MG capsule (Discontinued) 60 capsule 11 1/29/2024 9/5/2024 No   Sig - Route: Take 1 capsule (300 mg) by mouth 2 times daily as needed - Oral   Patient taking differently: Take 300 mg by mouth 2 times daily.       Other request: Patient would like a 90 day quantity with refills.     Medication victor manuel'd up for approval if appropriate    Can we leave a detailed message on this number? YES    Phone number patient can be reached at: Home number on file [phone] (home)    Best Time: any    Call taken on 10/1/2024 at 5:05 PM by Lora Duarte

## 2024-10-02 RX ORDER — GABAPENTIN 300 MG/1
300 CAPSULE ORAL 2 TIMES DAILY
Qty: 180 CAPSULE | Refills: 11 | Status: SHIPPED | OUTPATIENT
Start: 2024-10-02

## 2024-10-16 ENCOUNTER — TELEPHONE (OUTPATIENT)
Dept: INTERNAL MEDICINE | Facility: CLINIC | Age: 84
End: 2024-10-16
Payer: COMMERCIAL

## 2024-10-16 NOTE — TELEPHONE ENCOUNTER
Patient calling with medication questions.    Reviewed that PCP stopped lisinopril 9/5/24 due to patient's cough and started him on losartan instead. That is for his blood pressure.    Levothyroxine is for his thyroid. PCP said he has hypothyroidism.    Patient asked for lab results from appointment with [facility]. Explained that patient will need to call [facility] about that. Patient verbalized understanding.

## 2024-10-17 ENCOUNTER — TELEPHONE (OUTPATIENT)
Dept: INTERNAL MEDICINE | Facility: CLINIC | Age: 84
End: 2024-10-17
Payer: COMMERCIAL

## 2024-10-17 NOTE — TELEPHONE ENCOUNTER
Calling to follow up on UTI paperwork that needs to be signed.     Informed Addison that it is in the PCP inbox and that he is not in clinic until Monday. Addison asks for this paperwork to be completed today.

## 2024-10-17 NOTE — TELEPHONE ENCOUNTER
After speaking with covering provider, ok to wait for Dr. Carlos to review, as there is nothing in patients chart about this.     Writer LMTCB for patient to verify that he requested an in home UTI test from [facility]. If so, PCP is not back until Monday and if having urinary symptoms should follow up sooner.

## 2024-10-18 ENCOUNTER — TELEPHONE (OUTPATIENT)
Dept: INTERNAL MEDICINE | Facility: CLINIC | Age: 84
End: 2024-10-18
Payer: COMMERCIAL

## 2024-10-18 NOTE — TELEPHONE ENCOUNTER
"Pt called back regarding UTI paperwork.     Pt stated he does not know what a UTI is but after looking it up he is positive he does not have a UTI.     Pt states he does not know anyone by the name of \"Addison\".-Pt believes this could be a scam, he has been scammed recently in the past.    Will discard form.    If someone calls regarding UTI form, do not disclose any information regarding the pt.    GENE Contreras.  "

## 2024-12-21 ENCOUNTER — TELEPHONE (OUTPATIENT)
Dept: NURSING | Facility: CLINIC | Age: 84
End: 2024-12-21
Payer: COMMERCIAL

## 2024-12-22 NOTE — CONFIDENTIAL NOTE
Nurse Triage SBAR    Is this a 2nd Level Triage? NO    Situation: Call from patient who reports he has had diarrhea since last Sunday. He says he spoke to nurse on Tuesday and a message was to be sent to his PCP. Patient reports he didn't hear back.    He reports no fever and BP is normal. He went to the drug store and did purchase loperamide but it has not worked.     Background: PCP: Terrance    Assessment: Reviewed note from 12/17/24. Patient was informed of doctor's recommendation. Patient says he is concerned about dehydration and plans to go to the ED now.    Protocol Recommended Disposition:   Information provided; no triage completed.    Marcin Mcbride RN, BSN  Triage Nurse Advisor

## 2025-01-06 DIAGNOSIS — Z00.00 ROUTINE GENERAL MEDICAL EXAMINATION AT A HEALTH CARE FACILITY: ICD-10-CM

## 2025-01-07 ENCOUNTER — PATIENT OUTREACH (OUTPATIENT)
Dept: CARE COORDINATION | Facility: CLINIC | Age: 85
End: 2025-01-07
Payer: COMMERCIAL

## 2025-01-13 ENCOUNTER — OFFICE VISIT (OUTPATIENT)
Dept: INTERNAL MEDICINE | Facility: CLINIC | Age: 85
End: 2025-01-13
Payer: COMMERCIAL

## 2025-01-13 VITALS
SYSTOLIC BLOOD PRESSURE: 117 MMHG | DIASTOLIC BLOOD PRESSURE: 71 MMHG | RESPIRATION RATE: 16 BRPM | WEIGHT: 153.9 LBS | OXYGEN SATURATION: 98 % | HEART RATE: 69 BPM | TEMPERATURE: 97.8 F | BODY MASS INDEX: 24.73 KG/M2 | HEIGHT: 66 IN

## 2025-01-13 DIAGNOSIS — F33.1 MODERATE RECURRENT MAJOR DEPRESSION (H): ICD-10-CM

## 2025-01-13 DIAGNOSIS — E11.69 TYPE 2 DIABETES MELLITUS WITH OTHER SPECIFIED COMPLICATION, WITHOUT LONG-TERM CURRENT USE OF INSULIN (H): Primary | ICD-10-CM

## 2025-01-13 DIAGNOSIS — G47.33 OSA (OBSTRUCTIVE SLEEP APNEA): ICD-10-CM

## 2025-01-13 DIAGNOSIS — N18.31 CHRONIC KIDNEY DISEASE, STAGE 3A (H): ICD-10-CM

## 2025-01-13 DIAGNOSIS — R25.1 TREMOR: ICD-10-CM

## 2025-01-13 LAB
CHOLEST SERPL-MCNC: 167 MG/DL
CREAT UR-MCNC: 150 MG/DL
EST. AVERAGE GLUCOSE BLD GHB EST-MCNC: 151 MG/DL
FASTING STATUS PATIENT QL REPORTED: ABNORMAL
FASTING STATUS PATIENT QL REPORTED: NORMAL
GLUCOSE SERPL-MCNC: 175 MG/DL (ref 70–99)
HBA1C MFR BLD: 6.9 % (ref 0–5.6)
HDLC SERPL-MCNC: 51 MG/DL
LDLC SERPL CALC-MCNC: 94 MG/DL
MICROALBUMIN UR-MCNC: <12 MG/L
MICROALBUMIN/CREAT UR: NORMAL MG/G{CREAT}
NONHDLC SERPL-MCNC: 116 MG/DL
TRIGL SERPL-MCNC: 108 MG/DL

## 2025-01-13 PROCEDURE — 82043 UR ALBUMIN QUANTITATIVE: CPT | Performed by: INTERNAL MEDICINE

## 2025-01-13 PROCEDURE — 36415 COLL VENOUS BLD VENIPUNCTURE: CPT | Performed by: INTERNAL MEDICINE

## 2025-01-13 PROCEDURE — 99214 OFFICE O/P EST MOD 30 MIN: CPT | Performed by: INTERNAL MEDICINE

## 2025-01-13 PROCEDURE — 82570 ASSAY OF URINE CREATININE: CPT | Performed by: INTERNAL MEDICINE

## 2025-01-13 PROCEDURE — G2211 COMPLEX E/M VISIT ADD ON: HCPCS | Performed by: INTERNAL MEDICINE

## 2025-01-13 PROCEDURE — 82947 ASSAY GLUCOSE BLOOD QUANT: CPT | Performed by: INTERNAL MEDICINE

## 2025-01-13 PROCEDURE — 83036 HEMOGLOBIN GLYCOSYLATED A1C: CPT | Performed by: INTERNAL MEDICINE

## 2025-01-13 PROCEDURE — 80061 LIPID PANEL: CPT | Performed by: INTERNAL MEDICINE

## 2025-01-13 ASSESSMENT — PATIENT HEALTH QUESTIONNAIRE - PHQ9
SUM OF ALL RESPONSES TO PHQ QUESTIONS 1-9: 4
SUM OF ALL RESPONSES TO PHQ QUESTIONS 1-9: 4
10. IF YOU CHECKED OFF ANY PROBLEMS, HOW DIFFICULT HAVE THESE PROBLEMS MADE IT FOR YOU TO DO YOUR WORK, TAKE CARE OF THINGS AT HOME, OR GET ALONG WITH OTHER PEOPLE: NOT DIFFICULT AT ALL

## 2025-01-13 ASSESSMENT — ENCOUNTER SYMPTOMS: DIARRHEA: 1

## 2025-01-13 ASSESSMENT — PAIN SCALES - GENERAL: PAINLEVEL_OUTOF10: NO PAIN (0)

## 2025-01-13 NOTE — PROGRESS NOTES
Assessment/Plan:    (N18.31) Chronic kidney disease, stage 3a (H)  (primary encounter diagnosis)  Comment: Recent hospital stay with elevated serum creatinine level noted.  Plan: The best way to preserve kidney function is to stay well-hydrated and to keep the blood pressure normal.  Today 117/71.  Control of diabetes is also important as well as avoidance of nonsteroidal anti-inflammatory drugs.  Like Profen and naproxen sodium.  Acetaminophen or Tylenol safer for the kidney.  Close follow-up needed and recommended.    (F33.1) Moderate recurrent major depression (H)  Comment: Mood is improved.  Plan: Continue with lorazepam.  And Paxil the latter 20 mg daily lorazepam as needed caution regarding excessive use of the former as it is habit-forming.    (E11.69) Type 2 diabetes mellitus with other specified complication, without long-term current use of insulin (H)  Comment: Stable.  Plan: Recheck A1c blood sugar lipid panel urine for microalbumin today.    (G47.33) ADALID (obstructive sleep apnea)  Comment: Previously uses CPAP mask at night became agitated and hypoxic.  Plan: Repeat sleep study needed as this was done many years.    (R25.1) Tremor  Comment: Propranolol 10 mg twice a day seen today to help with the tremor.  Taking an additional propranolol 10 mg to make the tremor worse.  Plan: Continue propranolol 10 mg twice daily.  Essential tremor is the diagnosis and working diagnosis no signs of Parkinson's.  Tremor is worse with intention.        25 minutes spent on the date of the encounter doing chart review, review of test results, interpretation of tests, patient visit, and documentation     Subjective:  Wilfrid Ortez is a 84 year old male presents for the following health issues easily conversant and good spirited recent hospital stay with agitation at night and hypoxemia.  In the remote past sleep study showed ADALID.  Previously used the CPAP mask.  Needs sleep study repeated per hospital list who cared  "for him during hospital stay.  Tremor is worse with added propranolol 10 mg 3 times a day.  Better with 10 mg propranolol twice daily.?!.    ROS:  No blood in stool or urine denies chest pain shortness of breath.  Medication list reviewed reconciled in the chart and generally well-tolerated.  Except as noted above for reference to propranolol.  Higher dose seems to make the tremor worse.    Objective:  /71 (BP Location: Right arm, Patient Position: Sitting, Cuff Size: Adult Regular)   Pulse 69   Temp 97.8  F (36.6  C) (Temporal)   Resp 16   Ht 1.676 m (5' 6\")   Wt 69.8 kg (153 lb 14.4 oz)   SpO2 98%   BMI 24.84 kg/m    No tremor at rest.  Slight tremor with intention chest is clear neck veins nondistended no carotid bruits heart tones regular rhythm without murmur rub or gallop abdomen is benign extremities are free of edema cyanosis or clubbing slightly pale.  Asked about serum creatinine level as it was not normal during recent hospital stay.    Mayito Carlos MD  Internal Medicine    The longitudinal plan of care for the diagnosis(es)/condition(s) as documented were addressed during this visit. Due to the added complexity in care, I will continue to support Murali in the subsequent management and with ongoing continuity of care.      Answers submitted by the patient for this visit:  Patient Health Questionnaire (Submitted on 1/13/2025)  If you checked off any problems, how difficult have these problems made it for you to do your work, take care of things at home, or get along with other people?: Not difficult at all  PHQ9 TOTAL SCORE: 4  Diabetes Visit (Submitted on 1/13/2025)  Chief Complaint: Chronic problems general questions HPI Form  Frequency of checking blood sugars:: a few times a week  What time of day are you checking your blood sugars : before meals  Have you had any blood sugars above 200?: No  Have you had any blood sugars below 70?: No  Hypoglycemia symptoms:: shakiness, " weakness  Diabetic concerns:: none  Paraesthesia present:: burning in feet  Have you had a diabetic eye exam within the last year?: Yes  Date of last eye exam: : 07/05/24  Where was this eye exam done?: [facility]  Hypothyroid  (Submitted on 1/13/2025)  Chief Complaint: Chronic problems general questions HPI Form  Since last visit, patient describes the following symptoms:: Depression, Loose stools  General Questionnaire (Submitted on 1/13/2025)  Chief Complaint: Chronic problems general questions HPI Form  What is the reason for your visit today? : sleep apnea referal  How many servings of fruits and vegetables do you eat daily?: 0-1  On average, how many sweetened beverages do you drink each day (Examples: soda, juice, sweet tea, etc.  Do NOT count diet or artificially sweetened beverages)?: 2  How many minutes a day do you exercise enough to make your heart beat faster?: 9 or less  How many days a week do you exercise enough to make your heart beat faster?: 3 or less  How many days per week do you miss taking your medication?: 0  Questionnaire about: Chronic problems general questions HPI Form (Submitted on 1/13/2025)  Chief Complaint: Chronic problems general questions HPI Form

## 2025-01-13 NOTE — PROGRESS NOTES
{PROVIDER CHARTING PREFERENCE:044885}    Gaby Carrion is a 84 year old, presenting for the following health issues:  Diabetes, Thyroid Problem, Referral (Sleep apnea), Derm Problem (Right leg has bumps), and Diarrhea (Still having diarrhea issues- was in [facility] 12/21- 12/23.)        1/13/2025     1:53 PM   Additional Questions   Roomed by JOHNNY Grace   Accompanied by CHEY     Via the Health Maintenance questionnaire, the patient has reported the following services have been completed -Eye Exam: [facility] 2024-07-05, this information has been sent to the abstraction team.  Diarrhea    History of Present Illness       Diabetes:   He presents for follow up of diabetes.  He is checking home blood glucose a few times a week.   He checks blood glucose before meals.  Blood glucose is never over 200 and never under 70. He is aware of hypoglycemia symptoms including shakiness and weakness.    He has no concerns regarding his diabetes at this time.  He is having burning in feet.  The patient has had a diabetic eye exam in the last 12 months. Eye exam performed on 07/05/24. Location of last eye exam [facility].        Hypothyroidism:     Since last visit, patient describes the following symptoms::  Depression and Loose stools    Reason for visit:  Sleep apnea referal    He eats 0-1 servings of fruits and vegetables daily.He consumes 2 sweetened beverage(s) daily.He exercises with enough effort to increase his heart rate 9 or less minutes per day.  He exercises with enough effort to increase his heart rate 3 or less days per week.   He is taking medications regularly.       {MA/LPN/RN Pre-Provider Visit Orders- hCG/UA/Strep (Optional):866895}  {SUPERLIST (Optional):451945}  {additonal problems for provider to add (Optional):309751}    {ROS Picklists (Optional):900211}      Objective    /71 (BP Location: Right arm, Patient Position: Sitting, Cuff Size: Adult Regular)   Pulse 69   Temp  "97.8  F (36.6  C) (Temporal)   Resp 16   Ht 1.676 m (5' 6\")   Wt 69.8 kg (153 lb 14.4 oz)   SpO2 98%   BMI 24.84 kg/m    Body mass index is 24.84 kg/m .  Physical Exam   {Exam List (Optional):860059}    {Diagnostic Test Results (Optional):603270}        Signed Electronically by: Mayito Carlos MD  {Email feedback regarding this note to [email or web address]   :[id number]}  "

## 2025-01-20 ENCOUNTER — NURSE TRIAGE (OUTPATIENT)
Dept: INTERNAL MEDICINE | Facility: CLINIC | Age: 85
End: 2025-01-20
Payer: COMMERCIAL

## 2025-01-20 NOTE — TELEPHONE ENCOUNTER
"Nurse Triage SBAR    Is this a 2nd Level Triage? YES, LICENSED PRACTITIONER REVIEW IS REQUIRED    Situation: dizzy spells     Background:started a week ago, after appt in clinic. Has been taking meclizine but is not improving. Was hospitalized in the past for Vertigo. Hx of Panic attacks- states this is different. He states he did have headaches last week as well.     Assessment: when he gets up or turns his head he feels very dizzy. Has not fallen, but has spent a lot of time in bed. Uses a walker usually so unsure if issues with walking. Describes a sensation of \"air going through his ears\" Meclizine he is taking is old script and is . Last night he took a lorazepam and it did help with the dizziness and he was able to sleep.     Protocol Recommended Disposition:   See in Office Today    Recommendation: due to time of day, no openings in clinic. Pt is agreeable to be seen in .      Routed to provider    Does the patient meet one of the following criteria for ADS visit consideration? 16+ years old, with an [facility] PCP     TIP  Providers, please consider if this condition is appropriate for management at one of our Acute and Diagnostic Services sites.     If patient is a good candidate, please use dotphrase <dot>triageresponse and select Refer to ADS to document.    Reason for Disposition   MODERATE dizziness (e.g., interferes with normal activities)  (Exception: Dizziness caused by heat exposure, sudden standing, or poor fluid intake.)    Additional Information   Negative: SEVERE difficulty breathing (e.g., struggling for each breath, speaks in single words)   Negative: Shock suspected (e.g., cold/pale/clammy skin, too weak to stand, low BP, rapid pulse)   Negative: Difficult to awaken or acting confused (e.g., disoriented, slurred speech)   Negative: Fainted, and still feels dizzy afterwards   Negative: Overdose (accidental or intentional) of medications   Negative: New neurologic deficit that is present " now: * Weakness of the face, arm, or leg on one side of the body * Numbness of the face, arm, or leg on one side of the body * Loss of speech or garbled speech   Negative: Heart beating < 50 beats per minute OR > 140 beats per minute   Negative: Sounds like a life-threatening emergency to the triager   Negative: Chest pain   Negative: Rectal bleeding, bloody stool, or tarry-black stool   Negative: Vomiting is main symptom   Negative: Diarrhea is main symptom   Negative: Headache is main symptom   Negative: Heat exhaustion suspected (i.e., dehydration from heat exposure)   Negative: Patient states that they are having an anxiety or panic attack   Negative: Dizziness from low blood sugar (i.e., < 60 mg/dl or 3.5 mmol/l)   Negative: SEVERE dizziness (e.g., unable to stand, requires support to walk, feels like passing out now)   Negative: SEVERE headache or neck pain   Negative: Spinning or tilting sensation (vertigo) present now and one or more stroke risk factors (i.e., hypertension, diabetes mellitus, prior stroke/TIA, heart attack, age over 60) (Exception: Prior physician evaluation for this AND no different/worse than usual.)   Negative: Neurologic deficit that was brief (now gone), ANY of the following:* Weakness of the face, arm, or leg on one side of the body* Numbness of the face, arm, or leg on one side of the body* Loss of speech or garbled speech   Negative: Loss of vision or double vision  (Exception: Similar to previous migraines.)   Negative: Extra heartbeats, irregular heart beating, or heart is beating very fast (i.e., 'palpitations')   Negative: Difficulty breathing   Negative: Drinking very little and dehydration suspected (e.g., no urine > 12 hours, very dry mouth, very lightheaded)   Negative: Follows bleeding (e.g., stomach, rectum, vagina)  (Exception: Became dizzy from sight of small amount blood.)   Negative: Patient sounds very sick or weak to the triager   Negative: Lightheadedness (dizziness)  present now, after 2 hours of rest and fluids   Negative: Spinning or tilting sensation (vertigo) present now   Negative: Fever > 103 F (39.4 C)   Negative: Fever > 100.0 F (37.8 C) and has diabetes mellitus or a weak immune system (e.g., HIV positive, cancer chemotherapy, organ transplant, splenectomy, chronic steroids)    Protocols used: Dizziness-A-OH

## 2025-01-21 ENCOUNTER — PATIENT OUTREACH (OUTPATIENT)
Dept: CARE COORDINATION | Facility: CLINIC | Age: 85
End: 2025-01-21
Payer: COMMERCIAL

## 2025-01-24 ENCOUNTER — TELEPHONE (OUTPATIENT)
Dept: INTERNAL MEDICINE | Facility: CLINIC | Age: 85
End: 2025-01-24
Payer: COMMERCIAL

## 2025-01-24 DIAGNOSIS — H83.09 LABYRINTHITIS, UNSPECIFIED LATERALITY: ICD-10-CM

## 2025-01-24 NOTE — TELEPHONE ENCOUNTER
Per PCP below-     Mayito Carlos MD  [facility]45 minutes ago (1:27 PM)     ANGELA Santoyo for Valium 5 mg tablets dispense number 20 tablets take 1 tablet twice daily as needed for vertigo with 1 refill.  Please victor manuel up this medication I will gladly cosign and diagnosis is vertigo.  BRAIN

## 2025-01-24 NOTE — TELEPHONE ENCOUNTER
Reason for Call:  Medication or medication refill:    Do you use a [facility]?  Name of the pharmacy and phone number for the current request:      [facility][id number] [place] [address] [address] AT [facility] [address]       Name of the medication requested: meclizine (ANTIVERT) 25 MG tablet     Other request: Per patient having issues with vertigo, requested refill of medication and requests to speak with nurse.  Call transferred to triage, medication victor manuel'd unit(s) for approval if appropriate.       Call taken on 1/24/2025 at 12:29 PM by Lora Duarte

## 2025-01-24 NOTE — TELEPHONE ENCOUNTER
Vertigo symptoms since 1/14. Bed ridden just about per pt due to dizziness/spinning feeling.    Starts after patient gets up from laying down.     Pt had this happen 2-3 years ago, took valium and symptoms subsided.    Pt is requesting a prescription for valium to help with his symptoms.    Pt last in office 1/13, VV ok to discuss medication options?    Please call mobile for an update.    GENE Contreras.

## 2025-01-25 RX ORDER — MECLIZINE HYDROCHLORIDE 25 MG/1
25 TABLET ORAL 3 TIMES DAILY PRN
Qty: 30 TABLET | Refills: 0 | Status: SHIPPED | OUTPATIENT
Start: 2025-01-25

## 2025-01-25 RX ORDER — DIAZEPAM 5 MG/1
TABLET ORAL
Qty: 10 TABLET | Refills: 1 | Status: SHIPPED | OUTPATIENT
Start: 2025-01-25

## 2025-02-06 DIAGNOSIS — E03.9 HYPOTHYROIDISM, UNSPECIFIED TYPE: ICD-10-CM

## 2025-02-06 RX ORDER — LEVOTHYROXINE SODIUM 25 UG/1
25 TABLET ORAL DAILY
Qty: 90 TABLET | Refills: 11 | Status: SHIPPED | OUTPATIENT
Start: 2025-02-06

## 2025-02-06 NOTE — TELEPHONE ENCOUNTER
"Patient calling with multiple updates. Of note, did offer patient an appointment to discuss below with PCP but he declined. He asks about a refill of his thyroid medication. Will victor manuel this up for provider to sign. He also talks about his dizziness. He is still having episodes of dizziness every couple of days, but says the medication is helpful. He most recently used the meclizine yesterday and has been fine since. He does feel that it is improving although also notes that the episodes lately have been \"pretty rough\". He reiterated that the medication does help. He still doesn't know what the cause could be but he wonders if it is positional. He is trying to have the head of his bed raised and thinks this will help. His most recent BP was yesterday afternoon and was 103/60. He did have a fall recently and does not think he was injured at the time, but now does have some pain in the hip he had replaced back in 2021. He has a call out to the doctor at [facility] regarding this. He does note that he thinks he scratched open his skin on the hip and is keeping it covered with a Band-Aid. He reiterates that he talked to the nurse at [facility] about this and is waiting to hear back. He is using his walker again. He also would like to ask PCP if he can go back to taking 2 propranolol BID. When he was in the hospital in December it was decreased to 1 pill BID. He tolerated that ok for awhile but now the shaking is back. He says \"I shake bad\" to the point where he had to have someone help him hold his dinner plate. He wants to be sure PCP is ok with him going back to the 2 pills BID. Lastly, he notes he was seen by [facility] for a decrease in hemoglobin. He was seen and bloodwork was drawn but he never heard anything back about it.           "

## 2025-03-05 ENCOUNTER — NURSE TRIAGE (OUTPATIENT)
Dept: NURSING | Facility: CLINIC | Age: 85
End: 2025-03-05
Payer: COMMERCIAL
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
## 2025-03-23 ENCOUNTER — HEALTH MAINTENANCE LETTER (OUTPATIENT)
Age: 85
End: 2025-03-23

## 2025-04-07 ENCOUNTER — TRANSFERRED RECORDS (OUTPATIENT)
Dept: HEALTH INFORMATION MANAGEMENT | Facility: CLINIC | Age: 85
End: 2025-04-07
Payer: COMMERCIAL

## 2025-04-09 ENCOUNTER — OFFICE VISIT (OUTPATIENT)
Dept: SLEEP MEDICINE | Facility: CLINIC | Age: 85
End: 2025-04-09
Attending: INTERNAL MEDICINE
Payer: COMMERCIAL

## 2025-04-09 DIAGNOSIS — Z53.9 NO SHOW: Primary | ICD-10-CM

## 2025-04-17 NOTE — TELEPHONE ENCOUNTER
Refill Approved    Rx renewed per Medication Renewal Policy. Medication was last renewed on 8/16/19.    Lora Barron, [facility] Triage/Med Refill 2/20/2020     Requested Prescriptions   Pending Prescriptions Disp Refills     omeprazole (PRILOSEC) 20 MG capsule [Pharmacy Med Name: OMEPRAZOLE 20MG CAPSULES] 90 capsule 3     Sig: TAKE 1 CAPSULE(20 MG) BY MOUTH DAILY       GI Medications Refill Protocol Passed - 2/17/2020 12:26 PM        Passed - PCP or prescribing provider visit in last 12 or next 3 months.     Last office visit with prescriber/PCP: 1/20/2020 Mayito Carlos MD OR same dept: 1/20/2020 Mayito Carlos MD OR same specialty: 1/20/2020 Mayito Carlos MD  Last physical: 2/12/2019 Last [facility] visit: Visit date not found   Next visit within 3 mo: Visit date not found  Next physical within 3 mo: Visit date not found  Prescriber OR PCP: Mayito Carlos MD  Last diagnosis associated with med order: 1. Routine general medical examination at a health care facility  - omeprazole (PRILOSEC) 20 MG capsule [Pharmacy Med Name: OMEPRAZOLE 20MG CAPSULES]; TAKE 1 CAPSULE(20 MG) BY MOUTH DAILY  Dispense: 90 capsule; Refill: 3    If protocol passes may refill for 12 months if within 3 months of last provider visit (or a total of 15 months).                          alone

## 2025-04-29 ENCOUNTER — NURSE TRIAGE (OUTPATIENT)
Dept: INTERNAL MEDICINE | Facility: CLINIC | Age: 85
End: 2025-04-29
Payer: COMMERCIAL

## 2025-04-29 NOTE — TELEPHONE ENCOUNTER
Nurse Triage SBAR    Is this a 2nd Level Triage? NO    Situation: Pt calling concerned with sharp pain he feels next to his left nipple.     Background: Pt states he first noticed the pain last night. Pain comes and goes. Pt reports having diarrhea on Saturday and took 2 imodium pills and has not had a BM since. He also reports eating cereal and a half a loaf of cinnamon raisin bread last night. Pt denies SOB, pain in arms, sweating, difficulty breathing, or fever. Pt took blood pressures while on the phone with RN 5 minutes apart:  137/74 Pulse 79  128/77 Pulse 69    Assessment:   1. LOCATION: Sharp pain next to left nipple  2. RADIATION: Denies  3. ONSET:  Last night  4. PATTERN: Every couple of minutes  5. DURATION: No more than 2-3 seconds  6. SEVERITY: Severe 9/10  7. CARDIAC RISK FACTORS: On BP medication for about a year  losartan-hydrochlorothiazide (HYZAAR) 50-12.5 MG tablet   propranolol (INDERAL) 10 MG tablet   8. PULMONARY RISK FACTORS: Denies   9. CAUSE: Unsure, has hx of peptic ulcer  10. OTHER SYMPTOMS: Denies  11. PREGNANCY: No     Protocol Recommended Disposition:   Home Care    Recommendation: Care advice given. Recommend that patient call back if symptoms worsen.     Does the patient meet one of the following criteria for ADS visit consideration? No     Reason for Disposition   Chest pain(s) lasting a few seconds    Additional Information   Negative: Chest pain(s) lasting a few seconds from coughing   Negative: SEVERE difficulty breathing (e.g., struggling for each breath, speaks in single words)   Negative: Difficult to awaken or acting confused (e.g., disoriented, slurred speech)   Negative: Shock suspected (e.g., cold/pale/clammy skin, too weak to stand, low BP, rapid pulse)   Negative: Passed out (i.e., lost consciousness, collapsed and was not responding)   Negative: Chest pain lasting longer than 5 minutes and ANY of the following:         Pain is crushing, pressure-like, or heavy          Over 44 years old          Over 30 years old and one cardiac risk factor (e.g diabetes, high blood pressure, high cholesterol, smoker, or family history of heart disease)         History of heart disease (e.g. angina, heart attack, heart failure, bypass surgery, takes nitroglycerin)   Negative: Heart beating < 50 beats per minute OR > 140 beats per minute   Negative: Visible sweat on face or sweat dripping down face   Negative: Sounds like a life-threatening emergency to the triager   Negative: Followed an injury to chest   Negative: SEVERE chest pain   Negative: Pain also in shoulder(s) or arm(s) or jaw   Negative: Difficulty breathing   Negative: Cocaine use within last 3 days   Negative: Major surgery in the past month   Negative: Hip or leg fracture (broken bone) in past month (or had cast on leg or ankle in past month)   Negative: Illness requiring prolonged bedrest in past month (e.g., immobilization, long hospital stay)   Negative: Long-distance travel in past month (e.g., car, bus, train, plane; with trip lasting 6 or more hours)   Negative: History of prior 'blood clot' in leg or lungs (i.e., deep vein thrombosis, pulmonary embolism)   Negative: History of inherited increased risk of blood clots (e.g., Factor 5 Leiden, Anti-thrombin 3, Protein C or Protein S deficiency, Prothrombin mutation)   Negative: Cancer treatment in the past two months (or has cancer now)   Negative: Heart beating irregularly or very rapidly   Negative: Chest pain lasting longer than 5 minutes and occurred in last 3 days (72 hours) (Exception: Feels exactly the same as previously diagnosed heartburn and has accompanying sour taste in mouth.)   Negative: Chest pain or 'angina' comes and goes and is happening more often (increasing in frequency) or getting worse (increasing in severity) (Exception: Chest pains that last only a few seconds.)   Negative: Dizziness or lightheadedness   Negative: Coughing up blood   Negative: Patient  sounds very sick or weak to the triager   Negative: Patient says chest pain feels exactly the same as previously diagnosed 'heartburn' and describes burning in chest and accompanying sour taste in mouth   Negative: Fever > 100.4 F (38.0 C)   Negative: Chest pain(s) lasting a few seconds persists > 3 days   Negative: Rash in same area as pain (may be described as 'small blisters')   Negative: All other patients with chest pain (Exception: Fleeting chest pain lasting a few seconds.)   Negative: Patient wants to be seen    Protocols used: Chest Pain-A-[place]

## 2025-05-05 DIAGNOSIS — I10 ESSENTIAL HYPERTENSION: ICD-10-CM

## 2025-05-05 RX ORDER — SIMVASTATIN 20 MG
20 TABLET ORAL AT BEDTIME
Qty: 90 TABLET | Refills: 1 | Status: SHIPPED | OUTPATIENT
Start: 2025-05-05

## 2025-05-13 ENCOUNTER — VIRTUAL VISIT (OUTPATIENT)
Dept: INTERNAL MEDICINE | Facility: CLINIC | Age: 85
End: 2025-05-13
Payer: COMMERCIAL

## 2025-05-13 DIAGNOSIS — I10 ESSENTIAL HYPERTENSION: Primary | ICD-10-CM

## 2025-05-13 DIAGNOSIS — R30.0 DYSURIA: ICD-10-CM

## 2025-05-13 PROCEDURE — 98006 SYNCH AUDIO-VIDEO EST MOD 30: CPT | Performed by: INTERNAL MEDICINE

## 2025-05-13 RX ORDER — LOSARTAN POTASSIUM 50 MG/1
50 TABLET ORAL DAILY
Qty: 30 TABLET | Refills: 11 | Status: SHIPPED | OUTPATIENT
Start: 2025-05-13

## 2025-05-13 NOTE — PROGRESS NOTES
"Murali is a 84 year old who is being evaluated via a billable video visit.    How would you like to obtain your AVS? MyChart  If the video visit is dropped, the invitation should be resent by: Text to cell phone: [phone]  Will anyone else be joining your video visit? No  {If patient encounters technical issues they should call [phone] :176789}    {PROVIDER CHARTING PREFERENCE:842631}    Subjective   Murali is a 84 year old, presenting for the following health issues:  Follow Up (4 month follow up. Would like to discuss catheter ), Recheck Medication (losartan-hydrochlorothiazide - frequent urination - go every 10 minutes ), and Urinary Problem (Burning when urinate  )        5/13/2025     9:19 AM   Additional Questions   Roomed by Benoit Farrell   Accompanied by N/A     Video Start Time: {video visit start/end time for provider to select:350323}    History of Present Illness       Reason for visit:  4 month follow up   He is taking medications regularly.        {SUPERLIST (Optional):843187}  {additonal problems for provider to add (Optional):116085}    {ROS Picklists (Optional):544916}      Objective           Vitals:  No vitals were obtained today due to virtual visit.    Physical Exam   {video visit exam brief selected:972033}    {Diagnostic Test Results (Optional):981769}      Video-Visit Details    Type of service:  Video Visit   Video End Time:{video visit start/end time for provider to select:543575}  Originating Location (pt. Location): {video visit patient location:923793::\"Home\"}  {PROVIDER LOCATION On-site should be selected for visits conducted from your clinic location or adjoining [facility] hospital, academic office, or other nearby [facility] building. Off-site should be selected for all other provider locations, including home:419186}  Distant Location (provider location):  {virtual location provider:761432}  Platform used for Video Visit: {Virtual Visit Platforms:817194::\"AmWell\"}  Signed Electronically by: " Mayito Carlos MD  {Email feedback regarding this note to [email or web address]   :566965}
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
## 2025-07-27 ENCOUNTER — HEALTH MAINTENANCE LETTER (OUTPATIENT)
Age: 85
End: 2025-07-27

## 2025-08-05 ENCOUNTER — PATIENT OUTREACH (OUTPATIENT)
Dept: CARE COORDINATION | Facility: CLINIC | Age: 85
End: 2025-08-05
Payer: COMMERCIAL

## 2025-08-10 ENCOUNTER — NURSE TRIAGE (OUTPATIENT)
Dept: NURSING | Facility: CLINIC | Age: 85
End: 2025-08-10
Payer: COMMERCIAL